# Patient Record
Sex: FEMALE | Race: WHITE | NOT HISPANIC OR LATINO | Employment: FULL TIME | URBAN - METROPOLITAN AREA
[De-identification: names, ages, dates, MRNs, and addresses within clinical notes are randomized per-mention and may not be internally consistent; named-entity substitution may affect disease eponyms.]

---

## 2017-02-28 DIAGNOSIS — I10 ESSENTIAL HYPERTENSION: Primary | ICD-10-CM

## 2017-02-28 DIAGNOSIS — E78.5 HYPERLIPIDEMIA, UNSPECIFIED HYPERLIPIDEMIA TYPE: ICD-10-CM

## 2017-03-19 LAB
ALBUMIN SERPL-MCNC: 4.8 G/DL (ref 3.5–5.5)
ALBUMIN/GLOB SERPL: 1.8 {RATIO} (ref 1.2–2.2)
ALP SERPL-CCNC: 60 IU/L (ref 39–117)
ALT SERPL-CCNC: 16 IU/L (ref 0–32)
AST SERPL-CCNC: 20 IU/L (ref 0–40)
BASOPHILS # BLD AUTO: 0.1 X10E3/UL (ref 0–0.2)
BASOPHILS NFR BLD AUTO: 2 %
BILIRUB SERPL-MCNC: 0.5 MG/DL (ref 0–1.2)
BUN SERPL-MCNC: 26 MG/DL (ref 6–24)
BUN/CREAT SERPL: 28 (ref 9–23)
CALCIUM SERPL-MCNC: 10.6 MG/DL (ref 8.7–10.2)
CHLORIDE SERPL-SCNC: 104 MMOL/L (ref 96–106)
CHOLEST SERPL-MCNC: 204 MG/DL (ref 100–199)
CO2 SERPL-SCNC: 24 MMOL/L (ref 18–29)
CREAT SERPL-MCNC: 0.93 MG/DL (ref 0.57–1)
EOSINOPHIL # BLD AUTO: 0.2 X10E3/UL (ref 0–0.4)
EOSINOPHIL NFR BLD AUTO: 5 %
ERYTHROCYTE [DISTWIDTH] IN BLOOD BY AUTOMATED COUNT: 12.9 % (ref 12.3–15.4)
GLOBULIN SER CALC-MCNC: 2.6 G/DL (ref 1.5–4.5)
GLUCOSE SERPL-MCNC: 94 MG/DL (ref 65–99)
HCT VFR BLD AUTO: 41 % (ref 34–46.6)
HDLC SERPL-MCNC: 58 MG/DL
HGB BLD-MCNC: 13.8 G/DL (ref 11.1–15.9)
IMM GRANULOCYTES # BLD: 0 X10E3/UL (ref 0–0.1)
IMM GRANULOCYTES NFR BLD: 0 %
LDLC SERPL CALC-MCNC: 105 MG/DL (ref 0–99)
LYMPHOCYTES # BLD AUTO: 1.6 X10E3/UL (ref 0.7–3.1)
LYMPHOCYTES NFR BLD AUTO: 32 %
MCH RBC QN AUTO: 32 PG (ref 26.6–33)
MCHC RBC AUTO-ENTMCNC: 33.7 G/DL (ref 31.5–35.7)
MCV RBC AUTO: 95 FL (ref 79–97)
MONOCYTES # BLD AUTO: 0.4 X10E3/UL (ref 0.1–0.9)
MONOCYTES NFR BLD AUTO: 9 %
NEUTROPHILS # BLD AUTO: 2.6 X10E3/UL (ref 1.4–7)
NEUTROPHILS NFR BLD AUTO: 52 %
PLATELET # BLD AUTO: 198 X10E3/UL (ref 150–379)
POTASSIUM SERPL-SCNC: 4.4 MMOL/L (ref 3.5–5.2)
PROT SERPL-MCNC: 7.4 G/DL (ref 6–8.5)
RBC # BLD AUTO: 4.31 X10E6/UL (ref 3.77–5.28)
SODIUM SERPL-SCNC: 143 MMOL/L (ref 134–144)
TRIGL SERPL-MCNC: 204 MG/DL (ref 0–149)
TSH SERPL DL<=0.005 MIU/L-ACNC: 1.47 UIU/ML (ref 0.45–4.5)
VLDLC SERPL CALC-MCNC: 41 MG/DL (ref 5–40)
WBC # BLD AUTO: 4.9 X10E3/UL (ref 3.4–10.8)

## 2017-03-27 ENCOUNTER — OFFICE VISIT (OUTPATIENT)
Dept: FAMILY MEDICINE CLINIC | Facility: CLINIC | Age: 62
End: 2017-03-27

## 2017-03-27 VITALS
RESPIRATION RATE: 16 BRPM | SYSTOLIC BLOOD PRESSURE: 140 MMHG | DIASTOLIC BLOOD PRESSURE: 80 MMHG | OXYGEN SATURATION: 98 % | HEART RATE: 59 BPM | TEMPERATURE: 98.4 F | HEIGHT: 64 IN | BODY MASS INDEX: 25.95 KG/M2 | WEIGHT: 152 LBS

## 2017-03-27 DIAGNOSIS — I10 ESSENTIAL HYPERTENSION: Primary | ICD-10-CM

## 2017-03-27 DIAGNOSIS — E78.2 MIXED HYPERLIPIDEMIA: ICD-10-CM

## 2017-03-27 PROCEDURE — 99214 OFFICE O/P EST MOD 30 MIN: CPT | Performed by: FAMILY MEDICINE

## 2017-03-27 RX ORDER — GRAPE SEED EXT/BIOFLAV,CITRUS 50MG-250MG
CAPSULE ORAL
COMMUNITY
End: 2019-01-17

## 2017-03-27 RX ORDER — LISINOPRIL AND HYDROCHLOROTHIAZIDE 12.5; 1 MG/1; MG/1
1 TABLET ORAL DAILY
Qty: 90 TABLET | Refills: 1 | Status: SHIPPED | OUTPATIENT
Start: 2017-03-27 | End: 2017-10-23 | Stop reason: SDUPTHER

## 2017-03-27 RX ORDER — FENOFIBRATE 160 MG/1
160 TABLET ORAL DAILY
Qty: 90 TABLET | Refills: 1 | Status: SHIPPED | OUTPATIENT
Start: 2017-03-27 | End: 2017-09-18 | Stop reason: SDUPTHER

## 2017-03-27 RX ORDER — BUDESONIDE AND FORMOTEROL FUMARATE DIHYDRATE 160; 4.5 UG/1; UG/1
AEROSOL RESPIRATORY (INHALATION)
Refills: 4 | COMMUNITY
Start: 2017-03-09 | End: 2017-07-24

## 2017-03-27 RX ORDER — AMLODIPINE BESYLATE 2.5 MG/1
2.5 TABLET ORAL DAILY
Qty: 90 TABLET | Refills: 1 | Status: SHIPPED | OUTPATIENT
Start: 2017-03-27 | End: 2017-10-23 | Stop reason: SDUPTHER

## 2017-03-27 RX ORDER — AZELASTINE 1 MG/ML
2 SPRAY, METERED NASAL 2 TIMES DAILY
COMMUNITY

## 2017-03-27 NOTE — PATIENT INSTRUCTIONS
Exercise 30 minutes most days of the week  Sleep 6-8 hours each night if possible  Low fat, low cholesterol diet   we discussed prescribed medications and how to take them   make sure you get results of any labs/studies ordered today  Low glycemic index diet  Exercise 3 times per week

## 2017-03-27 NOTE — PROGRESS NOTES
"Subjective   Sera Salas is a 61 y.o. female.     History of Present Illness   Chief Complaint:   Chief Complaint   Patient presents with   • Hypertension   • Hyperlipidemia       Sera Salas 61 y.o. female who presents today for Medical Management of the below listed issues and medication refills. I reviewed her lab results. Only took norvasc 1 month. Will add that back in medication.  she has a history of   Patient Active Problem List   Diagnosis   • Essential hypertension   • Hyperlipidemia   .  Since the last visit, she has overall felt well.  she has been compliant with   Current Outpatient Prescriptions:   •  Albuterol Sulfate (VENTOLIN HFA IN), Inhale., Disp: , Rfl:   •  amLODIPine (NORVASC) 2.5 MG tablet, TAKE 1 TABLET BY MOUTH DAILY, Disp: 90 tablet, Rfl: 1  •  aspirin 81 MG EC tablet, Take 81 mg by mouth daily., Disp: , Rfl:   •  Budesonide-Formoterol Fumarate (SYMBICORT IN), Inhale., Disp: , Rfl:   •  cetirizine (ZyrTEC) 10 MG tablet, Take 10 mg by mouth daily., Disp: , Rfl:   •  fenofibrate 160 MG tablet, Take 1 tablet by mouth daily., Disp: 90 tablet, Rfl: 1  •  fluticasone (FLONASE) 50 MCG/ACT nasal spray, 2 sprays into each nostril daily. Administer 2 sprays in each nostril for each dose., Disp: , Rfl:   •  lisinopril-hydrochlorothiazide (PRINZIDE,ZESTORETIC) 10-12.5 MG per tablet, Take 1 tablet by mouth daily., Disp: 90 tablet, Rfl: 1  •  LOPREEZA 1-0.5 MG per tablet, TK 1 T PO D, Disp: , Rfl: 3  •  montelukast (SINGULAIR) 10 MG tablet, Take 10 mg by mouth every night., Disp: , Rfl:   •  SYMBICORT 160-4.5 MCG/ACT inhaler, INHALE 2 PUFFS PO Q 12 HOURS. USE WITH SPACER AND RINSE MOUTH AFTER EACH USE, Disp: , Rfl: 4.  she denies medication side effects.    All of the chronic condition(s) listed above are stable w/o issues.    /79  Pulse 59  Temp 98.4 °F (36.9 °C) (Oral)   Resp 16  Ht 64\" (162.6 cm)  Wt 152 lb (68.9 kg)  SpO2 98%  BMI 26.09 kg/m2    Results for orders placed or " performed in visit on 02/28/17   Comprehensive metabolic panel   Result Value Ref Range    Glucose 94 65 - 99 mg/dL    BUN 26 (H) 6 - 24 mg/dL    Creatinine 0.93 0.57 - 1.00 mg/dL    eGFR Non African Am 67 >59 mL/min/1.73    eGFR African Am 78 >59 mL/min/1.73    BUN/Creatinine Ratio 28 (H) 9 - 23    Sodium 143 134 - 144 mmol/L    Potassium 4.4 3.5 - 5.2 mmol/L    Chloride 104 96 - 106 mmol/L    Total CO2 24 18 - 29 mmol/L    Calcium 10.6 (H) 8.7 - 10.2 mg/dL    Total Protein 7.4 6.0 - 8.5 g/dL    Albumin 4.8 3.5 - 5.5 g/dL    Globulin 2.6 1.5 - 4.5 g/dL    A/G Ratio 1.8 1.2 - 2.2    Total Bilirubin 0.5 0.0 - 1.2 mg/dL    Alkaline Phosphatase 60 39 - 117 IU/L    AST (SGOT) 20 0 - 40 IU/L    ALT (SGPT) 16 0 - 32 IU/L   Lipid panel   Result Value Ref Range    Total Cholesterol 204 (H) 100 - 199 mg/dL    Triglycerides 204 (H) 0 - 149 mg/dL    HDL Cholesterol 58 >39 mg/dL    VLDL Cholesterol 41 (H) 5 - 40 mg/dL    LDL Cholesterol  105 (H) 0 - 99 mg/dL   TSH   Result Value Ref Range    TSH 1.470 0.450 - 4.500 uIU/mL   CBC and Differential   Result Value Ref Range    WBC 4.9 3.4 - 10.8 x10E3/uL    RBC 4.31 3.77 - 5.28 x10E6/uL    Hemoglobin 13.8 11.1 - 15.9 g/dL    Hematocrit 41.0 34.0 - 46.6 %    MCV 95 79 - 97 fL    MCH 32.0 26.6 - 33.0 pg    MCHC 33.7 31.5 - 35.7 g/dL    RDW 12.9 12.3 - 15.4 %    Platelets 198 150 - 379 x10E3/uL    Neutrophil Rel % 52 %    Lymphocyte Rel % 32 %    Monocyte Rel % 9 %    Eosinophil Rel % 5 %    Basophil Rel % 2 %    Neutrophils Absolute 2.6 1.4 - 7.0 x10E3/uL    Lymphocytes Absolute 1.6 0.7 - 3.1 x10E3/uL    Monocytes Absolute 0.4 0.1 - 0.9 x10E3/uL    Eosinophils Absolute 0.2 0.0 - 0.4 x10E3/uL    Basophils Absolute 0.1 0.0 - 0.2 x10E3/uL    Immature Granulocyte Rel % 0 %    Immature Grans Absolute 0.0 0.0 - 0.1 x10E3/uL         The following portions of the patient's history were reviewed and updated as appropriate: allergies, current medications, past family history, past medical  history, past social history, past surgical history and problem list.    Review of Systems   Constitutional: Negative for activity change, appetite change and unexpected weight change.   Eyes: Negative for visual disturbance.   Respiratory: Negative for chest tightness and shortness of breath.    Cardiovascular: Negative for chest pain and palpitations.   Skin: Negative for color change.   Neurological: Negative for syncope and speech difficulty.   Psychiatric/Behavioral: Negative for confusion and decreased concentration.       Objective   Physical Exam   Constitutional: She is oriented to person, place, and time. She appears well-developed and well-nourished.   HENT:   Head: Normocephalic and atraumatic.   Eyes: EOM are normal. Pupils are equal, round, and reactive to light.   Neck: Normal range of motion. Neck supple. No thyromegaly present.   Cardiovascular: Normal rate and regular rhythm.    Pulmonary/Chest: Effort normal and breath sounds normal.   Abdominal: Soft. Bowel sounds are normal.   Musculoskeletal: Normal range of motion.   Neurological: She is alert and oriented to person, place, and time.   Skin: Skin is warm and dry.   Psychiatric: She has a normal mood and affect. Her behavior is normal.   Nursing note and vitals reviewed.      Assessment/Plan   Sera was seen today for hypertension and hyperlipidemia.    Diagnoses and all orders for this visit:    Essential hypertension  -     amLODIPine (NORVASC) 2.5 MG tablet; Take 1 tablet by mouth Daily.  -     lisinopril-hydrochlorothiazide (PRINZIDE,ZESTORETIC) 10-12.5 MG per tablet; Take 1 tablet by mouth Daily.  -     Comprehensive Metabolic Panel; Future  -     Lipid Panel; Future    Mixed hyperlipidemia  -     fenofibrate 160 MG tablet; Take 1 tablet by mouth Daily.  -     Comprehensive Metabolic Panel; Future  -     Lipid Panel; Future

## 2017-07-24 ENCOUNTER — OFFICE VISIT (OUTPATIENT)
Dept: FAMILY MEDICINE CLINIC | Facility: CLINIC | Age: 62
End: 2017-07-24

## 2017-07-24 VITALS
OXYGEN SATURATION: 100 % | RESPIRATION RATE: 16 BRPM | HEART RATE: 67 BPM | DIASTOLIC BLOOD PRESSURE: 65 MMHG | BODY MASS INDEX: 25.61 KG/M2 | HEIGHT: 64 IN | WEIGHT: 150 LBS | SYSTOLIC BLOOD PRESSURE: 117 MMHG | TEMPERATURE: 97.5 F

## 2017-07-24 DIAGNOSIS — N39.0 RECURRENT UTI (URINARY TRACT INFECTION): Primary | ICD-10-CM

## 2017-07-24 LAB
BILIRUB BLD-MCNC: NEGATIVE MG/DL
CLARITY, POC: CLEAR
COLOR UR: YELLOW
GLUCOSE UR STRIP-MCNC: NEGATIVE MG/DL
KETONES UR QL: NEGATIVE
LEUKOCYTE EST, POC: NEGATIVE
NITRITE UR-MCNC: NEGATIVE MG/ML
PH UR: 6 [PH] (ref 5–8)
PROT UR STRIP-MCNC: NEGATIVE MG/DL
RBC # UR STRIP: ABNORMAL /UL
SP GR UR: 1.02 (ref 1–1.03)
UROBILINOGEN UR QL: NORMAL

## 2017-07-24 PROCEDURE — 81003 URINALYSIS AUTO W/O SCOPE: CPT | Performed by: FAMILY MEDICINE

## 2017-07-24 PROCEDURE — 99213 OFFICE O/P EST LOW 20 MIN: CPT | Performed by: FAMILY MEDICINE

## 2017-07-24 RX ORDER — ALBUTEROL SULFATE 90 UG/1
2 AEROSOL, METERED RESPIRATORY (INHALATION) EVERY 4 HOURS PRN
COMMUNITY

## 2017-07-24 NOTE — PROGRESS NOTES
Subjective   Sera Salas is a 61 y.o. female.     History of Present Illness   Chief Complaint:   Chief Complaint   Patient presents with   • Urinary Tract Infection     Recurring UTI       Sera Salas 61 y.o. female who presents today for recurrent UTI. She stated that she has had 3 UTI's in the last 3 months. She has no pain currently but continues to have urinary frequency. Her UA dip in the office showed a trace of blood. I will send this out for a Micro and C&S. I am referring her to Dr. Richards at [facility] for further evaluation and treatment.  she has a history of   Patient Active Problem List   Diagnosis   • Essential hypertension   • Hyperlipidemia   .  Since the last visit, she has overall felt well.  she has been compliant with   Current Outpatient Prescriptions:   •  albuterol (PROVENTIL HFA;VENTOLIN HFA) 108 (90 BASE) MCG/ACT inhaler, Inhale 2 puffs Every 4 (Four) Hours As Needed for Wheezing., Disp: , Rfl:   •  amLODIPine (NORVASC) 2.5 MG tablet, Take 1 tablet by mouth Daily., Disp: 90 tablet, Rfl: 1  •  aspirin 81 MG EC tablet, Take 81 mg by mouth daily., Disp: , Rfl:   •  azelastine (ASTELIN) 0.1 % nasal spray, 2 sprays into each nostril 2 (Two) Times a Day. Use in each nostril as directed, Disp: , Rfl:   •  Black Cohosh 540 MG capsule, Take  by mouth., Disp: , Rfl:   •  cetirizine (ZyrTEC) 10 MG tablet, Take 10 mg by mouth daily., Disp: , Rfl:   •  Cholecalciferol (VITAMIN D3) 1000 UNITS capsule, Take  by mouth., Disp: , Rfl:   •  CRANBERRY FRUIT PO, Take  by mouth., Disp: , Rfl:   •  fenofibrate 160 MG tablet, Take 1 tablet by mouth Daily., Disp: 90 tablet, Rfl: 1  •  fluticasone (FLONASE) 50 MCG/ACT nasal spray, 2 sprays into each nostril daily. Administer 2 sprays in each nostril for each dose., Disp: , Rfl:   •  Krill Oil 300 MG capsule, Take  by mouth., Disp: , Rfl:   •  lisinopril-hydrochlorothiazide (PRINZIDE,ZESTORETIC) 10-12.5 MG per tablet, Take 1 tablet by mouth Daily., Disp: 90  "tablet, Rfl: 1  •  LOPREEZA 1-0.5 MG per tablet, TK 1 T PO D, Disp: , Rfl: 3  •  montelukast (SINGULAIR) 10 MG tablet, Take 10 mg by mouth every night., Disp: , Rfl:   •  Multiple Vitamins-Minerals (MULTI COMPLETE PO), Take  by mouth., Disp: , Rfl:   •  fluticasone-salmeterol (ADVAIR HFA) 115-21 MCG/ACT inhaler, Inhale 2 puffs 2 (Two) Times a Day., Disp: , Rfl: .  she denies medication side effects.    All of the chronic condition(s) listed above are stable w/o issues.    /65  Pulse 67  Temp 97.5 °F (36.4 °C) (Oral)   Resp 16  Ht 64\" (162.6 cm)  Wt 150 lb (68 kg)  SpO2 100%  BMI 25.75 kg/m2    The following portions of the patient's history were reviewed and updated as appropriate: allergies, current medications, past family history, past medical history, past social history, past surgical history and problem list.    Review of Systems   Constitutional: Negative for activity change, appetite change and unexpected weight change.   Eyes: Negative for visual disturbance.   Respiratory: Negative for chest tightness and shortness of breath.    Cardiovascular: Negative for chest pain and palpitations.   Genitourinary: Positive for frequency.   Skin: Negative for color change.   Neurological: Negative for syncope and speech difficulty.   Psychiatric/Behavioral: Negative for confusion and decreased concentration.       Objective   Physical Exam   Constitutional: She is oriented to person, place, and time. She appears well-developed and well-nourished.   Eyes: EOM are normal. Pupils are equal, round, and reactive to light.   Neck: Normal range of motion. Neck supple.   Cardiovascular: Normal rate and regular rhythm.    Pulmonary/Chest: Effort normal.   Abdominal: Soft.   Neurological: She is alert and oriented to person, place, and time.   Skin: Skin is warm and dry.       Assessment/Plan   Sera was seen today for urinary tract infection.    Diagnoses and all orders for this visit:    Recurrent UTI (urinary " tract infection)  -     POC Urinalysis Dipstick, Automated  -     Ambulatory Referral to Urology  -     Urine Culture  -     Urinalysis With Microscopic    Other orders  -     Cancel: Ambulatory Referral to Urology

## 2017-07-24 NOTE — PATIENT INSTRUCTIONS
Exercise 30 minutes most days of the week  Sleep 6-8 hours each night if possible  Low fat, low cholesterol diet   we discussed prescribed medications and how to take them   make sure you get results of any labs/studies ordered today  Low glycemic index diet     Refer to dr mccullough

## 2017-07-28 LAB
APPEARANCE UR: CLEAR
BACTERIA #/AREA URNS HPF: ABNORMAL /[HPF]
BACTERIA UR CULT: ABNORMAL
BACTERIA UR CULT: ABNORMAL
BILIRUB UR QL STRIP: NEGATIVE
COLOR UR: YELLOW
CRYSTALS URNS MICRO: ABNORMAL
EPI CELLS #/AREA URNS HPF: ABNORMAL /HPF
GLUCOSE UR QL: NEGATIVE
HGB UR QL STRIP: NEGATIVE
KETONES UR QL STRIP: NEGATIVE
LEUKOCYTE ESTERASE UR QL STRIP: NEGATIVE
Lab: NORMAL
MICRO URNS: (no result)
MICRO URNS: (no result)
MUCOUS THREADS URNS QL MICRO: PRESENT
NITRITE UR QL STRIP: NEGATIVE
OTHER ANTIBIOTIC SUSC ISLT: ABNORMAL
PH UR STRIP: 6.5 [PH] (ref 5–7.5)
PROT UR QL STRIP: NEGATIVE
RBC #/AREA URNS HPF: ABNORMAL /HPF
SP GR UR: 1.02 (ref 1–1.03)
UNIDENT CRYS URNS QL MICRO: PRESENT
UROBILINOGEN UR STRIP-MCNC: 0.2 MG/DL (ref 0.2–1)
WBC #/AREA URNS HPF: ABNORMAL /HPF

## 2017-08-17 ENCOUNTER — TELEPHONE (OUTPATIENT)
Dept: FAMILY MEDICINE CLINIC | Facility: CLINIC | Age: 62
End: 2017-08-17

## 2017-08-17 DIAGNOSIS — N39.0 URINARY TRACT INFECTION, SITE UNSPECIFIED: Primary | ICD-10-CM

## 2017-08-17 RX ORDER — CIPROFLOXACIN 500 MG/1
500 TABLET, FILM COATED ORAL 2 TIMES DAILY
Qty: 20 TABLET | Refills: 0 | Status: SHIPPED | OUTPATIENT
Start: 2017-08-17 | End: 2017-10-23

## 2017-08-17 NOTE — TELEPHONE ENCOUNTER
SEND CIPRO 500MG BID 10 DAYS TO [facility],    THEN SEND ORDER TO [facility]  FOR U/A WITH MICRO  AND C&S AFTER SHE FINISH CIPRO     I sent rx and lab order

## 2017-08-27 ENCOUNTER — RESULTS ENCOUNTER (OUTPATIENT)
Dept: FAMILY MEDICINE CLINIC | Facility: CLINIC | Age: 62
End: 2017-08-27

## 2017-08-27 DIAGNOSIS — E78.2 MIXED HYPERLIPIDEMIA: ICD-10-CM

## 2017-08-27 DIAGNOSIS — I10 ESSENTIAL HYPERTENSION: ICD-10-CM

## 2017-09-04 LAB
APPEARANCE UR: CLEAR
BACTERIA #/AREA URNS HPF: NORMAL /[HPF]
BACTERIA UR CULT: NORMAL
BACTERIA UR CULT: NORMAL
BILIRUB UR QL STRIP: NEGATIVE
COLOR UR: YELLOW
EPI CELLS #/AREA URNS HPF: NORMAL /HPF
GLUCOSE UR QL: NEGATIVE
HGB UR QL STRIP: NEGATIVE
KETONES UR QL STRIP: NEGATIVE
LEUKOCYTE ESTERASE UR QL STRIP: NEGATIVE
MICRO URNS: (no result)
MICRO URNS: (no result)
MUCOUS THREADS URNS QL MICRO: PRESENT
NITRITE UR QL STRIP: NEGATIVE
PH UR STRIP: 5.5 [PH] (ref 5–7.5)
PROT UR QL STRIP: NEGATIVE
RBC #/AREA URNS HPF: NORMAL /HPF
SP GR UR: 1.02 (ref 1–1.03)
UROBILINOGEN UR STRIP-MCNC: 0.2 MG/DL (ref 0.2–1)
WBC #/AREA URNS HPF: NORMAL /HPF

## 2017-09-15 DIAGNOSIS — I10 ESSENTIAL HYPERTENSION: ICD-10-CM

## 2017-09-15 RX ORDER — AMLODIPINE BESYLATE 2.5 MG/1
TABLET ORAL
Qty: 90 TABLET | Refills: 0 | OUTPATIENT
Start: 2017-09-15

## 2017-09-18 DIAGNOSIS — E78.2 MIXED HYPERLIPIDEMIA: ICD-10-CM

## 2017-09-18 RX ORDER — FENOFIBRATE 160 MG/1
TABLET ORAL
Qty: 30 TABLET | Refills: 0 | Status: SHIPPED | OUTPATIENT
Start: 2017-09-18 | End: 2017-10-23 | Stop reason: SDUPTHER

## 2017-10-08 LAB
ALBUMIN SERPL-MCNC: 4.8 G/DL (ref 3.6–4.8)
ALBUMIN/GLOB SERPL: 1.8 {RATIO} (ref 1.2–2.2)
ALP SERPL-CCNC: 72 IU/L (ref 39–117)
ALT SERPL-CCNC: 15 IU/L (ref 0–32)
AST SERPL-CCNC: 16 IU/L (ref 0–40)
BILIRUB SERPL-MCNC: 0.5 MG/DL (ref 0–1.2)
BUN SERPL-MCNC: 21 MG/DL (ref 8–27)
BUN/CREAT SERPL: 24 (ref 12–28)
CALCIUM SERPL-MCNC: 10.8 MG/DL (ref 8.7–10.3)
CHLORIDE SERPL-SCNC: 103 MMOL/L (ref 96–106)
CHOLEST SERPL-MCNC: 200 MG/DL (ref 100–199)
CO2 SERPL-SCNC: 24 MMOL/L (ref 18–29)
CREAT SERPL-MCNC: 0.89 MG/DL (ref 0.57–1)
GLOBULIN SER CALC-MCNC: 2.6 G/DL (ref 1.5–4.5)
GLUCOSE SERPL-MCNC: 89 MG/DL (ref 65–99)
HDLC SERPL-MCNC: 55 MG/DL
LDLC SERPL CALC-MCNC: 92 MG/DL (ref 0–99)
POTASSIUM SERPL-SCNC: 4.5 MMOL/L (ref 3.5–5.2)
PROT SERPL-MCNC: 7.4 G/DL (ref 6–8.5)
SODIUM SERPL-SCNC: 144 MMOL/L (ref 134–144)
TRIGL SERPL-MCNC: 266 MG/DL (ref 0–149)
VLDLC SERPL CALC-MCNC: 53 MG/DL (ref 5–40)

## 2017-10-23 ENCOUNTER — OFFICE VISIT (OUTPATIENT)
Dept: FAMILY MEDICINE CLINIC | Facility: CLINIC | Age: 62
End: 2017-10-23

## 2017-10-23 VITALS
SYSTOLIC BLOOD PRESSURE: 136 MMHG | WEIGHT: 149 LBS | HEIGHT: 64 IN | OXYGEN SATURATION: 97 % | DIASTOLIC BLOOD PRESSURE: 76 MMHG | TEMPERATURE: 98.2 F | BODY MASS INDEX: 25.44 KG/M2 | RESPIRATION RATE: 16 BRPM | HEART RATE: 59 BPM

## 2017-10-23 DIAGNOSIS — I10 ESSENTIAL HYPERTENSION: ICD-10-CM

## 2017-10-23 DIAGNOSIS — E83.52 HYPERCALCEMIA: Primary | ICD-10-CM

## 2017-10-23 DIAGNOSIS — E78.2 MIXED HYPERLIPIDEMIA: ICD-10-CM

## 2017-10-23 DIAGNOSIS — N39.0 RECURRENT UTI: ICD-10-CM

## 2017-10-23 PROCEDURE — 99214 OFFICE O/P EST MOD 30 MIN: CPT | Performed by: FAMILY MEDICINE

## 2017-10-23 RX ORDER — FENOFIBRATE 160 MG/1
160 TABLET ORAL DAILY
Qty: 90 TABLET | Refills: 1 | Status: SHIPPED | OUTPATIENT
Start: 2017-10-23 | End: 2020-10-05 | Stop reason: SDUPTHER

## 2017-10-23 RX ORDER — LISINOPRIL AND HYDROCHLOROTHIAZIDE 12.5; 1 MG/1; MG/1
1 TABLET ORAL DAILY
Qty: 90 TABLET | Refills: 1 | Status: SHIPPED | OUTPATIENT
Start: 2017-10-23 | End: 2020-10-05 | Stop reason: SDUPTHER

## 2017-10-23 RX ORDER — AMLODIPINE BESYLATE 2.5 MG/1
2.5 TABLET ORAL DAILY
Qty: 90 TABLET | Refills: 1 | Status: SHIPPED | OUTPATIENT
Start: 2017-10-23 | End: 2020-10-05 | Stop reason: SDUPTHER

## 2017-10-23 NOTE — PATIENT INSTRUCTIONS
Exercise 30 minutes most days of the week  Sleep 6-8 hours each night if possible  Low fat, low cholesterol diet   we discussed prescribed medications and how to take them   make sure you get results of any labs/studies ordered today  Low glycemic index diet     Will call labs

## 2017-10-23 NOTE — PROGRESS NOTES
Subjective   Sera Salas is a 62 y.o. female.     History of Present Illness   Chief Complaint:   Chief Complaint   Patient presents with   • Hypertension   • Hyperlipidemia       Sera Salas 62 y.o. female who presents today for Medical Management of the below listed issues and medication refills. I reviewed her lab results. U/a neg  Calcium high  Need pth. See labs  I reviewed  Urine c&s neg  No symptoms uti  she has a problem list of   Patient Active Problem List   Diagnosis   • Essential hypertension   • Hyperlipidemia   .  Since the last visit, she has overall felt well.  she has been compliant with   Current Outpatient Prescriptions:   •  albuterol (PROVENTIL HFA;VENTOLIN HFA) 108 (90 BASE) MCG/ACT inhaler, Inhale 2 puffs Every 4 (Four) Hours As Needed for Wheezing., Disp: , Rfl:   •  amLODIPine (NORVASC) 2.5 MG tablet, Take 1 tablet by mouth Daily., Disp: 90 tablet, Rfl: 1  •  aspirin 81 MG EC tablet, Take 81 mg by mouth daily., Disp: , Rfl:   •  azelastine (ASTELIN) 0.1 % nasal spray, 2 sprays into each nostril 2 (Two) Times a Day. Use in each nostril as directed, Disp: , Rfl:   •  Black Cohosh 540 MG capsule, Take  by mouth., Disp: , Rfl:   •  cetirizine (ZyrTEC) 10 MG tablet, Take 10 mg by mouth daily., Disp: , Rfl:   •  Cholecalciferol (VITAMIN D3) 1000 UNITS capsule, Take  by mouth., Disp: , Rfl:   •  CRANBERRY FRUIT PO, Take  by mouth., Disp: , Rfl:   •  fenofibrate 160 MG tablet, TAKE 1 TABLET BY MOUTH DAILY, Disp: 30 tablet, Rfl: 0  •  fluticasone (FLONASE) 50 MCG/ACT nasal spray, 2 sprays into each nostril daily. Administer 2 sprays in each nostril for each dose., Disp: , Rfl:   •  fluticasone-salmeterol (ADVAIR HFA) 115-21 MCG/ACT inhaler, Inhale 2 puffs 2 (Two) Times a Day., Disp: , Rfl:   •  Krill Oil 300 MG capsule, Take  by mouth., Disp: , Rfl:   •  lisinopril-hydrochlorothiazide (PRINZIDE,ZESTORETIC) 10-12.5 MG per tablet, Take 1 tablet by mouth Daily., Disp: 90 tablet, Rfl: 1  •   "LOPREEZA 1-0.5 MG per tablet, TK 1 T PO D, Disp: , Rfl: 3  •  montelukast (SINGULAIR) 10 MG tablet, Take 10 mg by mouth every night., Disp: , Rfl:   •  Multiple Vitamins-Minerals (MULTI COMPLETE PO), Take  by mouth., Disp: , Rfl: .  she denies medication side effects.    All of the chronic condition(s) listed above are stable w/o issues.    /76  Pulse 59  Temp 98.2 °F (36.8 °C) (Oral)   Resp 16  Ht 64\" (162.6 cm)  Wt 149 lb (67.6 kg)  SpO2 97%  BMI 25.58 kg/m2    Results for orders placed or performed in visit on 08/27/17   Comprehensive Metabolic Panel   Result Value Ref Range    Glucose 89 65 - 99 mg/dL    BUN 21 8 - 27 mg/dL    Creatinine 0.89 0.57 - 1.00 mg/dL    eGFR Non African Am 71 >59 mL/min/1.73    eGFR African Am 81 >59 mL/min/1.73    BUN/Creatinine Ratio 24 12 - 28    Sodium 144 134 - 144 mmol/L    Potassium 4.5 3.5 - 5.2 mmol/L    Chloride 103 96 - 106 mmol/L    Total CO2 24 18 - 29 mmol/L    Calcium 10.8 (H) 8.7 - 10.3 mg/dL    Total Protein 7.4 6.0 - 8.5 g/dL    Albumin 4.8 3.6 - 4.8 g/dL    Globulin 2.6 1.5 - 4.5 g/dL    A/G Ratio 1.8 1.2 - 2.2    Total Bilirubin 0.5 0.0 - 1.2 mg/dL    Alkaline Phosphatase 72 39 - 117 IU/L    AST (SGOT) 16 0 - 40 IU/L    ALT (SGPT) 15 0 - 32 IU/L   Lipid Panel   Result Value Ref Range    Total Cholesterol 200 (H) 100 - 199 mg/dL    Triglycerides 266 (H) 0 - 149 mg/dL    HDL Cholesterol 55 >39 mg/dL    VLDL Cholesterol 53 (H) 5 - 40 mg/dL    LDL Cholesterol  92 0 - 99 mg/dL         The following portions of the patient's history were reviewed and updated as appropriate: allergies, current medications, past family history, past medical history, past social history, past surgical history and problem list.    Review of Systems   Constitutional: Negative for activity change, appetite change and unexpected weight change.   Eyes: Negative for visual disturbance.   Respiratory: Negative for chest tightness and shortness of breath.    Cardiovascular: Negative for " chest pain and palpitations.   Skin: Negative for color change.   Neurological: Negative for syncope and speech difficulty.   Psychiatric/Behavioral: Negative for confusion and decreased concentration.       Objective   Physical Exam   Constitutional: She is oriented to person, place, and time. She appears well-developed and well-nourished.   HENT:   Right Ear: External ear normal.   Left Ear: External ear normal.   Mouth/Throat: Oropharynx is clear and moist.   Eyes: Pupils are equal, round, and reactive to light.   Neck: Neck supple. No thyromegaly present.   Cardiovascular: Normal rate and regular rhythm.    Pulmonary/Chest: Effort normal and breath sounds normal.   Abdominal: Soft. Bowel sounds are normal.   Lymphadenopathy:     She has no cervical adenopathy.   Neurological: She is alert and oriented to person, place, and time.   Psychiatric: She has a normal mood and affect. Her behavior is normal.   Nursing note and vitals reviewed.      Assessment/Plan   Sera was seen today for hypertension and hyperlipidemia.    Diagnoses and all orders for this visit:    Hypercalcemia  -     lisinopril-hydrochlorothiazide (PRINZIDE,ZESTORETIC) 10-12.5 MG per tablet; Take 1 tablet by mouth Daily.  -     fenofibrate 160 MG tablet; Take 1 tablet by mouth Daily.  -     amLODIPine (NORVASC) 2.5 MG tablet; Take 1 tablet by mouth Daily.  -     PTH, Intact & Calcium    Essential hypertension  -     lisinopril-hydrochlorothiazide (PRINZIDE,ZESTORETIC) 10-12.5 MG per tablet; Take 1 tablet by mouth Daily.  -     fenofibrate 160 MG tablet; Take 1 tablet by mouth Daily.  -     amLODIPine (NORVASC) 2.5 MG tablet; Take 1 tablet by mouth Daily.  -     PTH, Intact & Calcium    Mixed hyperlipidemia  -     lisinopril-hydrochlorothiazide (PRINZIDE,ZESTORETIC) 10-12.5 MG per tablet; Take 1 tablet by mouth Daily.  -     fenofibrate 160 MG tablet; Take 1 tablet by mouth Daily.  -     amLODIPine (NORVASC) 2.5 MG tablet; Take 1 tablet by mouth  Daily.  -     PTH, Intact & Calcium    Recurrent UTI  Comments:  follow up  Orders:  -     lisinopril-hydrochlorothiazide (PRINZIDE,ZESTORETIC) 10-12.5 MG per tablet; Take 1 tablet by mouth Daily.  -     fenofibrate 160 MG tablet; Take 1 tablet by mouth Daily.  -     amLODIPine (NORVASC) 2.5 MG tablet; Take 1 tablet by mouth Daily.  -     PTH, Intact & Calcium

## 2017-10-24 LAB
CALCIUM SERPL-MCNC: 11.2 MG/DL (ref 8.7–10.3)
INTACT PTH: ABNORMAL
PTH-INTACT SERPL-MCNC: 38 PG/ML (ref 15–65)

## 2018-02-12 ENCOUNTER — TELEPHONE (OUTPATIENT)
Dept: FAMILY MEDICINE CLINIC | Facility: CLINIC | Age: 63
End: 2018-02-12

## 2018-02-12 NOTE — TELEPHONE ENCOUNTER
----- Message from Olu Nuñez MD sent at 2/11/2018  1:06 PM EST -----  Regarding: uti and hypercalcemia  She needs appointment about recurrent uti and hypercalcemia

## 2018-03-26 DIAGNOSIS — I10 ESSENTIAL HYPERTENSION: ICD-10-CM

## 2018-03-26 DIAGNOSIS — E83.52 HYPERCALCEMIA: ICD-10-CM

## 2018-03-26 DIAGNOSIS — N39.0 RECURRENT UTI: ICD-10-CM

## 2018-03-26 DIAGNOSIS — E78.2 MIXED HYPERLIPIDEMIA: ICD-10-CM

## 2018-03-26 RX ORDER — AMLODIPINE BESYLATE 2.5 MG/1
TABLET ORAL
Qty: 90 TABLET | Refills: 0 | OUTPATIENT
Start: 2018-03-26

## 2018-07-07 DIAGNOSIS — I10 ESSENTIAL HYPERTENSION: ICD-10-CM

## 2018-07-07 DIAGNOSIS — E78.2 MIXED HYPERLIPIDEMIA: ICD-10-CM

## 2018-07-07 DIAGNOSIS — E83.52 HYPERCALCEMIA: ICD-10-CM

## 2018-07-07 DIAGNOSIS — N39.0 RECURRENT UTI: ICD-10-CM

## 2018-07-11 RX ORDER — LISINOPRIL AND HYDROCHLOROTHIAZIDE 12.5; 1 MG/1; MG/1
1 TABLET ORAL DAILY
Qty: 90 TABLET | Refills: 0 | OUTPATIENT
Start: 2018-07-11

## 2019-01-04 ENCOUNTER — TRANSCRIBE ORDERS (OUTPATIENT)
Dept: ADMINISTRATIVE | Facility: HOSPITAL | Age: 64
End: 2019-01-04

## 2019-01-04 DIAGNOSIS — R94.31 ABNORMAL EKG: Primary | ICD-10-CM

## 2019-01-17 ENCOUNTER — HOSPITAL ENCOUNTER (OUTPATIENT)
Dept: CARDIOLOGY | Facility: HOSPITAL | Age: 64
Discharge: HOME | End: 2019-01-17
Admitting: INTERNAL MEDICINE

## 2019-01-17 DIAGNOSIS — R94.31 ABNORMAL EKG: ICD-10-CM

## 2019-01-17 LAB
BH CV STRESS BP STAGE 1: NORMAL
BH CV STRESS BP STAGE 2: NORMAL
BH CV STRESS BP STAGE 3: NORMAL
BH CV STRESS DURATION MIN STAGE 1: 3
BH CV STRESS DURATION MIN STAGE 2: 3
BH CV STRESS DURATION MIN STAGE 3: 3
BH CV STRESS DURATION SEC STAGE 1: 0
BH CV STRESS DURATION SEC STAGE 2: 0
BH CV STRESS DURATION SEC STAGE 3: 0
BH CV STRESS GRADE STAGE 1: 10
BH CV STRESS GRADE STAGE 2: 12
BH CV STRESS GRADE STAGE 3: 14
BH CV STRESS HR STAGE 1: 102
BH CV STRESS HR STAGE 2: 120
BH CV STRESS HR STAGE 3: 144
BH CV STRESS METS STAGE 1: 5
BH CV STRESS METS STAGE 2: 7.5
BH CV STRESS METS STAGE 3: 10
BH CV STRESS PROTOCOL 1: NORMAL
BH CV STRESS RECOVERY BP: NORMAL MMHG
BH CV STRESS RECOVERY HR: 92 BPM
BH CV STRESS SPEED STAGE 1: 1.7
BH CV STRESS SPEED STAGE 2: 2.5
BH CV STRESS SPEED STAGE 3: 3.4
BH CV STRESS STAGE 1: 1
BH CV STRESS STAGE 2: 2
BH CV STRESS STAGE 3: 3
MAXIMAL PREDICTED HEART RATE: 159 BPM
PERCENT MAX PREDICTED HR: 91.19 %
STRESS BASELINE BP: NORMAL MMHG
STRESS BASELINE HR: 80 BPM
STRESS PERCENT HR: 107 %
STRESS POST ESTIMATED WORKLOAD: 10.3 METS
STRESS POST EXERCISE DUR MIN: 9 MIN
STRESS POST EXERCISE DUR SEC: 0 SEC
STRESS POST PEAK BP: NORMAL MMHG
STRESS POST PEAK HR: 145 BPM
STRESS TARGET HR: 135 BPM

## 2019-01-17 PROCEDURE — 93018 CV STRESS TEST I&R ONLY: CPT | Performed by: INTERNAL MEDICINE

## 2019-01-17 PROCEDURE — 93016 CV STRESS TEST SUPVJ ONLY: CPT | Performed by: INTERNAL MEDICINE

## 2019-01-17 PROCEDURE — 93017 CV STRESS TEST TRACING ONLY: CPT

## 2019-01-17 RX ORDER — LANOLIN ALCOHOL/MO/W.PET/CERES
1000 CREAM (GRAM) TOPICAL DAILY
COMMUNITY
End: 2021-07-22 | Stop reason: ALTCHOICE

## 2020-10-05 ENCOUNTER — OFFICE VISIT (OUTPATIENT)
Dept: FAMILY MEDICINE CLINIC | Facility: CLINIC | Age: 65
End: 2020-10-05

## 2020-10-05 VITALS
DIASTOLIC BLOOD PRESSURE: 70 MMHG | BODY MASS INDEX: 26.46 KG/M2 | WEIGHT: 155 LBS | RESPIRATION RATE: 16 BRPM | TEMPERATURE: 97.1 F | OXYGEN SATURATION: 96 % | SYSTOLIC BLOOD PRESSURE: 120 MMHG | HEIGHT: 64 IN | HEART RATE: 62 BPM

## 2020-10-05 DIAGNOSIS — J45.40 MODERATE PERSISTENT ASTHMA WITHOUT COMPLICATION: ICD-10-CM

## 2020-10-05 DIAGNOSIS — E78.2 MIXED HYPERLIPIDEMIA: ICD-10-CM

## 2020-10-05 DIAGNOSIS — I10 ESSENTIAL HYPERTENSION: Primary | ICD-10-CM

## 2020-10-05 PROCEDURE — 99203 OFFICE O/P NEW LOW 30 MIN: CPT | Performed by: NURSE PRACTITIONER

## 2020-10-05 RX ORDER — LISINOPRIL AND HYDROCHLOROTHIAZIDE 12.5; 1 MG/1; MG/1
1 TABLET ORAL DAILY
Qty: 90 TABLET | Refills: 1 | Status: SHIPPED | OUTPATIENT
Start: 2020-10-05 | End: 2021-06-07 | Stop reason: SDUPTHER

## 2020-10-05 RX ORDER — AMLODIPINE BESYLATE 2.5 MG/1
2.5 TABLET ORAL DAILY
Qty: 90 TABLET | Refills: 1 | Status: SHIPPED | OUTPATIENT
Start: 2020-10-05 | End: 2021-04-07

## 2020-10-05 RX ORDER — FENOFIBRATE 160 MG/1
160 TABLET ORAL DAILY
Qty: 90 TABLET | Refills: 1 | Status: SHIPPED | OUTPATIENT
Start: 2020-10-05 | End: 2021-04-07

## 2020-10-05 NOTE — PROGRESS NOTES
Subjective   Sera Salas is a 65 y.o. female.     History of Present Illness   Sera Salas 65 y.o. female who presents today for a new patient appointment.    she has a history of   Patient Active Problem List   Diagnosis   • Essential hypertension   • Hyperlipidemia   • Moderate persistent asthma without complication   .  she is here to establish care and is here to get their medications refilled I reviewed the PFSH recorded today by my MA/LPN staff.   she   She has been feeling well.      UTD on PAP and mammogram. Sees GYN at [facility].      She has hx of asthma that is well controlled on Daily Advair.  She rarely uses her albuterol inhaler. Sees [facility].     Had cardiac workup last year through [facility] for episode of shortness of breath.  Stress test and echo negative.   The following portions of the patient's history were reviewed and updated as appropriate: allergies, current medications, past family history, past medical history, past social history, past surgical history and problem list.    Review of Systems   Constitutional: Negative for unexpected weight change.   Respiratory: Negative for shortness of breath.    Cardiovascular: Negative for chest pain and palpitations.   Endocrine: Negative for cold intolerance, heat intolerance, polydipsia, polyphagia and polyuria.   Psychiatric/Behavioral: Negative for behavioral problems.       Objective   Physical Exam  Vitals signs and nursing note reviewed.   Constitutional:       Appearance: Normal appearance. She is well-developed.   Neck:      Vascular: No carotid bruit.   Cardiovascular:      Rate and Rhythm: Normal rate and regular rhythm.   Pulmonary:      Effort: Pulmonary effort is normal.      Breath sounds: Normal breath sounds.   Neurological:      Mental Status: She is alert and oriented to person, place, and time.   Psychiatric:         Mood and Affect: Mood normal.         Behavior: Behavior normal.         Thought Content: Thought  content normal.         Judgment: Judgment normal.         Assessment/Plan   Sera was seen today for establish care.    Diagnoses and all orders for this visit:    Essential hypertension  -     lisinopril-hydrochlorothiazide (PRINZIDE,ZESTORETIC) 10-12.5 MG per tablet; Take 1 tablet by mouth Daily.  -     fenofibrate 160 MG tablet; Take 1 tablet by mouth Daily.  -     amLODIPine (NORVASC) 2.5 MG tablet; Take 1 tablet by mouth Daily.  -     Comprehensive metabolic panel  -     Lipid panel  -     CBC and Differential  -     TSH    Mixed hyperlipidemia  -     lisinopril-hydrochlorothiazide (PRINZIDE,ZESTORETIC) 10-12.5 MG per tablet; Take 1 tablet by mouth Daily.  -     fenofibrate 160 MG tablet; Take 1 tablet by mouth Daily.  -     amLODIPine (NORVASC) 2.5 MG tablet; Take 1 tablet by mouth Daily.  -     Comprehensive metabolic panel  -     Lipid panel  -     CBC and Differential  -     TSH    Moderate persistent asthma without complication          Will get flu and pneumonia vaccine at work as she gets these for free.     Labs today as she is fasting.

## 2020-10-05 NOTE — PATIENT INSTRUCTIONS
Diastasis Recti    Diastasis recti is when the muscles of the abdomen (rectus abdominis muscles) become thin and separate. The result is a wider space between the right and left abdomen (abdominal) muscles. This wider space between the muscles may cause a bulge in the middle of your abdomen. You may notice this bulge when you are straining or when you sit up from a lying down position.  Diastasis recti can affect men and women. It is most common among pregnant women, infants, people who are obese, and people who have had abdominal surgery. Exercise or surgical treatment may help correct it.  What are the causes?  Common causes of this condition include:  · Pregnancy. The growing uterus puts pressure on the abdominal muscles, which causes the muscles to separate.  · Obesity. Excess fat puts pressure on abdominal muscles.  · Weightlifting.  · Some abdomen exercises.  · Advanced age.  · Genetics.  · Prior abdominal surgery.  What increases the risk?  This condition is more likely to develop in:  · Women.  · Newborns, especially newborns who are born early (prematurely).  What are the signs or symptoms?  Common symptoms of this condition include:  · A bulge in the middle of the abdomen. You will notice it most when you sit up or strain.  · Pain in the low back, pelvis, or hips.  · Constipation.  · Inability to control when you urinate (urinary incontinence).  · Bloating.  · Poor posture.  How is this diagnosed?  This condition is diagnosed with a physical exam. Your health care provider will ask you to lie flat on your back and do a crunch or half sit-up. If you have diastasis recti, a vertical bulge will appear between your abdominal muscles in the center of your abdomen. Your health care provider will measure the gap between your muscles with one of the following:  · A medical device used to measure the space between two objects (caliper).  · A tape measure.  · CT scan.  · Ultrasound.  · Finger spaces. Your health  care provider will measure the space using their fingers.  How is this treated?  If your muscle separation is not too large, you may not need treatment. However, if you are a woman who plans to become pregnant again, you should treat this condition before your next pregnancy. Treatment may include:  · Physical therapy to strengthen and tighten your abdominal muscles.  · Lifestyle changes such as weight loss and exercise.  · Over-the-counter pain medicines as needed.  · Surgery to correct the separation.  Follow these instructions at home:  Activity  · Return to your normal activities as told by your health care provider. Ask your health care provider what activities are safe for you.  · When lifting weights or doing exercises using your abdominal muscles or the muscles in the center of your body that give stability (core muscles), make sure you are doing your exercises and movements correctly. Proper form can help to prevent the condition from happening again.  General instructions  · If you are overweight, ask your health care provider for help with weight loss. Losing even a small amount of weight can help to improve your diastasis recti.  · Take over-the-counter or prescription medicines only as told by your health care provider.  · Do not strain. Straining can make the separation worse. Examples of straining include:  ? Pushing hard to have a bowel movement, such as due to constipation.  ? Lifting heavy objects, including children.  ? Standing up and sitting down.  · Take steps to prevent constipation:  ? Drink enough fluid to keep your urine clear or pale yellow.  ? Take over-the-counter or prescription medicines only as directed.  ? Eat foods that are high in fiber, such as fresh fruits and vegetables, whole grains, and beans.  ? Limit foods that are high in fat and processed sugars, such as fried and sweet foods.  Contact a health care provider if:  · You notice a new bulge in your abdomen.  Get help right  away if:  · You experience severe discomfort in your abdomen.  · You develop severe abdominal pain along with nausea, vomiting, or fever.  Summary  · Diastasis recti is when the abdomen (abdominal) muscles become thin and separate. Your abdomen will stick out because the space between your right and left abdomen muscles has widened.  · The most common symptom is a bulge in your abdomen. You will notice it most when you sit up or are straining.  · This condition is diagnosed during a physical exam.  · If the abdomen separation is not too big, you may choose not to have treatment. Otherwise, you may need to undergo physical therapy or surgery.  This information is not intended to replace advice given to you by your health care provider. Make sure you discuss any questions you have with your health care provider.  Document Released: 02/12/2018 Document Revised: 11/30/2018 Document Reviewed: 02/12/2018  Elsevier Patient Education © 2020 [employer]

## 2020-10-06 DIAGNOSIS — E83.52 HYPERCALCEMIA: Primary | ICD-10-CM

## 2020-10-06 LAB
ALBUMIN SERPL-MCNC: 5.3 G/DL (ref 3.5–5.2)
ALBUMIN/GLOB SERPL: 2.4 G/DL
ALP SERPL-CCNC: 100 U/L (ref 39–117)
ALT SERPL-CCNC: 37 U/L (ref 1–33)
AST SERPL-CCNC: 27 U/L (ref 1–32)
BASOPHILS # BLD AUTO: 0.07 10*3/MM3 (ref 0–0.2)
BASOPHILS NFR BLD AUTO: 1.1 % (ref 0–1.5)
BILIRUB SERPL-MCNC: 0.6 MG/DL (ref 0–1.2)
BUN SERPL-MCNC: 21 MG/DL (ref 8–23)
BUN/CREAT SERPL: 25.6 (ref 7–25)
CALCIUM SERPL-MCNC: 11.2 MG/DL (ref 8.6–10.5)
CHLORIDE SERPL-SCNC: 104 MMOL/L (ref 98–107)
CHOLEST SERPL-MCNC: 216 MG/DL (ref 0–200)
CO2 SERPL-SCNC: 30.1 MMOL/L (ref 22–29)
CREAT SERPL-MCNC: 0.82 MG/DL (ref 0.57–1)
EOSINOPHIL # BLD AUTO: 0.18 10*3/MM3 (ref 0–0.4)
EOSINOPHIL NFR BLD AUTO: 2.7 % (ref 0.3–6.2)
ERYTHROCYTE [DISTWIDTH] IN BLOOD BY AUTOMATED COUNT: 12.1 % (ref 12.3–15.4)
GLOBULIN SER CALC-MCNC: 2.2 GM/DL
GLUCOSE SERPL-MCNC: 87 MG/DL (ref 65–99)
HCT VFR BLD AUTO: 40.9 % (ref 34–46.6)
HDLC SERPL-MCNC: 58 MG/DL (ref 40–60)
HGB BLD-MCNC: 14.1 G/DL (ref 12–15.9)
IMM GRANULOCYTES # BLD AUTO: 0.01 10*3/MM3 (ref 0–0.05)
IMM GRANULOCYTES NFR BLD AUTO: 0.2 % (ref 0–0.5)
LDLC SERPL CALC-MCNC: 102 MG/DL (ref 0–100)
LYMPHOCYTES # BLD AUTO: 1.48 10*3/MM3 (ref 0.7–3.1)
LYMPHOCYTES NFR BLD AUTO: 22.3 % (ref 19.6–45.3)
MCH RBC QN AUTO: 31.8 PG (ref 26.6–33)
MCHC RBC AUTO-ENTMCNC: 34.5 G/DL (ref 31.5–35.7)
MCV RBC AUTO: 92.3 FL (ref 79–97)
MONOCYTES # BLD AUTO: 0.58 10*3/MM3 (ref 0.1–0.9)
MONOCYTES NFR BLD AUTO: 8.7 % (ref 5–12)
NEUTROPHILS # BLD AUTO: 4.31 10*3/MM3 (ref 1.7–7)
NEUTROPHILS NFR BLD AUTO: 65 % (ref 42.7–76)
NRBC BLD AUTO-RTO: 0 /100 WBC (ref 0–0.2)
PLATELET # BLD AUTO: 187 10*3/MM3 (ref 140–450)
POTASSIUM SERPL-SCNC: 4.1 MMOL/L (ref 3.5–5.2)
PROT SERPL-MCNC: 7.5 G/DL (ref 6–8.5)
RBC # BLD AUTO: 4.43 10*6/MM3 (ref 3.77–5.28)
SODIUM SERPL-SCNC: 144 MMOL/L (ref 136–145)
TRIGL SERPL-MCNC: 281 MG/DL (ref 0–150)
TSH SERPL DL<=0.005 MIU/L-ACNC: 0.92 UIU/ML (ref 0.27–4.2)
VLDLC SERPL CALC-MCNC: 56.2 MG/DL
WBC # BLD AUTO: 6.63 10*3/MM3 (ref 3.4–10.8)

## 2021-01-08 RX ORDER — HYDROCHLOROTHIAZIDE 12.5 MG/1
TABLET ORAL
Qty: 90 TABLET | OUTPATIENT
Start: 2021-01-08

## 2021-03-04 ENCOUNTER — OFFICE VISIT (OUTPATIENT)
Dept: FAMILY MEDICINE CLINIC | Facility: CLINIC | Age: 66
End: 2021-03-04

## 2021-03-04 VITALS
TEMPERATURE: 97.4 F | BODY MASS INDEX: 26.63 KG/M2 | WEIGHT: 156 LBS | DIASTOLIC BLOOD PRESSURE: 70 MMHG | OXYGEN SATURATION: 98 % | RESPIRATION RATE: 16 BRPM | HEART RATE: 77 BPM | SYSTOLIC BLOOD PRESSURE: 110 MMHG | HEIGHT: 64 IN

## 2021-03-04 DIAGNOSIS — G89.29 CHRONIC BILATERAL LOW BACK PAIN WITHOUT SCIATICA: ICD-10-CM

## 2021-03-04 DIAGNOSIS — S46.911A STRAIN OF RIGHT SHOULDER, INITIAL ENCOUNTER: Primary | ICD-10-CM

## 2021-03-04 DIAGNOSIS — M54.50 CHRONIC BILATERAL LOW BACK PAIN WITHOUT SCIATICA: ICD-10-CM

## 2021-03-04 PROCEDURE — 73030 X-RAY EXAM OF SHOULDER: CPT | Performed by: NURSE PRACTITIONER

## 2021-03-04 PROCEDURE — 99214 OFFICE O/P EST MOD 30 MIN: CPT | Performed by: NURSE PRACTITIONER

## 2021-03-04 RX ORDER — PREDNISONE 20 MG/1
20 TABLET ORAL 2 TIMES DAILY
Qty: 14 TABLET | Refills: 0 | Status: SHIPPED | OUTPATIENT
Start: 2021-03-04 | End: 2021-06-07 | Stop reason: SDUPTHER

## 2021-03-04 NOTE — PROGRESS NOTES
Subjective   Sera Salas is a 65 y.o. female.     History of Present Illness   Sera Salas 65 y.o. female presents for evaluation and treatment of  low back pain. The patient first noted symptoms 2 months ago. It was not related to recent heavy lifting.  The pain intensity is moderate , and is located lumbar. The pain is described as aching and tight (pulling) and occurs constantly. The symptoms are progressive. Symptoms are exacerbated by laying down. Factors which relieve the pain include NSAID's, heat, topical pain therapy, repositioning and keeping still. Other associated symptoms include right shoulder pain within the past couple of weeks. Reports difficulty lifting objects. Previous history of symptoms: never.         The following portions of the patient's history were reviewed and updated as appropriate: allergies, current medications, past family history, past medical history, past social history, past surgical history and problem list.    Review of Systems   Constitutional: Negative for unexpected weight change.   Respiratory: Negative for shortness of breath.    Cardiovascular: Negative for chest pain and palpitations.   Musculoskeletal: Positive for arthralgias. Negative for joint swelling.   Neurological: Positive for weakness. Negative for numbness.   Psychiatric/Behavioral: Negative for behavioral problems.       Objective   Physical Exam  Vitals signs and nursing note reviewed.   Constitutional:       Appearance: Normal appearance. She is well-developed.   Pulmonary:      Effort: Pulmonary effort is normal.   Musculoskeletal:      Right shoulder: She exhibits tenderness and pain. She exhibits normal range of motion, no bony tenderness, no swelling, no effusion, no crepitus, no deformity, no laceration, no spasm and normal pulse.      Lumbar back: She exhibits pain. She exhibits normal range of motion, no tenderness, no bony tenderness, no swelling, no edema, no deformity, no laceration, no  spasm and normal pulse.   Neurological:      Mental Status: She is alert and oriented to person, place, and time.   Psychiatric:         Mood and Affect: Mood normal.         Behavior: Behavior normal.         Thought Content: Thought content normal.         Judgment: Judgment normal.     2 view xray of right shoulder ordered and reviewed by me. No fracture, dislocation or significant joint space narrowing noted. No comparison on file.     Assessment/Plan   Diagnoses and all orders for this visit:    1. Strain of right shoulder, initial encounter (Primary)  -     Ambulatory Referral to Physical Therapy Evaluate and treat  -     predniSONE (DELTASONE) 20 MG tablet; Take 1 tablet by mouth 2 (Two) Times a Day.  Dispense: 14 tablet; Refill: 0  -     Cancel: XR Shoulder 2+ View Left (In Office)  -     XR Shoulder 2+ View Right (In Office)    2. Chronic bilateral low back pain without sciatica  -     Ambulatory Referral to Physical Therapy Evaluate and treat  -     predniSONE (DELTASONE) 20 MG tablet; Take 1 tablet by mouth 2 (Two) Times a Day.  Dispense: 14 tablet; Refill: 0  -     XR Shoulder 2+ View Right (In Office)

## 2021-03-19 ENCOUNTER — BULK ORDERING (OUTPATIENT)
Dept: CASE MANAGEMENT | Facility: OTHER | Age: 66
End: 2021-03-19

## 2021-03-19 DIAGNOSIS — Z23 IMMUNIZATION DUE: ICD-10-CM

## 2021-04-07 DIAGNOSIS — E78.2 MIXED HYPERLIPIDEMIA: ICD-10-CM

## 2021-04-07 DIAGNOSIS — I10 ESSENTIAL HYPERTENSION: ICD-10-CM

## 2021-04-09 RX ORDER — AMLODIPINE BESYLATE 2.5 MG/1
2.5 TABLET ORAL DAILY
Qty: 30 TABLET | Refills: 0 | Status: SHIPPED | OUTPATIENT
Start: 2021-04-09 | End: 2021-06-07 | Stop reason: SDUPTHER

## 2021-04-09 RX ORDER — FENOFIBRATE 160 MG/1
160 TABLET ORAL DAILY
Qty: 30 TABLET | Refills: 0 | Status: SHIPPED | OUTPATIENT
Start: 2021-04-09 | End: 2021-06-07 | Stop reason: SDUPTHER

## 2021-04-09 RX ORDER — LISINOPRIL 10 MG/1
TABLET ORAL
Qty: 90 TABLET | OUTPATIENT
Start: 2021-04-09

## 2021-04-09 RX ORDER — LISINOPRIL 10 MG/1
TABLET ORAL
Qty: 30 TABLET | Refills: 0 | Status: SHIPPED | OUTPATIENT
Start: 2021-04-09 | End: 2021-06-07

## 2021-04-20 ENCOUNTER — TELEPHONE (OUTPATIENT)
Dept: FAMILY MEDICINE CLINIC | Facility: CLINIC | Age: 66
End: 2021-04-20

## 2021-04-20 DIAGNOSIS — I10 ESSENTIAL HYPERTENSION: ICD-10-CM

## 2021-04-20 DIAGNOSIS — E78.2 MIXED HYPERLIPIDEMIA: Primary | ICD-10-CM

## 2021-04-20 NOTE — TELEPHONE ENCOUNTER
Patient is coming in on 06/07/21 for a physical with Milena Stevenson and needing lab orders put in because she will be going to [facility] to get her labs done on a Saturday before. Advised patient to have lab work done a week or two prior to the appointment so you can go over the results within that appointment.

## 2021-06-02 LAB
25(OH)D3+25(OH)D2 SERPL-MCNC: 24.9 NG/ML (ref 30–100)
ALBUMIN SERPL-MCNC: 5.2 G/DL (ref 3.5–5.2)
ALBUMIN/GLOB SERPL: 2.5 G/DL
ALP SERPL-CCNC: 103 U/L (ref 39–117)
ALT SERPL-CCNC: 27 U/L (ref 1–33)
AST SERPL-CCNC: 22 U/L (ref 1–32)
BASOPHILS # BLD AUTO: 0.07 10*3/MM3 (ref 0–0.2)
BASOPHILS NFR BLD AUTO: 1.5 % (ref 0–1.5)
BILIRUB SERPL-MCNC: 0.6 MG/DL (ref 0–1.2)
BUN SERPL-MCNC: 23 MG/DL (ref 8–23)
BUN/CREAT SERPL: 25.6 (ref 7–25)
CALCIUM SERPL-MCNC: 11.1 MG/DL (ref 8.6–10.5)
CHLORIDE SERPL-SCNC: 107 MMOL/L (ref 98–107)
CHOLEST SERPL-MCNC: 212 MG/DL (ref 0–200)
CO2 SERPL-SCNC: 27.2 MMOL/L (ref 22–29)
CREAT SERPL-MCNC: 0.9 MG/DL (ref 0.57–1)
EOSINOPHIL # BLD AUTO: 0.23 10*3/MM3 (ref 0–0.4)
EOSINOPHIL NFR BLD AUTO: 5 % (ref 0.3–6.2)
ERYTHROCYTE [DISTWIDTH] IN BLOOD BY AUTOMATED COUNT: 12.8 % (ref 12.3–15.4)
GLOBULIN SER CALC-MCNC: 2.1 GM/DL
GLUCOSE SERPL-MCNC: 107 MG/DL (ref 65–99)
HCT VFR BLD AUTO: 43.6 % (ref 34–46.6)
HDLC SERPL-MCNC: 59 MG/DL (ref 40–60)
HGB BLD-MCNC: 14.6 G/DL (ref 12–15.9)
IMM GRANULOCYTES # BLD AUTO: 0.02 10*3/MM3 (ref 0–0.05)
IMM GRANULOCYTES NFR BLD AUTO: 0.4 % (ref 0–0.5)
LDLC SERPL CALC-MCNC: 128 MG/DL (ref 0–100)
LYMPHOCYTES # BLD AUTO: 1.77 10*3/MM3 (ref 0.7–3.1)
LYMPHOCYTES NFR BLD AUTO: 38.4 % (ref 19.6–45.3)
MCH RBC QN AUTO: 32.1 PG (ref 26.6–33)
MCHC RBC AUTO-ENTMCNC: 33.5 G/DL (ref 31.5–35.7)
MCV RBC AUTO: 95.8 FL (ref 79–97)
MONOCYTES # BLD AUTO: 0.47 10*3/MM3 (ref 0.1–0.9)
MONOCYTES NFR BLD AUTO: 10.2 % (ref 5–12)
NEUTROPHILS # BLD AUTO: 2.05 10*3/MM3 (ref 1.7–7)
NEUTROPHILS NFR BLD AUTO: 44.5 % (ref 42.7–76)
NRBC BLD AUTO-RTO: 0 /100 WBC (ref 0–0.2)
PLATELET # BLD AUTO: 193 10*3/MM3 (ref 140–450)
POTASSIUM SERPL-SCNC: 4.3 MMOL/L (ref 3.5–5.2)
PROT SERPL-MCNC: 7.3 G/DL (ref 6–8.5)
RBC # BLD AUTO: 4.55 10*6/MM3 (ref 3.77–5.28)
SODIUM SERPL-SCNC: 143 MMOL/L (ref 136–145)
TRIGL SERPL-MCNC: 144 MG/DL (ref 0–150)
TSH SERPL DL<=0.005 MIU/L-ACNC: 1.21 UIU/ML (ref 0.27–4.2)
VLDLC SERPL CALC-MCNC: 25 MG/DL (ref 5–40)
WBC # BLD AUTO: 4.61 10*3/MM3 (ref 3.4–10.8)

## 2021-06-07 ENCOUNTER — OFFICE VISIT (OUTPATIENT)
Dept: FAMILY MEDICINE CLINIC | Facility: CLINIC | Age: 66
End: 2021-06-07

## 2021-06-07 VITALS
WEIGHT: 153 LBS | HEART RATE: 61 BPM | OXYGEN SATURATION: 98 % | TEMPERATURE: 97.5 F | DIASTOLIC BLOOD PRESSURE: 72 MMHG | SYSTOLIC BLOOD PRESSURE: 120 MMHG | BODY MASS INDEX: 26.12 KG/M2 | RESPIRATION RATE: 16 BRPM | HEIGHT: 64 IN

## 2021-06-07 DIAGNOSIS — I25.10 CORONARY ARTERY DISEASE INVOLVING NATIVE HEART WITHOUT ANGINA PECTORIS, UNSPECIFIED VESSEL OR LESION TYPE: ICD-10-CM

## 2021-06-07 DIAGNOSIS — E78.2 MIXED HYPERLIPIDEMIA: ICD-10-CM

## 2021-06-07 DIAGNOSIS — R73.01 ELEVATED FASTING GLUCOSE: ICD-10-CM

## 2021-06-07 DIAGNOSIS — M54.50 CHRONIC BILATERAL LOW BACK PAIN WITHOUT SCIATICA: ICD-10-CM

## 2021-06-07 DIAGNOSIS — G89.29 CHRONIC BILATERAL LOW BACK PAIN WITHOUT SCIATICA: ICD-10-CM

## 2021-06-07 DIAGNOSIS — R94.31 ABNORMAL EKG: ICD-10-CM

## 2021-06-07 DIAGNOSIS — I10 ESSENTIAL HYPERTENSION: ICD-10-CM

## 2021-06-07 DIAGNOSIS — Z00.00 ANNUAL PHYSICAL EXAM: ICD-10-CM

## 2021-06-07 DIAGNOSIS — S46.911A STRAIN OF RIGHT SHOULDER, INITIAL ENCOUNTER: ICD-10-CM

## 2021-06-07 DIAGNOSIS — E83.52 SERUM CALCIUM ELEVATED: Primary | ICD-10-CM

## 2021-06-07 PROCEDURE — 99396 PREV VISIT EST AGE 40-64: CPT | Performed by: NURSE PRACTITIONER

## 2021-06-07 PROCEDURE — 93000 ELECTROCARDIOGRAM COMPLETE: CPT | Performed by: NURSE PRACTITIONER

## 2021-06-07 PROCEDURE — 99214 OFFICE O/P EST MOD 30 MIN: CPT | Performed by: NURSE PRACTITIONER

## 2021-06-07 RX ORDER — LISINOPRIL AND HYDROCHLOROTHIAZIDE 12.5; 1 MG/1; MG/1
1 TABLET ORAL DAILY
Qty: 90 TABLET | Refills: 3 | Status: SHIPPED | OUTPATIENT
Start: 2021-06-07 | End: 2022-05-31

## 2021-06-07 RX ORDER — PREDNISONE 20 MG/1
20 TABLET ORAL 2 TIMES DAILY
Qty: 14 TABLET | Refills: 0 | Status: SHIPPED | OUTPATIENT
Start: 2021-06-07 | End: 2021-07-22 | Stop reason: ALTCHOICE

## 2021-06-07 RX ORDER — AMLODIPINE BESYLATE 2.5 MG/1
2.5 TABLET ORAL DAILY
Qty: 90 TABLET | Refills: 3 | Status: SHIPPED | OUTPATIENT
Start: 2021-06-07 | End: 2022-12-06 | Stop reason: SDUPTHER

## 2021-06-07 RX ORDER — FENOFIBRATE 160 MG/1
160 TABLET ORAL DAILY
Qty: 90 TABLET | Refills: 3 | Status: SHIPPED | OUTPATIENT
Start: 2021-06-07 | End: 2022-12-06 | Stop reason: SDUPTHER

## 2021-06-07 NOTE — PROGRESS NOTES
Procedure     ECG 12 Lead    Date/Time: 6/7/2021 10:36 AM  Performed by: Milena Stevenson APRN  Authorized by: Milena Stevenson APRN   Previous ECG: no previous ECG available  Rhythm: sinus rhythm and sinus bradycardia  Rate: bradycardic  ST Segments: ST segments normal  T inversion: I, V4, aVL, V5 and V3  QRS axis: normal  Other findings: T wave abnormality    Clinical impression: abnormal EKG  Comments: P//126 ms  QRS 96 ms  QT/QTc 414/407 ms  HR 58

## 2021-06-07 NOTE — PROGRESS NOTES
"Subjective   Sera Salas is a 65 y.o. female.     History of Present Illness    Since the last visit, she has overall felt fairly well.  She has Essential Hypertension and well controlled on current medication, Hyperlipidemia with goals met with current Rx and Asthma well controlled and not requiring rescue Albuterol > 2 times a week.  she has been compliant with current medications have reviewed them.  The patient denies medication side effects.  Will refill medications. /72   Pulse 61   Temp 97.5 °F (36.4 °C)   Resp 16   Ht 162.6 cm (64\")   Wt 69.4 kg (153 lb)   SpO2 98%   BMI 26.26 kg/m²     Results for orders placed or performed in visit on 04/20/21   Comprehensive metabolic panel    Specimen: Blood   Result Value Ref Range    Glucose 107 (H) 65 - 99 mg/dL    BUN 23 8 - 23 mg/dL    Creatinine 0.90 0.57 - 1.00 mg/dL    eGFR Non African Am 63 >60 mL/min/1.73    eGFR African Am 77 >60 mL/min/1.73    BUN/Creatinine Ratio 25.6 (H) 7.0 - 25.0    Sodium 143 136 - 145 mmol/L    Potassium 4.3 3.5 - 5.2 mmol/L    Chloride 107 98 - 107 mmol/L    Total CO2 27.2 22.0 - 29.0 mmol/L    Calcium 11.1 (H) 8.6 - 10.5 mg/dL    Total Protein 7.3 6.0 - 8.5 g/dL    Albumin 5.20 3.50 - 5.20 g/dL    Globulin 2.1 gm/dL    A/G Ratio 2.5 g/dL    Total Bilirubin 0.6 0.0 - 1.2 mg/dL    Alkaline Phosphatase 103 39 - 117 U/L    AST (SGOT) 22 1 - 32 U/L    ALT (SGPT) 27 1 - 33 U/L   Lipid panel    Specimen: Blood   Result Value Ref Range    Total Cholesterol 212 (H) 0 - 200 mg/dL    Triglycerides 144 0 - 150 mg/dL    HDL Cholesterol 59 40 - 60 mg/dL    VLDL Cholesterol Shree 25 5 - 40 mg/dL    LDL Chol Calc (NIH) 128 (H) 0 - 100 mg/dL   TSH    Specimen: Blood   Result Value Ref Range    TSH 1.210 0.270 - 4.200 uIU/mL   Vitamin D 25 Hydroxy    Specimen: Blood   Result Value Ref Range    25 Hydroxy, Vitamin D 24.9 (L) 30.0 - 100.0 ng/ml   CBC and Differential    Specimen: Blood   Result Value Ref Range    WBC 4.61 3.40 - 10.80 " 10*3/mm3    RBC 4.55 3.77 - 5.28 10*6/mm3    Hemoglobin 14.6 12.0 - 15.9 g/dL    Hematocrit 43.6 34.0 - 46.6 %    MCV 95.8 79.0 - 97.0 fL    MCH 32.1 26.6 - 33.0 pg    MCHC 33.5 31.5 - 35.7 g/dL    RDW 12.8 12.3 - 15.4 %    Platelets 193 140 - 450 10*3/mm3    Neutrophil Rel % 44.5 42.7 - 76.0 %    Lymphocyte Rel % 38.4 19.6 - 45.3 %    Monocyte Rel % 10.2 5.0 - 12.0 %    Eosinophil Rel % 5.0 0.3 - 6.2 %    Basophil Rel % 1.5 0.0 - 1.5 %    Neutrophils Absolute 2.05 1.70 - 7.00 10*3/mm3    Lymphocytes Absolute 1.77 0.70 - 3.10 10*3/mm3    Monocytes Absolute 0.47 0.10 - 0.90 10*3/mm3    Eosinophils Absolute 0.23 0.00 - 0.40 10*3/mm3    Basophils Absolute 0.07 0.00 - 0.20 10*3/mm3    Immature Granulocyte Rel % 0.4 0.0 - 0.5 %    Immature Grans Absolute 0.02 0.00 - 0.05 10*3/mm3    nRBC 0.0 0.0 - 0.2 /100 WBC     She reports recurrence of right shoulder pain since her last visit in March. States prednisone helped and she would like to repeat treatment.     The following portions of the patient's history were reviewed and updated as appropriate: allergies, current medications, past family history, past medical history, past social history, past surgical history and problem list.    Review of Systems   Constitutional: Negative for unexpected weight change.   Respiratory: Negative for shortness of breath.    Cardiovascular: Negative for chest pain and palpitations.   Endocrine: Negative for cold intolerance, heat intolerance, polydipsia, polyphagia and polyuria.   Musculoskeletal: Positive for arthralgias.   Psychiatric/Behavioral: Negative for behavioral problems.       Objective   Physical Exam  Vitals and nursing note reviewed.   Constitutional:       Appearance: Normal appearance. She is well-developed.   Cardiovascular:      Rate and Rhythm: Normal rate and regular rhythm.   Pulmonary:      Effort: Pulmonary effort is normal.      Breath sounds: Normal breath sounds.   Neurological:      Mental Status: She is alert  and oriented to person, place, and time.   Psychiatric:         Mood and Affect: Mood normal.         Behavior: Behavior normal.         Thought Content: Thought content normal.         Judgment: Judgment normal.         Assessment/Plan   Diagnoses and all orders for this visit:    1. Serum calcium elevated (Primary)  -     PTH, Intact  -     Calcium, Ionized  -     Ambulatory Referral to ENT (Otolaryngology)    2. Elevated fasting glucose  -     Hemoglobin A1c    3. Mixed hyperlipidemia  -     fenofibrate 160 MG tablet; Take 1 tablet by mouth Daily.  Dispense: 90 tablet; Refill: 3  -     amLODIPine (NORVASC) 2.5 MG tablet; Take 1 tablet by mouth Daily.  Dispense: 90 tablet; Refill: 3  -     lisinopril-hydrochlorothiazide (PRINZIDE,ZESTORETIC) 10-12.5 MG per tablet; Take 1 tablet by mouth Daily.  Dispense: 90 tablet; Refill: 3    4. Essential hypertension  -     fenofibrate 160 MG tablet; Take 1 tablet by mouth Daily.  Dispense: 90 tablet; Refill: 3  -     amLODIPine (NORVASC) 2.5 MG tablet; Take 1 tablet by mouth Daily.  Dispense: 90 tablet; Refill: 3  -     lisinopril-hydrochlorothiazide (PRINZIDE,ZESTORETIC) 10-12.5 MG per tablet; Take 1 tablet by mouth Daily.  Dispense: 90 tablet; Refill: 3    5. Abnormal EKG  -     Ambulatory Referral to Cardiology    6. Coronary artery disease involving native heart without angina pectoris, unspecified vessel or lesion type  -     Ambulatory Referral to Cardiology    7. Strain of right shoulder, initial encounter  -     predniSONE (DELTASONE) 20 MG tablet; Take 1 tablet by mouth 2 (Two) Times a Day.  Dispense: 14 tablet; Refill: 0    8. Chronic bilateral low back pain without sciatica  -     predniSONE (DELTASONE) 20 MG tablet; Take 1 tablet by mouth 2 (Two) Times a Day.  Dispense: 14 tablet; Refill: 0    9. Annual physical exam  -     ECG 12 Lead

## 2021-06-07 NOTE — PROGRESS NOTES
Subjective   Sera Salas is a 65 y.o. female.     History of Present Illness   Sera Salas 65 y.o. female who presents for an Annual Wellness Visit.  she has a history of   Patient Active Problem List   Diagnosis   • Essential hypertension   • Hyperlipidemia   • Moderate persistent asthma without complication   .  she has been feeling well.  Labs results discussed in detail with the patient.  Plan to update vaccines if needed today.  I  reviewed health maintenance with her as part of my preventative care plan.    Health Habits:  Dental Exam. up to date  Eye Exam. up to date  Exercise: 2 times/week.  Current exercise activities include: walking  Preventative counseling provided and diet/exercise and vaccinations.       The following portions of the patient's history were reviewed and updated as appropriate: allergies, current medications, past family history, past medical history, past social history, past surgical history and problem list.    Review of Systems   Constitutional: Negative for activity change, appetite change, fatigue and unexpected weight change.   HENT: Negative for congestion.    Eyes: Negative for visual disturbance.   Respiratory: Negative for shortness of breath.    Cardiovascular: Negative for chest pain and palpitations.   Gastrointestinal: Negative for abdominal pain and constipation.   Endocrine: Negative for cold intolerance, heat intolerance, polydipsia, polyphagia and polyuria.   Genitourinary: Negative for difficulty urinating, dysuria and menstrual problem.   Musculoskeletal: Negative for arthralgias.   Skin: Negative for rash.   Allergic/Immunologic: Negative for immunocompromised state.   Neurological: Negative for headaches.   Hematological: Negative for adenopathy. Does not bruise/bleed easily.   Psychiatric/Behavioral: Negative for behavioral problems, dysphoric mood and sleep disturbance. The patient is not nervous/anxious.        Objective   Physical Exam  Vitals and nursing  note reviewed.   Constitutional:       General: She is not in acute distress.     Appearance: She is well-developed.   HENT:      Head: Normocephalic and atraumatic. Hair is normal.      Right Ear: Hearing, tympanic membrane, ear canal and external ear normal. No decreased hearing noted. No drainage.      Left Ear: Hearing, tympanic membrane, ear canal and external ear normal. No decreased hearing noted.      Nose: No nasal deformity.   Eyes:      General: Lids are normal.         Right eye: No discharge.         Left eye: No discharge.      Conjunctiva/sclera: Conjunctivae normal.      Pupils: Pupils are equal, round, and reactive to light.   Neck:      Thyroid: No thyromegaly.      Vascular: No JVD.      Trachea: No tracheal deviation.   Cardiovascular:      Rate and Rhythm: Normal rate and regular rhythm.      Pulses: Normal pulses.      Heart sounds: Normal heart sounds.   Pulmonary:      Effort: Pulmonary effort is normal. No respiratory distress.      Breath sounds: Normal breath sounds. No wheezing or rales.   Abdominal:      General: Bowel sounds are normal. There is no distension.      Palpations: Abdomen is soft. There is no mass.      Tenderness: There is no abdominal tenderness. There is no guarding or rebound.      Hernia: No hernia is present.   Musculoskeletal:         General: No tenderness or deformity. Normal range of motion.      Cervical back: Normal range of motion and neck supple.   Lymphadenopathy:      Cervical: No cervical adenopathy.   Skin:     General: Skin is warm and dry.      Findings: No erythema or rash.   Neurological:      Mental Status: She is alert and oriented to person, place, and time.      Cranial Nerves: No cranial nerve deficit.      Motor: No abnormal muscle tone.      Coordination: Coordination normal.      Deep Tendon Reflexes: Reflexes are normal and symmetric. Reflexes normal.   Psychiatric:         Speech: Speech normal.         Behavior: Behavior normal.          Thought Content: Thought content normal.         Judgment: Judgment normal.         Assessment/Plan   Diagnoses and all orders for this visit:    1. Serum calcium elevated (Primary)  -     PTH, Intact  -     Calcium, Ionized    2. Elevated fasting glucose  -     Hemoglobin A1c    3. Mixed hyperlipidemia  -     fenofibrate 160 MG tablet; Take 1 tablet by mouth Daily.  Dispense: 90 tablet; Refill: 3  -     amLODIPine (NORVASC) 2.5 MG tablet; Take 1 tablet by mouth Daily.  Dispense: 90 tablet; Refill: 3  -     lisinopril-hydrochlorothiazide (PRINZIDE,ZESTORETIC) 10-12.5 MG per tablet; Take 1 tablet by mouth Daily.  Dispense: 90 tablet; Refill: 3    4. Essential hypertension  -     fenofibrate 160 MG tablet; Take 1 tablet by mouth Daily.  Dispense: 90 tablet; Refill: 3  -     amLODIPine (NORVASC) 2.5 MG tablet; Take 1 tablet by mouth Daily.  Dispense: 90 tablet; Refill: 3  -     lisinopril-hydrochlorothiazide (PRINZIDE,ZESTORETIC) 10-12.5 MG per tablet; Take 1 tablet by mouth Daily.  Dispense: 90 tablet; Refill: 3    5. Abnormal EKG  -     Ambulatory Referral to Cardiology    6. Coronary artery disease involving native heart without angina pectoris, unspecified vessel or lesion type  -     Ambulatory Referral to Cardiology          Has seen cardiologist in [place] in the past but he moved to different location.  She was told she had CAD and had abnormal stress test and EKG.  She has not followed up since.  She denies chest pain or shortness of breath.  EKG today abnormal with inverted T waves.  Referred to Dr. Montelongo.     Also calcium has been elevated past couple of lab sets.  PTH and ionized calcium today.  Referral placed to Dr. Yan.

## 2021-06-08 LAB
CA-I SERPL ISE-MCNC: 6.1 MG/DL (ref 4.5–5.6)
HBA1C MFR BLD: 5.5 % (ref 4.8–5.6)
PTH-INTACT SERPL-MCNC: 44 PG/ML (ref 15–65)

## 2021-07-22 ENCOUNTER — OFFICE VISIT (OUTPATIENT)
Dept: CARDIOLOGY | Facility: CLINIC | Age: 66
End: 2021-07-22

## 2021-07-22 VITALS
DIASTOLIC BLOOD PRESSURE: 72 MMHG | HEART RATE: 78 BPM | HEIGHT: 64 IN | SYSTOLIC BLOOD PRESSURE: 120 MMHG | BODY MASS INDEX: 26.26 KG/M2 | OXYGEN SATURATION: 99 %

## 2021-07-22 DIAGNOSIS — I10 ESSENTIAL HYPERTENSION: Primary | ICD-10-CM

## 2021-07-22 PROCEDURE — 99204 OFFICE O/P NEW MOD 45 MIN: CPT | Performed by: INTERNAL MEDICINE

## 2021-07-22 NOTE — PROGRESS NOTES
"      CARDIOLOGY    Matty Montelongo MD    ENCOUNTER DATE:  07/22/2021    Sera Salas / 65 y.o. / female        CHIEF COMPLAINT / REASON FOR OFFICE VISIT     Abnormal ECG and Coronary Artery Disease      HISTORY OF PRESENT ILLNESS       HPI  Sera Salas is a 65 y.o. female who presents today for evaluation.  Patient was found to have an abnormal ECG in the past.  Patient underwent a stress treadmill and her EKG did not change.  She subsequently underwent a concurrent perfusion study which was normal.  Patient continues to do relatively well.  She denies chest pain shortness of breath lightheadedness.  She does say she rarely has discomfort in her chest she says it is a sharp shooting pain that lasts less than a second occurs very randomly.  She said she got a treadmill about 2 days ago walked about 25 minutes at a pace of about 2.4 mph.  She said she did not get chest discomfort she did get short of breath noted she work-up a sweat.  She had repeat ECG done that showed her T wave inversion in her lateral leads had changed and she was referred for further evaluation.      The following portions of the patient's history were reviewed and updated as appropriate: allergies, current medications, past family history, past medical history, past social history, past surgical history and problem list.      VITAL SIGNS     Visit Vitals  /72 (BP Location: Left arm)   Pulse 78   Ht 162.6 cm (64\")   SpO2 99%   BMI 26.26 kg/m²         Wt Readings from Last 3 Encounters:   06/07/21 69.4 kg (153 lb)   03/04/21 70.8 kg (156 lb)   10/05/20 70.3 kg (155 lb)     Body mass index is 26.26 kg/m².      REVIEW OF SYSTEMS   Review of Systems   All other systems reviewed and are negative.          PHYSICAL EXAMINATION     Vitals reviewed.   Constitutional:       Appearance: Healthy appearance.   Pulmonary:      Effort: Pulmonary effort is normal.   Cardiovascular:      Normal rate. Regular rhythm. Normal S1. Normal S2.      " Murmurs: There is no murmur.      No gallop. No click. No rub.   Pulses:     Intact distal pulses.   Edema:     Peripheral edema absent.   Neurological:      Mental Status: Alert and oriented to person, place and time.           REVIEWED DATA     Procedures    Cardiac Procedures:  1.     Lipid Panel    Lipid Panel 10/5/20 6/2/21   Total Cholesterol 216 (A) 212 (A)   Triglycerides 281 (A) 144   HDL Cholesterol 58 59   VLDL Cholesterol 56.2 25   LDL Cholesterol  102 (A) 128 (A)   (A) Abnormal value       Comments are available for some flowsheets but are not being displayed.               ASSESSMENT & PLAN      Diagnosis Plan   1. Essential hypertension           SUMMARY/DISCUSSION  1. Hypertension.  Blood pressures great  2. Abnormal ECG.  Patient had undergone a perfusion study at [facility] on 4/1/2019.  At that time it was reportedly to be normal.  She now presents after EKG change.  I was able to pull up all her ECG strips from the treadmill that was done here through [facility].  At 4 minutes into recovery on her stress test her ECG looks very similar as it did when her primary care provider repeated it.  Since she remains relatively asymptomatic I do not think further work-up is necessary at this time.  I do think it is important that she does exercise on a consistent basis however she has to do more quality exercise and she is currently doing.  I spent a long time explaining what that meant encourage her to do that.  Follow-up with her in a year sooner course if she develops symptoms.        MEDICATIONS         Discharge Medications          Accurate as of July 22, 2021 12:31 PM. If you have any questions, ask your nurse or doctor.            Continue These Medications      Instructions Start Date   albuterol sulfate  (90 Base) MCG/ACT inhaler  Commonly known as: PROVENTIL HFA;VENTOLIN HFA;PROAIR HFA   2 puffs, Inhalation, Every 4 Hours PRN      amLODIPine 2.5 MG tablet  Commonly known as:  NORVASC   2.5 mg, Oral, Daily      azelastine 0.1 % nasal spray  Commonly known as: ASTELIN   2 sprays, Nasal, 2 Times Daily, Use in each nostril as directed       cetirizine 10 MG tablet  Commonly known as: zyrTEC   10 mg, Oral, Daily      fenofibrate 160 MG tablet   160 mg, Oral, Daily      fluticasone-salmeterol 115-21 MCG/ACT inhaler  Commonly known as: ADVAIR HFA   2 puffs, Inhalation, 2 Times Daily - RT      lisinopril-hydrochlorothiazide 10-12.5 MG per tablet  Commonly known as: PRINZIDE,ZESTORETIC   1 tablet, Oral, Daily      montelukast 10 MG tablet  Commonly known as: SINGULAIR   10 mg, Oral, Nightly                 **Dragon Disclaimer:   Much of this encounter note is an electronic transcription/translation of spoken language to printed text. The electronic translation of spoken language may permit erroneous, or at times, nonsensical words or phrases to be inadvertently transcribed. Although I have reviewed the note for such errors, some may still exist.

## 2021-09-30 LAB
25(OH)D3+25(OH)D2 SERPL-MCNC: 19.3 NG/ML (ref 30–100)
CA-I SERPL ISE-MCNC: 6.4 MG/DL (ref 4.5–5.6)
PTH-INTACT SERPL-MCNC: 32 PG/ML (ref 15–65)

## 2021-10-08 ENCOUNTER — OFFICE VISIT (OUTPATIENT)
Dept: FAMILY MEDICINE CLINIC | Facility: CLINIC | Age: 66
End: 2021-10-08

## 2021-10-08 VITALS
SYSTOLIC BLOOD PRESSURE: 118 MMHG | RESPIRATION RATE: 16 BRPM | DIASTOLIC BLOOD PRESSURE: 78 MMHG | WEIGHT: 152.2 LBS | HEIGHT: 64 IN | TEMPERATURE: 97.7 F | HEART RATE: 68 BPM | BODY MASS INDEX: 25.99 KG/M2 | OXYGEN SATURATION: 100 %

## 2021-10-08 DIAGNOSIS — I82.492 ACUTE DEEP VEIN THROMBOSIS (DVT) OF OTHER SPECIFIED VEIN OF LEFT LOWER EXTREMITY (HCC): Primary | ICD-10-CM

## 2021-10-08 DIAGNOSIS — Z09 HOSPITAL DISCHARGE FOLLOW-UP: ICD-10-CM

## 2021-10-08 DIAGNOSIS — E89.2 S/P PARATHYROIDECTOMY (HCC): ICD-10-CM

## 2021-10-08 PROCEDURE — 99214 OFFICE O/P EST MOD 30 MIN: CPT | Performed by: NURSE PRACTITIONER

## 2021-10-08 RX ORDER — RIVAROXABAN 15 MG-20MG
KIT ORAL
COMMUNITY
Start: 2021-10-02 | End: 2022-03-04

## 2021-10-08 RX ORDER — ONDANSETRON 8 MG/1
TABLET, ORALLY DISINTEGRATING ORAL
COMMUNITY
Start: 2021-09-24 | End: 2022-05-31

## 2021-10-08 RX ORDER — CALCIUM CITRATE/VITAMIN D3 200MG-6.25
TABLET ORAL
COMMUNITY
Start: 2021-09-24 | End: 2023-02-07

## 2021-10-08 RX ORDER — HYDROCODONE BITARTRATE AND ACETAMINOPHEN 7.5; 325 MG/1; MG/1
TABLET ORAL
COMMUNITY
Start: 2021-09-24 | End: 2022-05-31

## 2021-10-08 NOTE — PROGRESS NOTES
Subjective   Sera Salas is a 66 y.o. female.     History of Present Illness   Sera Salas 66 y.o. female presents today for hosptial follow up.  she was treated [facility] for LLE DVT.  I reviewed all of the labs and diagnostic testing.  The patient's medications were changed:  Current outpatient and discharge medications have been reconciled for the patient.  Reviewed by: JOSÉ Diop    she does not have a follow up appointment with a specialist:       Hospital note as follows:    PCP is Milena Stevenson APRN    HPI:  Patient is a 66 yr/o female with a history of asthma and htn, who presents to the emergency department for evaluation of left lower extremity pain and swelling for one week. The patient is status post thyroid surgery x one week, performed by Dr. Yan. She had an outpatient venous Doppler prior to arrival that was positive for DVT. She denies chest pain or shortness of breath. Denies injury, fever, chills, nausea, vomiting, syncope, or other complaints at this time.  Progress Notes:    >> Discussed patient with Dr. Ybarra. He agrees with plan, and will evaluate pt at bedside.    >> Discussed with Dr. Ybarra. Patient had labs in June with normal creatinine and kidney function. He did not recommend labs today, as pt does not have hx of ckd. She does not have any contraindications for anticoagulation. Appears well. Will prescribe dose of xarleto while in the ED and have her to take as directed upon discharge. Gave anticoagulant precautions.    >> Pt recheck: Discussed ED findings and need for follow up. All discharge instructions discussed. In the discharge instructions the patient was instructed to follow up with PCP to have blood pressure rechecked. t improved and ready for discharge.     [employer] Payment System  Data Reporting Section    The following information is for internal use only for reporting specific care related data as required by The  Medicare Access & Chip Reauthorization Act of 2015. This data will be made available in aggregate to respective government and regulatory agencies.    CMS #317) Preventative Care and Screening: Screening for High Blood Pressure and Follow-Up Documented  * All patient's aged 18 and older who have been screened for high blood pressure and recommended follow-up.    Did the patient have SBP > 120 or DBP > 80 while in the ED? Yes    For those with elevated blood pressure, did you recommend follow-up for recheck? Yes        She has been taking Xarelto as instructed.  She reports improvement in pain and swelling to left calf. She denies shortness of breath or chest pain.  She is following up with Dr. Yan's office after her parathyroidectomy for repeat labs. She denies any history of blood clots or clotting disorder for her or family.  She has never smoked.  She was ambulatory immediately following her surgery.    The following portions of the patient's history were reviewed and updated as appropriate: allergies, current medications, past family history, past medical history, past social history, past surgical history and problem list.    Review of Systems   Constitutional: Negative for unexpected weight change.   Eyes: Negative for visual disturbance.   Respiratory: Negative for chest tightness and shortness of breath.    Cardiovascular: Negative for chest pain, palpitations and leg swelling.   Endocrine: Negative for cold intolerance, heat intolerance, polydipsia, polyphagia and polyuria.   Psychiatric/Behavioral: Negative for behavioral problems.       Objective   Physical Exam  Vitals and nursing note reviewed.   Constitutional:       Appearance: Normal appearance. She is well-developed.   Cardiovascular:      Rate and Rhythm: Normal rate and regular rhythm.   Pulmonary:      Effort: Pulmonary effort is normal.      Breath sounds: Normal breath sounds.   Musculoskeletal:      Right lower leg: No swelling. No edema.       Left lower leg: Tenderness present. No swelling, deformity, lacerations or bony tenderness. No edema.        Legs:    Neurological:      Mental Status: She is alert and oriented to person, place, and time.   Psychiatric:         Mood and Affect: Mood normal.         Behavior: Behavior normal.         Thought Content: Thought content normal.         Judgment: Judgment normal.         Assessment/Plan   Diagnoses and all orders for this visit:    1. Acute deep vein thrombosis (DVT) of other specified vein of left lower extremity (HCC) (Primary)  -     Ambulatory Referral to Hematology  -     rivaroxaban (XARELTO) 20 MG tablet; Take 1 tablet by mouth Daily.  Dispense: 30 tablet; Refill: 5  -     Duplex Venous Lower Extremity - Left CAR; Future    2. Hospital discharge follow-up    3. S/P parathyroidectomy ([facility])    Hospital records reviewed with pt confirming HPI.      Will refer to hematology for workup of clotting disorder.  Continue Xarelto and repeat US lower extremity in 6 months.

## 2021-10-29 ENCOUNTER — HOSPITAL ENCOUNTER (OUTPATIENT)
Dept: GENERAL RADIOLOGY | Facility: HOSPITAL | Age: 66
Discharge: HOME | End: 2021-10-29
Admitting: INTERNAL MEDICINE

## 2021-10-29 ENCOUNTER — LAB (OUTPATIENT)
Dept: LAB | Facility: HOSPITAL | Age: 66
End: 2021-10-29

## 2021-10-29 ENCOUNTER — APPOINTMENT (OUTPATIENT)
Dept: LAB | Facility: HOSPITAL | Age: 66
End: 2021-10-29

## 2021-10-29 ENCOUNTER — CONSULT (OUTPATIENT)
Dept: ONCOLOGY | Facility: CLINIC | Age: 66
End: 2021-10-29

## 2021-10-29 VITALS
RESPIRATION RATE: 18 BRPM | HEART RATE: 62 BPM | TEMPERATURE: 97.8 F | SYSTOLIC BLOOD PRESSURE: 142 MMHG | HEIGHT: 64 IN | BODY MASS INDEX: 25.9 KG/M2 | WEIGHT: 151.7 LBS | OXYGEN SATURATION: 98 % | DIASTOLIC BLOOD PRESSURE: 81 MMHG

## 2021-10-29 DIAGNOSIS — J45.40 MODERATE PERSISTENT ASTHMA WITHOUT COMPLICATION: Primary | ICD-10-CM

## 2021-10-29 DIAGNOSIS — I82.4Z9 DVT, LOWER EXTREMITY, DISTAL, ACUTE, UNSPECIFIED LATERALITY (HCC): ICD-10-CM

## 2021-10-29 LAB
BASOPHILS # BLD AUTO: 0.09 10*3/MM3 (ref 0–0.2)
BASOPHILS NFR BLD AUTO: 1.8 % (ref 0–1.5)
DEPRECATED RDW RBC AUTO: 44.2 FL (ref 37–54)
EOSINOPHIL # BLD AUTO: 0.16 10*3/MM3 (ref 0–0.4)
EOSINOPHIL NFR BLD AUTO: 3.2 % (ref 0.3–6.2)
ERYTHROCYTE [DISTWIDTH] IN BLOOD BY AUTOMATED COUNT: 12.9 % (ref 12.3–15.4)
HCT VFR BLD AUTO: 41.8 % (ref 34–46.6)
HGB BLD-MCNC: 13.8 G/DL (ref 12–15.9)
IMM GRANULOCYTES # BLD AUTO: 0.03 10*3/MM3 (ref 0–0.05)
IMM GRANULOCYTES NFR BLD AUTO: 0.6 % (ref 0–0.5)
LYMPHOCYTES # BLD AUTO: 1.74 10*3/MM3 (ref 0.7–3.1)
LYMPHOCYTES NFR BLD AUTO: 35.2 % (ref 19.6–45.3)
MCH RBC QN AUTO: 31.1 PG (ref 26.6–33)
MCHC RBC AUTO-ENTMCNC: 33 G/DL (ref 31.5–35.7)
MCV RBC AUTO: 94.1 FL (ref 79–97)
MONOCYTES # BLD AUTO: 0.46 10*3/MM3 (ref 0.1–0.9)
MONOCYTES NFR BLD AUTO: 9.3 % (ref 5–12)
NEUTROPHILS NFR BLD AUTO: 2.47 10*3/MM3 (ref 1.7–7)
NEUTROPHILS NFR BLD AUTO: 49.9 % (ref 42.7–76)
NRBC BLD AUTO-RTO: 0 /100 WBC (ref 0–0.2)
PLATELET # BLD AUTO: 151 10*3/MM3 (ref 140–450)
PMV BLD AUTO: 10.8 FL (ref 6–12)
RBC # BLD AUTO: 4.44 10*6/MM3 (ref 3.77–5.28)
WBC # BLD AUTO: 4.95 10*3/MM3 (ref 3.4–10.8)

## 2021-10-29 PROCEDURE — 71046 X-RAY EXAM CHEST 2 VIEWS: CPT

## 2021-10-29 PROCEDURE — 99205 OFFICE O/P NEW HI 60 MIN: CPT | Performed by: INTERNAL MEDICINE

## 2021-10-29 PROCEDURE — 36415 COLL VENOUS BLD VENIPUNCTURE: CPT

## 2021-10-29 PROCEDURE — 85025 COMPLETE CBC W/AUTO DIFF WBC: CPT

## 2021-10-29 NOTE — PROGRESS NOTES
Subjective Patient concerned about multiple issues    REASON FOR CONSULTATION: Acute left lower extremity DVT  Provide an opinion on any further workup or treatment                             REQUESTING PHYSICIAN: JOSÉ Valera    RECORDS OBTAINED:  Records of the patients history including those obtained from the referring provider were reviewed and summarized in detail.    HISTORY OF PRESENT ILLNESS:  The patient is a 66 y.o. year old female who is here for an opinion about the above issue.    History of Present Illness   The patient is a 66-year-old female followed by primary care with hypercalcemia (hyperparathyroidism), glucose intolerance, asthma, hyperlipidemia, hypertension, and coronary artery disease as well as chronic back pain as well as coronary artery disease.  She was seen by cardiology 7/22/2021 having undergone stress testing and was felt to be stable.    She had follow-up with ENT 9/24 having undergone a hemithyroidectomy with removal of this  portion of the left thyroid lobe, parathyroidectomy for parathyroid adenoma.  The pathology is not immediately available for review.  The patient states she is also undergoing assessment for hearing loss by Dr. Jeevan Sharma with an MRI plan to rule out acoustic neuroma.  Additionally she has had previous colonoscopy in the last several years without abnormalities noted.         Following her surgery she had left calf pain for approximately week and was seen at [facility] facility 10/1/2021 with evidence of acute, occlusive DVT in the popliteal, posterior tibial and peroneal veins.  The patient states that this DVT development began 1 day post surgery.  She had been without any discomfort or swelling in the extremity prior to the procedure and compression devices were used perioperatively.  She was placed on Xarelto starter pack and has since transitioned to 20 mg daily.  She states, at the time of this consultation, that she no longer is swelling in the  extremity and feels well.  There is no family history that she is aware of any thrombotic tendencies.  Past Medical History:   Diagnosis Date   • Anxiety    • Asthma    • CAD (coronary artery disease)    • DVT, lower extremity, distal, acute, unspecified laterality (HCC)    • H/O bone density study DUE   • H/O complete eye exam DUE   • Hyperlipidemia    • Hypertension    • Seasonal allergies         Past Surgical History:   Procedure Laterality Date   •  SECTION     • COLONOSCOPY     • MAMMO BILATERAL  DUE   • PAP SMEAR  DUE   • THYROID SURGERY     • TUBAL ABDOMINAL LIGATION          Current Outpatient Medications on File Prior to Visit   Medication Sig Dispense Refill   • albuterol (PROVENTIL HFA;VENTOLIN HFA) 108 (90 BASE) MCG/ACT inhaler Inhale 2 puffs Every 4 (Four) Hours As Needed for Wheezing.     • amLODIPine (NORVASC) 2.5 MG tablet Take 1 tablet by mouth Daily. 90 tablet 3   • azelastine (ASTELIN) 0.1 % nasal spray 2 sprays into each nostril 2 (Two) Times a Day. Use in each nostril as directed     • cetirizine (ZyrTEC) 10 MG tablet Take 10 mg by mouth daily.     • fenofibrate 160 MG tablet Take 1 tablet by mouth Daily. 90 tablet 3   • fluticasone-salmeterol (ADVAIR HFA) 115-21 MCG/ACT inhaler Inhale 2 puffs 2 (Two) Times a Day.     • HYDROcodone-acetaminophen (NORCO) 7.5-325 MG per tablet TAKE 1 TO 2 TABLETS BY MOUTH EVERY 4 TO 6 HOURS     • lisinopril-hydrochlorothiazide (PRINZIDE,ZESTORETIC) 10-12.5 MG per tablet Take 1 tablet by mouth Daily. 90 tablet 3   • montelukast (SINGULAIR) 10 MG tablet Take 10 mg by mouth every night.     • Multiple Minerals-Vitamins (Citracal Maximum Plus) tablet TAKE 1 TABLET BY MOUTH FIVE TIMES DAILY FOR 7 DAYS THEN DECREASE TO 1 TABLET THREE TIMES DAILY FOR 7 DAYS     • ondansetron ODT (ZOFRAN-ODT) 8 MG disintegrating tablet DISSOLVE 1 TABLET ON THE TONGUE EVERY 6 HOURS FOR 7 DAYS     • rivaroxaban (XARELTO) 20 MG tablet Take 1 tablet by mouth Daily. 30 tablet 5  "  • Xarelto Starter Pack tablet therapy pack starter pack Take one 15 mg tablet twice daily with food for 21 days.  Followed by one 20 mg tablet by mouth once daily with food.       No current facility-administered medications on file prior to visit.        ALLERGIES:  No Known Allergies     Social History     Socioeconomic History   • Marital status:      Spouse name: Marlon   • Number of children: 2   Tobacco Use   • Smoking status: Never Smoker   • Smokeless tobacco: Never Used   • Tobacco comment: caffeine use 1-2 sodas daily   Substance and Sexual Activity   • Alcohol use: No   • Drug use: No   • Sexual activity: Defer        Family History   Problem Relation Age of Onset   • Heart disease Other    • Hypertension Other    • Stroke Other    • Stroke Mother    • Heart disease Brother    • Hypertension Brother    • Diabetes Brother    • Heart disease Brother         Review of Systems   Constitutional: Negative for activity change, fatigue, fever and unexpected weight change.   HENT: Positive for tinnitus.         See history of present illness   Eyes: Negative.    Respiratory: Positive for cough (Increased general secretions including mucus).    Cardiovascular: Negative.    Gastrointestinal: Negative.    Genitourinary: Negative.    Musculoskeletal: Negative.    Skin: Negative for color change.   Allergic/Immunologic: Negative.    Neurological: Negative.    Psychiatric/Behavioral: Negative.         Objective     Vitals:    10/29/21 1031   BP: 142/81   Pulse: 62   Resp: 18   Temp: 97.8 °F (36.6 °C)   TempSrc: Temporal   SpO2: 98%   Weight: 68.8 kg (151 lb 11.2 oz)   Height: 162.6 cm (64.02\")   PainSc: 0-No pain     Current Status 10/29/2021   ECOG score 0       Physical Exam  Constitutional:       Appearance: Normal appearance.   HENT:      Nose: Nose normal.      Mouth/Throat:      Mouth: Mucous membranes are moist.      Pharynx: Oropharynx is clear.   Eyes:      Extraocular Movements: Extraocular movements " intact.      Conjunctiva/sclera: Conjunctivae normal.      Pupils: Pupils are equal, round, and reactive to light.   Cardiovascular:      Rate and Rhythm: Normal rate and regular rhythm.      Pulses: Normal pulses.      Heart sounds: Normal heart sounds.   Pulmonary:      Effort: Pulmonary effort is normal.      Breath sounds: Normal breath sounds.   Abdominal:      General: Bowel sounds are normal.      Palpations: Abdomen is soft.   Musculoskeletal:         General: No swelling or tenderness. Normal range of motion.      Cervical back: Normal range of motion and neck supple.   Skin:     General: Skin is warm.   Neurological:      General: No focal deficit present.      Mental Status: She is alert and oriented to person, place, and time. Mental status is at baseline.           RECENT LABS:  Hematology WBC   Date Value Ref Range Status   10/29/2021 4.95 3.40 - 10.80 10*3/mm3 Final   06/02/2021 4.61 3.40 - 10.80 10*3/mm3 Final     RBC   Date Value Ref Range Status   10/29/2021 4.44 3.77 - 5.28 10*6/mm3 Final   06/02/2021 4.55 3.77 - 5.28 10*6/mm3 Final     Hemoglobin   Date Value Ref Range Status   10/29/2021 13.8 12.0 - 15.9 g/dL Final     Hematocrit   Date Value Ref Range Status   10/29/2021 41.8 34.0 - 46.6 % Final     Platelets   Date Value Ref Range Status   10/29/2021 151 140 - 450 10*3/mm3 Final          Assessment/Plan      66-year-old female followed with a medical history of asthma, hyperlipidemia, hypertension and potentially coronary artery disease with recent development of hypercalcemia secondary to hyperparathyroidism.  She underwent a hemithyroidectomy with removal of this portion of the left thyroid gland, parathyroidectomy for parathyroid adenoma.  As we review the pathology results she, additionally, had noted hearing loss and is to have an MRI to rule out acoustic neuroma.  Her other screening has been negative.  Following her surgery unexpectedly within 24 hours she developed a swollen left lower  extremity and was found to have evidence of acute, occlusive DVT in the popliteal, posterior tibial and peroneal veins.  She was placed on and is responding to Xarelto anticoagulation.     We have discussed that there was some provocation for this, albeit very modest, and that 1 would want to assess her hypercoagulable status though do this at a point when she is off Xarelto since this would confuse some of the results.  After additional discussion we have planned:    *Undergo chest x-ray today    *Proceed with MRI as planned through ENT for acoustic neuroma    *Return 11/9/2021 for hypercoagulable laboratories after she been off of Xarelto 24 hours which she will plan to hold 11 8/21.    *2 weeks after 11/9/2021 studies patient will be seen back by the physician to review her status and determine length of time on anticoagulation  with repeat lower extremity Doppler.    *Patient agreeable with this plan and follow-up      I spent 60 minutes caring for Sera on this date of service. This time includes time spent by me in the following activities: reviewing tests, obtaining and/or reviewing a separately obtained history, performing a medically appropriate examination and/or evaluation, counseling and educating the patient/family/caregiver, ordering medications, tests, or procedures, referring and communicating with other health care professionals, documenting information in the medical record, independently interpreting results and communicating that information with the patient/family/caregiver and care coordination.

## 2021-11-09 ENCOUNTER — APPOINTMENT (OUTPATIENT)
Dept: OTHER | Facility: HOSPITAL | Age: 66
End: 2021-11-09

## 2021-11-16 ENCOUNTER — LAB (OUTPATIENT)
Dept: OTHER | Facility: HOSPITAL | Age: 66
End: 2021-11-16

## 2021-11-16 DIAGNOSIS — I82.4Z9 DVT, LOWER EXTREMITY, DISTAL, ACUTE, UNSPECIFIED LATERALITY (HCC): ICD-10-CM

## 2021-11-16 LAB
ALBUMIN SERPL-MCNC: 5 G/DL (ref 3.5–5.2)
ALBUMIN/GLOB SERPL: 1.7 G/DL
ALP SERPL-CCNC: 103 U/L (ref 39–117)
ALT SERPL W P-5'-P-CCNC: 21 U/L (ref 1–33)
ANION GAP SERPL CALCULATED.3IONS-SCNC: 10 MMOL/L (ref 5–15)
AST SERPL-CCNC: 19 U/L (ref 1–32)
BILIRUB SERPL-MCNC: 0.5 MG/DL (ref 0–1.2)
BUN SERPL-MCNC: 25 MG/DL (ref 8–23)
BUN/CREAT SERPL: 31.6 (ref 7–25)
CALCIUM SPEC-SCNC: 11 MG/DL (ref 8.6–10.5)
CHLORIDE SERPL-SCNC: 103 MMOL/L (ref 98–107)
CO2 SERPL-SCNC: 25 MMOL/L (ref 22–29)
CREAT SERPL-MCNC: 0.79 MG/DL (ref 0.57–1)
GFR SERPL CREATININE-BSD FRML MDRD: 73 ML/MIN/1.73
GLOBULIN UR ELPH-MCNC: 3 GM/DL
GLUCOSE SERPL-MCNC: 89 MG/DL (ref 65–99)
HCYS SERPL-MCNC: 14 UMOL/L (ref 0–15)
POTASSIUM SERPL-SCNC: 3.5 MMOL/L (ref 3.5–5.2)
PROT SERPL-MCNC: 8 G/DL (ref 6–8.5)
SODIUM SERPL-SCNC: 138 MMOL/L (ref 136–145)

## 2021-11-16 PROCEDURE — 36415 COLL VENOUS BLD VENIPUNCTURE: CPT

## 2021-11-16 PROCEDURE — 80053 COMPREHEN METABOLIC PANEL: CPT | Performed by: INTERNAL MEDICINE

## 2021-11-16 PROCEDURE — 83090 ASSAY OF HOMOCYSTEINE: CPT | Performed by: INTERNAL MEDICINE

## 2021-11-16 PROCEDURE — 81241 F5 GENE: CPT | Performed by: INTERNAL MEDICINE

## 2021-11-16 PROCEDURE — 85613 RUSSELL VIPER VENOM DILUTED: CPT | Performed by: INTERNAL MEDICINE

## 2021-11-16 PROCEDURE — 86146 BETA-2 GLYCOPROTEIN ANTIBODY: CPT | Performed by: INTERNAL MEDICINE

## 2021-11-16 PROCEDURE — 85732 THROMBOPLASTIN TIME PARTIAL: CPT | Performed by: INTERNAL MEDICINE

## 2021-11-16 PROCEDURE — 86147 CARDIOLIPIN ANTIBODY EA IG: CPT | Performed by: INTERNAL MEDICINE

## 2021-11-16 PROCEDURE — 85240 CLOT FACTOR VIII AHG 1 STAGE: CPT | Performed by: INTERNAL MEDICINE

## 2021-11-16 PROCEDURE — 81240 F2 GENE: CPT | Performed by: INTERNAL MEDICINE

## 2021-11-17 LAB
CARDIOLIPIN IGG SER IA-ACNC: <9 GPL U/ML (ref 0–14)
CARDIOLIPIN IGM SER IA-ACNC: 9 MPL U/ML (ref 0–12)
F5 GENE MUT ANL BLD/T: ABNORMAL
FACTOR II, DNA ANALYSIS: NORMAL

## 2021-11-18 LAB
DRVVT SCREEN TO CONFIRM RATIO: 1.3 RATIO (ref 0.8–1.2)
LA 2 SCREEN W REFLEX-IMP: ABNORMAL
MIXING DRVVT: 45.2 SEC (ref 0–40.4)
SCREEN APTT: 37.4 SEC (ref 0–51.9)
SCREEN DRVVT: 50.5 SEC (ref 0–47)

## 2021-11-19 LAB
B2 GLYCOPROT1 IGA SER-ACNC: <9 GPI IGA UNITS (ref 0–25)
B2 GLYCOPROT1 IGG SER-ACNC: <9 GPI IGG UNITS (ref 0–20)
B2 GLYCOPROT1 IGM SER-ACNC: <9 GPI IGM UNITS (ref 0–32)
FACT VIII ACT/NOR PPP: 116 % ACTIVITY (ref 60–150)
Lab: NORMAL

## 2021-11-23 ENCOUNTER — APPOINTMENT (OUTPATIENT)
Dept: OTHER | Facility: HOSPITAL | Age: 66
End: 2021-11-23

## 2021-11-23 ENCOUNTER — OFFICE VISIT (OUTPATIENT)
Dept: ONCOLOGY | Facility: CLINIC | Age: 66
End: 2021-11-23

## 2021-11-23 VITALS
HEIGHT: 64 IN | WEIGHT: 152 LBS | DIASTOLIC BLOOD PRESSURE: 73 MMHG | RESPIRATION RATE: 18 BRPM | OXYGEN SATURATION: 96 % | BODY MASS INDEX: 25.95 KG/M2 | TEMPERATURE: 97 F | SYSTOLIC BLOOD PRESSURE: 112 MMHG | HEART RATE: 60 BPM

## 2021-11-23 DIAGNOSIS — I82.4Z9 DVT, LOWER EXTREMITY, DISTAL, ACUTE, UNSPECIFIED LATERALITY (HCC): Primary | ICD-10-CM

## 2021-11-23 PROCEDURE — 99214 OFFICE O/P EST MOD 30 MIN: CPT | Performed by: INTERNAL MEDICINE

## 2021-11-23 NOTE — PROGRESS NOTES
Subjective Discussed findings-positive lupus anticoagulant, heterozygosity for factor V Leiden,    REASON FOR FOLLOW-UP: Acute left lower extremity DVT    History of Present Illness   The patient is a 66-year-old female followed by primary care with hypercalcemia (hyperparathyroidism), glucose intolerance, asthma, hyperlipidemia, hypertension, and coronary artery disease as well as chronic back pain as well as coronary artery disease.  She was seen by cardiology 7/22/2021 having undergone stress testing and was felt to be stable.    She had follow-up with ENT 9/24 having undergone a hemithyroidectomy with removal of this  portion of the left thyroid lobe, parathyroidectomy for parathyroid adenoma.  The pathology is not immediately available for review.  The patient states she is also undergoing assessment for hearing loss by Dr. Jeevan Sharma with an MRI plan to rule out acoustic neuroma.  Additionally she has had previous colonoscopy in the last several years without abnormalities noted.         Following her surgery she had left calf pain for approximately week and was seen at [facility] facility 10/1/2021 with evidence of acute, occlusive DVT in the popliteal, posterior tibial and peroneal veins.  The patient states that this DVT development began 1 day post surgery.  She had been without any discomfort or swelling in the extremity prior to the procedure and compression devices were used perioperatively.  She was placed on Xarelto starter pack and has since transitioned to 20 mg daily.  She states, at the time of this consultation, that she no longer is swelling in the extremity and feels well.  There is no family history that she is aware of any thrombotic tendencies.    The patient returns for testing obtained 11/16/2021 with heterozygous findings for factor V Leiden, no prothrombin gene mutation, negative lupus anticoagulant testing, lupus anticoagulant found to be positive, beta-2 glycoprotein studies negative,  Factor VIII of 116, normal homocysteine.  The patient indicates that she has been advised by her ENT physician that she remains hypercalcemic, unexpectedly so, may require more surgery,      Past Medical History:   Diagnosis Date   • Anxiety    • Arthritis    • Asthma    • CAD (coronary artery disease)    • Disease of thyroid gland    • DVT, lower extremity, distal, acute, unspecified laterality (HCC)    • H/O bone density study DUE   • H/O complete eye exam DUE   • Hyperlipidemia    • Hypertension    • Seasonal allergies         Past Surgical History:   Procedure Laterality Date   •  SECTION  ,    • COLONOSCOPY     • MAMMO BILATERAL  DUE   • PAP SMEAR  DUE   • THYROID SURGERY     • TUBAL ABDOMINAL LIGATION          Current Outpatient Medications on File Prior to Visit   Medication Sig Dispense Refill   • albuterol (PROVENTIL HFA;VENTOLIN HFA) 108 (90 BASE) MCG/ACT inhaler Inhale 2 puffs Every 4 (Four) Hours As Needed for Wheezing.     • amLODIPine (NORVASC) 2.5 MG tablet Take 1 tablet by mouth Daily. 90 tablet 3   • azelastine (ASTELIN) 0.1 % nasal spray 2 sprays into each nostril 2 (Two) Times a Day. Use in each nostril as directed     • cetirizine (ZyrTEC) 10 MG tablet Take 10 mg by mouth daily.     • fenofibrate 160 MG tablet Take 1 tablet by mouth Daily. 90 tablet 3   • fluticasone-salmeterol (ADVAIR HFA) 115-21 MCG/ACT inhaler Inhale 2 puffs 2 (Two) Times a Day.     • lisinopril-hydrochlorothiazide (PRINZIDE,ZESTORETIC) 10-12.5 MG per tablet Take 1 tablet by mouth Daily. 90 tablet 3   • montelukast (SINGULAIR) 10 MG tablet Take 10 mg by mouth every night.     • Multiple Minerals-Vitamins (Citracal Maximum Plus) tablet TAKE 1 TABLET BY MOUTH FIVE TIMES DAILY FOR 7 DAYS THEN DECREASE TO 1 TABLET THREE TIMES DAILY FOR 7 DAYS     • ondansetron ODT (ZOFRAN-ODT) 8 MG disintegrating tablet DISSOLVE 1 TABLET ON THE TONGUE EVERY 6 HOURS FOR 7 DAYS     • rivaroxaban (XARELTO) 20 MG tablet Take 1  "tablet by mouth Daily. 30 tablet 5   • Rivaroxaban (XARELTO) tablet therapy pack starter pack Take as directed. Take 15mg twice daily for 2 days, followed by 20mg once daily with food..     • Xarelto Starter Pack tablet therapy pack starter pack Take one 15 mg tablet twice daily with food for 21 days.  Followed by one 20 mg tablet by mouth once daily with food.     • HYDROcodone-acetaminophen (NORCO) 7.5-325 MG per tablet TAKE 1 TO 2 TABLETS BY MOUTH EVERY 4 TO 6 HOURS       No current facility-administered medications on file prior to visit.        ALLERGIES:  No Known Allergies     Social History     Socioeconomic History   • Marital status:      Spouse name: Marlon   • Number of children: 2   • Years of education: High school   Tobacco Use   • Smoking status: Never Smoker   • Smokeless tobacco: Never Used   • Tobacco comment: caffeine use 1-2 sodas daily   Substance and Sexual Activity   • Alcohol use: No   • Drug use: No   • Sexual activity: Defer        Family History   Problem Relation Age of Onset   • Heart disease Other    • Hypertension Other    • Stroke Other    • Stroke Mother    • Heart disease Brother    • Hypertension Brother    • Diabetes Brother    • Heart disease Brother         Review of Systems   Constitutional: Negative for activity change, fatigue, fever and unexpected weight change.   HENT: Positive for tinnitus.         See history of present illness   Eyes: Negative.    Respiratory: Positive for cough (Increased general secretions including mucus).    Cardiovascular: Negative.    Gastrointestinal: Negative.    Genitourinary: Negative.    Musculoskeletal: Negative.    Skin: Negative for color change.   Allergic/Immunologic: Negative.    Neurological: Negative.    Psychiatric/Behavioral: Negative.         Objective     Vitals:    11/23/21 1436   BP: 112/73   Pulse: 60   Resp: 18   Temp: 97 °F (36.1 °C)   TempSrc: Temporal   SpO2: 96%   Weight: 68.9 kg (152 lb)   Height: 162.6 cm (64.02\") "   PainSc: 0-No pain     Current Status 11/23/2021   ECOG score 0       Physical Exam  Constitutional:       Appearance: Normal appearance.   HENT:      Nose: Nose normal.      Mouth/Throat:      Mouth: Mucous membranes are moist.      Pharynx: Oropharynx is clear.   Eyes:      Extraocular Movements: Extraocular movements intact.      Conjunctiva/sclera: Conjunctivae normal.      Pupils: Pupils are equal, round, and reactive to light.   Cardiovascular:      Rate and Rhythm: Normal rate and regular rhythm.      Pulses: Normal pulses.      Heart sounds: Normal heart sounds.   Pulmonary:      Effort: Pulmonary effort is normal.      Breath sounds: Normal breath sounds.   Abdominal:      General: Bowel sounds are normal.      Palpations: Abdomen is soft.   Musculoskeletal:         General: No swelling or tenderness. Normal range of motion.      Cervical back: Normal range of motion and neck supple.   Skin:     General: Skin is warm.   Neurological:      General: No focal deficit present.      Mental Status: She is alert and oriented to person, place, and time. Mental status is at baseline.           RECENT LABS:  Hematology WBC   Date Value Ref Range Status   10/29/2021 4.95 3.40 - 10.80 10*3/mm3 Final   06/02/2021 4.61 3.40 - 10.80 10*3/mm3 Final     RBC   Date Value Ref Range Status   10/29/2021 4.44 3.77 - 5.28 10*6/mm3 Final   06/02/2021 4.55 3.77 - 5.28 10*6/mm3 Final     Hemoglobin   Date Value Ref Range Status   10/29/2021 13.8 12.0 - 15.9 g/dL Final     Hematocrit   Date Value Ref Range Status   10/29/2021 41.8 34.0 - 46.6 % Final     Platelets   Date Value Ref Range Status   10/29/2021 151 140 - 450 10*3/mm3 Final          Assessment/Plan      66-year-old female followed with a medical history of asthma, hyperlipidemia, hypertension and potentially coronary artery disease with recent development of hypercalcemia secondary to hyperparathyroidism.  She underwent a hemithyroidectomy with removal of this portion of  the left thyroid gland, parathyroidectomy for parathyroid adenoma.  As we review the pathology results she, additionally, had noted hearing loss and is to have an MRI to rule out acoustic neuroma.  Her other screening has been negative.  Following her surgery unexpectedly within 24 hours she developed a swollen left lower extremity and was found to have evidence of acute, occlusive DVT in the popliteal, posterior tibial and peroneal veins.  She was placed on and is responding to Xarelto anticoagulation.     We have discussed that there was some provocation for this, albeit very modest, and that 1 would want to assess her hypercoagulable status though do this at a point when she is off Xarelto since this would confuse some of the results.  After additional discussion we proceeded with a chest x-ray that was normal and laboratory testing with heterozygous findings for factor V Leiden, no prothrombin gene mutation, negative lupus anticoagulant testing, lupus anticoagulant found to be positive, beta-2 glycoprotein studies negative, Factor VIII of 116, normal homocysteine.  The patient indicates that she has been advised by her ENT physician that she remains hypercalcemic, unexpectedly so, may require more surgery.      She did, incidentally, hold her Xarelto prior to retesting for lupus anticoagulant.  The patient is reassessed 11/23/2021 we have discussed rechecking her lupus anticoagulant 12 weeks to be certain of whether this continues to be elevated and potentially satisfies criteria for antiphospholipid antibody syndrome.  The patient currently continues to respond to Xarelto.      Plan:  *Continue Xarelto at current dosing    *Patient educated per laboratory findings thus far so that she can also review this with her family.    *Return in 10 weeks for repeat lupus anticoagulant testing and left lower extremity Doppler testing    *12 weeks return MD assessment    *ENT follow-up as scheduled in the  interval

## 2022-01-17 ENCOUNTER — TELEPHONE (OUTPATIENT)
Dept: ONCOLOGY | Facility: CLINIC | Age: 67
End: 2022-01-17

## 2022-01-17 NOTE — TELEPHONE ENCOUNTER
Provider: DR BHAT  Caller: NGUYỄN CABELLO   Relationship to Patient: SELF    Phone Number: [phone]  Reason for Call: PT HAS A LETTER FROM HER INSURANCE COMPANY, STATES THAT HER LAST LAB TEST SHE HAD DONE WILL NOT BE COVERED BY INSURANCE AS THE TEST WAS NOT NECESSARY, PT IS WANTING OFFICE TO LET INSURANCE KNOW THE LAB TEST WAS NEEDED SO  THEY WILL PAY FOR HER LABS,    TEST WAS USED TO CHECK FOR GENE CHANGES, PT WOULD LIKE SOMEONE TO CALL HER BACK ABOUT THIS

## 2022-02-15 ENCOUNTER — TELEPHONE (OUTPATIENT)
Dept: ONCOLOGY | Facility: CLINIC | Age: 67
End: 2022-02-15

## 2022-02-15 NOTE — TELEPHONE ENCOUNTER
Returned call to patient who is wanting to cancel her lab appointment, I left message for her to return my call.

## 2022-02-15 NOTE — TELEPHONE ENCOUNTER
Patient returned call and she wants to cancel the lab appointment with Dr. Jones.  Will cancel.  She will see Dr. Jones and will discuss labs with him at that time.

## 2022-02-15 NOTE — TELEPHONE ENCOUNTER
Provider: DR BHAT  Caller: CESAR  Relationship to Patient: SELF        Reason for Call: CESAR HAS AN APPT FOR 3-4 FOR LABS AND FOLLOW UP, SHE WANTED TO CANCEL HER LABS BECAUSE SHE HAD SOME LAB WORK SIMILAR TO WHAT DR BHAT DRAWS AND SHE IS AFRAID THAT HER INS WILL NOT PAY FOR IT.    ALSO SHE IS WANTING TO SPEAK TO SOMEONE REGARDED THE LUPUS TEST THAT HER INS DID NOT PAY FOR PREVIOUSLY.  SHE ONLY HAS 90 DAYS TO FILE AN APPEAL WITH INSURANCE    PLEASE ADVISE

## 2022-02-18 ENCOUNTER — HOSPITAL ENCOUNTER (OUTPATIENT)
Dept: CARDIOLOGY | Facility: HOSPITAL | Age: 67
Discharge: HOME | End: 2022-02-18
Admitting: INTERNAL MEDICINE

## 2022-02-18 DIAGNOSIS — I82.4Z9 DVT, LOWER EXTREMITY, DISTAL, ACUTE, UNSPECIFIED LATERALITY: ICD-10-CM

## 2022-02-18 LAB
BH CV LOW VAS LEFT COMMON FEMORAL SPONT: 1
BH CV LOW VAS LEFT POPLITEAL SPONT: 1
BH CV LOWER VASCULAR LEFT COMMON FEMORAL AUGMENT: NORMAL
BH CV LOWER VASCULAR LEFT COMMON FEMORAL COMPETENT: NORMAL
BH CV LOWER VASCULAR LEFT COMMON FEMORAL COMPRESS: NORMAL
BH CV LOWER VASCULAR LEFT COMMON FEMORAL PHASIC: NORMAL
BH CV LOWER VASCULAR LEFT COMMON FEMORAL SPONT: NORMAL
BH CV LOWER VASCULAR LEFT DISTAL FEMORAL COMPRESS: NORMAL
BH CV LOWER VASCULAR LEFT GASTRONEMIUS COMPRESS: NORMAL
BH CV LOWER VASCULAR LEFT GREATER SAPH AK COMPRESS: NORMAL
BH CV LOWER VASCULAR LEFT GREATER SAPH BK COMPRESS: NORMAL
BH CV LOWER VASCULAR LEFT LESSER SAPH COMPRESS: NORMAL
BH CV LOWER VASCULAR LEFT MID FEMORAL AUGMENT: NORMAL
BH CV LOWER VASCULAR LEFT MID FEMORAL COMPETENT: NORMAL
BH CV LOWER VASCULAR LEFT MID FEMORAL COMPRESS: NORMAL
BH CV LOWER VASCULAR LEFT MID FEMORAL PHASIC: NORMAL
BH CV LOWER VASCULAR LEFT MID FEMORAL SPONT: NORMAL
BH CV LOWER VASCULAR LEFT PERONEAL COMPRESS: NORMAL
BH CV LOWER VASCULAR LEFT POPLITEAL AUGMENT: NORMAL
BH CV LOWER VASCULAR LEFT POPLITEAL COMPETENT: NORMAL
BH CV LOWER VASCULAR LEFT POPLITEAL COMPRESS: NORMAL
BH CV LOWER VASCULAR LEFT POPLITEAL PHASIC: NORMAL
BH CV LOWER VASCULAR LEFT POPLITEAL SPONT: NORMAL
BH CV LOWER VASCULAR LEFT POPLITEAL THROMBUS: NORMAL
BH CV LOWER VASCULAR LEFT POSTERIOR TIBIAL COMPRESS: NORMAL
BH CV LOWER VASCULAR LEFT PROFUNDA FEMORAL COMPRESS: NORMAL
BH CV LOWER VASCULAR LEFT PROXIMAL FEMORAL COMPRESS: NORMAL
BH CV LOWER VASCULAR LEFT SAPHENOFEMORAL JUNCTION COMPRESS: NORMAL
BH CV LOWER VASCULAR RIGHT COMMON FEMORAL AUGMENT: NORMAL
BH CV LOWER VASCULAR RIGHT COMMON FEMORAL COMPETENT: NORMAL
BH CV LOWER VASCULAR RIGHT COMMON FEMORAL COMPRESS: NORMAL
BH CV LOWER VASCULAR RIGHT COMMON FEMORAL PHASIC: NORMAL
BH CV LOWER VASCULAR RIGHT COMMON FEMORAL SPONT: NORMAL
MAXIMAL PREDICTED HEART RATE: 156 BPM
STRESS TARGET HR: 133 BPM

## 2022-02-18 PROCEDURE — 93971 EXTREMITY STUDY: CPT

## 2022-03-04 ENCOUNTER — APPOINTMENT (OUTPATIENT)
Dept: LAB | Facility: HOSPITAL | Age: 67
End: 2022-03-04

## 2022-03-04 ENCOUNTER — OFFICE VISIT (OUTPATIENT)
Dept: ONCOLOGY | Facility: CLINIC | Age: 67
End: 2022-03-04

## 2022-03-04 VITALS
DIASTOLIC BLOOD PRESSURE: 77 MMHG | HEART RATE: 72 BPM | BODY MASS INDEX: 26.15 KG/M2 | WEIGHT: 153.2 LBS | OXYGEN SATURATION: 98 % | HEIGHT: 64 IN | RESPIRATION RATE: 18 BRPM | SYSTOLIC BLOOD PRESSURE: 156 MMHG | TEMPERATURE: 97.3 F

## 2022-03-04 DIAGNOSIS — I82.492 ACUTE DEEP VEIN THROMBOSIS (DVT) OF OTHER SPECIFIED VEIN OF LEFT LOWER EXTREMITY: ICD-10-CM

## 2022-03-04 PROCEDURE — 99214 OFFICE O/P EST MOD 30 MIN: CPT | Performed by: INTERNAL MEDICINE

## 2022-03-04 NOTE — PROGRESS NOTES
Subjective Discussed findings-questionably positive lupus anticoagulant, heterozygosity for factor V Leiden    REASON FOR FOLLOW-UP: Acute left lower extremity DVT    History of Present Illness   The patient is a 66-year-old female followed by primary care with hypercalcemia (hyperparathyroidism), glucose intolerance, asthma, hyperlipidemia, hypertension, and coronary artery disease as well as chronic back pain as well as coronary artery disease.  She was seen by cardiology 7/22/2021 having undergone stress testing and was felt to be stable.    She had follow-up with ENT 9/24 having undergone a hemithyroidectomy with removal of this  portion of the left thyroid lobe, parathyroidectomy for parathyroid adenoma.  The pathology is not immediately available for review.  The patient states she is also undergoing assessment for hearing loss by Dr. Jeevan Sharma with an MRI plan to rule out acoustic neuroma.  Additionally she has had previous colonoscopy in the last several years without abnormalities noted.         Following her surgery she had left calf pain for approximately week and was seen at [facility] 10/1/2021 with evidence of acute, occlusive DVT in the popliteal, posterior tibial and peroneal veins.  The patient states that this DVT development began 1 day post surgery.  She had been without any discomfort or swelling in the extremity prior to the procedure and compression devices were used perioperatively.  She was placed on Xarelto starter pack and has since transitioned to 20 mg daily.  She states, at the time of this consultation, that she no longer is swelling in the extremity and feels well.  There is no family history that she is aware of any thrombotic tendencies.    Please note that subsequent history taken from the patient indicate that her brother had a similar issue per thrombosis bringing up the clear possibility of an inherited tendency for thrombosis    The patient returns for testing  obtained 2021 with heterozygous findings for factor V Leiden, no prothrombin gene mutation, negative lupus anticoagulant testing, lupus anticoagulant found to be positive, beta-2 glycoprotein studies negative, Factor VIII of 116, normal homocysteine.  The patient indicates that she has been advised by her ENT physician that she remains hypercalcemic, unexpectedly so, may require more surgery,    The patient is next seen 3/4/2022 indicating that insurance company would not cover her factor V Leiden prothrombin gene mutation which by history were of concern (factor V Leiden found to be heterozygous positive).  Of greater concern would be a consistently positive lupus anticoagulant though the patient has clearly responded to her Xarelto therapy with subsequent Doppler examination 2021 demonstrating improvement in only chronic left lower thrombosis in the popliteal noted.  Again this is evidence the patient is being properly treated for her factor V Leiden mutation and subsequent thrombosis.      Past Medical History:   Diagnosis Date   • Anxiety    • Arthritis    • Asthma    • CAD (coronary artery disease)    • Disease of thyroid gland    • DVT, lower extremity, distal, acute, unspecified laterality (HCC)    • H/O bone density study DUE   • H/O complete eye exam DUE   • Hyperlipidemia    • Hypertension    • Seasonal allergies         Past Surgical History:   Procedure Laterality Date   •  SECTION  ,    • COLONOSCOPY     • MAMMO BILATERAL  DUE   • PAP SMEAR  DUE   • THYROID SURGERY     • TUBAL ABDOMINAL LIGATION          Current Outpatient Medications on File Prior to Visit   Medication Sig Dispense Refill   • albuterol (PROVENTIL HFA;VENTOLIN HFA) 108 (90 BASE) MCG/ACT inhaler Inhale 2 puffs Every 4 (Four) Hours As Needed for Wheezing.     • amLODIPine (NORVASC) 2.5 MG tablet Take 1 tablet by mouth Daily. 90 tablet 3   • azelastine (ASTELIN) 0.1 % nasal spray 2 sprays into each nostril 2  (Two) Times a Day. Use in each nostril as directed     • cetirizine (ZyrTEC) 10 MG tablet Take 10 mg by mouth daily.     • fenofibrate 160 MG tablet Take 1 tablet by mouth Daily. 90 tablet 3   • fluticasone-salmeterol (ADVAIR HFA) 115-21 MCG/ACT inhaler Inhale 2 puffs 2 (Two) Times a Day.     • HYDROcodone-acetaminophen (NORCO) 7.5-325 MG per tablet TAKE 1 TO 2 TABLETS BY MOUTH EVERY 4 TO 6 HOURS     • lisinopril-hydrochlorothiazide (PRINZIDE,ZESTORETIC) 10-12.5 MG per tablet Take 1 tablet by mouth Daily. 90 tablet 3   • montelukast (SINGULAIR) 10 MG tablet Take 10 mg by mouth every night.     • Multiple Minerals-Vitamins (Citracal Maximum Plus) tablet TAKE 1 TABLET BY MOUTH FIVE TIMES DAILY FOR 7 DAYS THEN DECREASE TO 1 TABLET THREE TIMES DAILY FOR 7 DAYS     • ondansetron ODT (ZOFRAN-ODT) 8 MG disintegrating tablet DISSOLVE 1 TABLET ON THE TONGUE EVERY 6 HOURS FOR 7 DAYS     • [DISCONTINUED] rivaroxaban (XARELTO) 20 MG tablet Take 1 tablet by mouth Daily. 30 tablet 5   • [DISCONTINUED] Rivaroxaban (XARELTO) tablet therapy pack starter pack Take as directed. Take 15mg twice daily for 2 days, followed by 20mg once daily with food..     • [DISCONTINUED] Xarelto Starter Pack tablet therapy pack starter pack Take one 15 mg tablet twice daily with food for 21 days.  Followed by one 20 mg tablet by mouth once daily with food.       No current facility-administered medications on file prior to visit.        ALLERGIES:  No Known Allergies     Social History     Socioeconomic History   • Marital status:      Spouse name: Marlon   • Number of children: 2   • Years of education: High school   Tobacco Use   • Smoking status: Never Smoker   • Smokeless tobacco: Never Used   • Tobacco comment: caffeine use 1-2 sodas daily   Substance and Sexual Activity   • Alcohol use: No   • Drug use: No   • Sexual activity: Defer        Family History   Problem Relation Age of Onset   • Heart disease Other    • Hypertension Other    •  "Stroke Other    • Stroke Mother    • Heart disease Brother    • Hypertension Brother    • Diabetes Brother    • Heart disease Brother         Review of Systems   Constitutional: Negative for activity change, fatigue, fever and unexpected weight change.   HENT: Positive for tinnitus.         See history of present illness   Eyes: Negative.    Respiratory: Positive for cough (Increased general secretions including mucus).    Cardiovascular: Negative.    Gastrointestinal: Negative.    Genitourinary: Negative.    Musculoskeletal: Negative.    Skin: Negative for color change.   Allergic/Immunologic: Negative.    Neurological: Negative.    Psychiatric/Behavioral: Negative.         Objective     Vitals:    03/04/22 1543   BP: 156/77   Pulse: 72   Resp: 18   Temp: 97.3 °F (36.3 °C)   TempSrc: Temporal   SpO2: 98%   Weight: 69.5 kg (153 lb 3.2 oz)   Height: 162.6 cm (64.02\")   PainSc:   6   PainLoc: Foot  Comment: left leg alot of cramping     Current Status 3/4/2022   ECOG score 0       Physical Exam  Constitutional:       Appearance: Normal appearance.   HENT:      Nose: Nose normal.      Mouth/Throat:      Mouth: Mucous membranes are moist.      Pharynx: Oropharynx is clear.   Eyes:      Extraocular Movements: Extraocular movements intact.      Conjunctiva/sclera: Conjunctivae normal.      Pupils: Pupils are equal, round, and reactive to light.   Cardiovascular:      Rate and Rhythm: Normal rate and regular rhythm.      Pulses: Normal pulses.      Heart sounds: Normal heart sounds.   Pulmonary:      Effort: Pulmonary effort is normal.      Breath sounds: Normal breath sounds.   Abdominal:      General: Bowel sounds are normal.      Palpations: Abdomen is soft.   Musculoskeletal:         General: No swelling or tenderness. Normal range of motion.      Cervical back: Normal range of motion and neck supple.   Skin:     General: Skin is warm.   Neurological:      General: No focal deficit present.      Mental Status: She is " alert and oriented to person, place, and time. Mental status is at baseline.           RECENT LABS:  Hematology WBC   Date Value Ref Range Status   10/29/2021 4.95 3.40 - 10.80 10*3/mm3 Final   06/02/2021 4.61 3.40 - 10.80 10*3/mm3 Final     RBC   Date Value Ref Range Status   10/29/2021 4.44 3.77 - 5.28 10*6/mm3 Final   06/02/2021 4.55 3.77 - 5.28 10*6/mm3 Final     Hemoglobin   Date Value Ref Range Status   10/29/2021 13.8 12.0 - 15.9 g/dL Final     Hematocrit   Date Value Ref Range Status   10/29/2021 41.8 34.0 - 46.6 % Final     Platelets   Date Value Ref Range Status   10/29/2021 151 140 - 450 10*3/mm3 Final          Assessment/Plan      66-year-old female followed with a medical history of asthma, hyperlipidemia, hypertension and potentially coronary artery disease with recent development of hypercalcemia secondary to hyperparathyroidism.  She underwent a hemithyroidectomy with removal of this portion of the left thyroid gland, parathyroidectomy for parathyroid adenoma.  As we review the pathology results she, additionally, had noted hearing loss and is to have an MRI to rule out acoustic neuroma.  Her other screening has been negative.  Following her surgery unexpectedly within 24 hours she developed a swollen left lower extremity and was found to have evidence of acute, occlusive DVT in the popliteal, posterior tibial and peroneal veins.  She was placed on and is responding to Xarelto anticoagulation.     We have discussed that there was some provocation for this, albeit very modest, and that 1 would want to assess her hypercoagulable status though do this at a point when she is off Xarelto since this would confuse some of the results.  After additional discussion we proceeded with a chest x-ray that was normal and laboratory testing with heterozygous findings for factor V Leiden, no prothrombin gene mutation, negative lupus anticoagulant testing, lupus anticoagulant found to be positive, beta-2 glycoprotein  studies negative, Factor VIII of 116, normal homocysteine.  The patient indicates that she has been advised by her ENT physician that she remains hypercalcemic, unexpectedly so, may require more surgery.      She did, incidentally, hold her Xarelto prior to retesting for lupus anticoagulant.  The patient is reassessed 11/23/2021 we have discussed rechecking her lupus anticoagulant 12 weeks to be certain of whether this continues to be elevated and potentially satisfies criteria for antiphospholipid antibody syndrome.  The patient currently continues to respond to Xarelto.     The patient continue Xarelto and we had plan to try to obtain repeat lupus anticoagulant but, fortunately, after patient requested a reassessment before these tests were done, find that the patient has responded with a clear improvement in her left lower extremity and only residual chronic popliteal thrombosis.  After further discussion plan:      Plan:  *Provide information so that her insurance company can make an informed decision as we appeal the need to have her so tested    *ENT follow-up continues and patient may need additional surgery    *Switch now to prophylactic Xarelto 10 mg p.o. daily E scribed to pharmacy    *Follow-up 2 to 3 months    *Patient agreeable this plan and follow-up

## 2022-03-15 ENCOUNTER — TELEPHONE (OUTPATIENT)
Dept: ONCOLOGY | Facility: CLINIC | Age: 67
End: 2022-03-15

## 2022-03-15 NOTE — TELEPHONE ENCOUNTER
Caller: EARLY    Relationship: [employer] [place]    Best call back number: CESAR CABELLO - [phone]    What is the best time to reach you: ANYTIME    Who are you requesting to speak with (clinical staff, provider,  specific staff member): DR BHAT OR NURSE    What was the call regarding: [facility] CALLED - STATED PT CALLED THEM STATING HER GENETIC TESTING WAS DENIED THROUGH [facility] BECAUSE IT WASN'T MEDICALLY NECESSARY. PLEASE CALL [facility] TO SPEAK WITH SOMEONE TO SEE IF DOCUMENTATION CAN BE SENT TO SHOW IT WAS MEDICALLY NECESSARY.     [facility] - [phone]    CALL PT WITH ANY QUESTIONS. EARLY WITH [facility] WAS JUST CALLING TO INFORM US.     Do you require a callback: YES

## 2022-03-15 NOTE — TELEPHONE ENCOUNTER
Per Brandy in billing, pt will need to seek her own appeal for this lab test. She will call pt and explain this process to her.

## 2022-05-06 ENCOUNTER — APPOINTMENT (OUTPATIENT)
Dept: LAB | Facility: HOSPITAL | Age: 67
End: 2022-05-06

## 2022-05-31 ENCOUNTER — OFFICE VISIT (OUTPATIENT)
Dept: FAMILY MEDICINE CLINIC | Facility: CLINIC | Age: 67
End: 2022-05-31

## 2022-05-31 VITALS
OXYGEN SATURATION: 99 % | SYSTOLIC BLOOD PRESSURE: 114 MMHG | DIASTOLIC BLOOD PRESSURE: 62 MMHG | RESPIRATION RATE: 16 BRPM | BODY MASS INDEX: 26.12 KG/M2 | HEIGHT: 64 IN | HEART RATE: 66 BPM | WEIGHT: 153 LBS | TEMPERATURE: 98.5 F

## 2022-05-31 DIAGNOSIS — R05.8 ACE-INHIBITOR COUGH: Primary | ICD-10-CM

## 2022-05-31 DIAGNOSIS — T46.4X5A ACE-INHIBITOR COUGH: Primary | ICD-10-CM

## 2022-05-31 DIAGNOSIS — I10 ESSENTIAL HYPERTENSION: ICD-10-CM

## 2022-05-31 PROCEDURE — 99213 OFFICE O/P EST LOW 20 MIN: CPT | Performed by: NURSE PRACTITIONER

## 2022-05-31 RX ORDER — LOSARTAN POTASSIUM AND HYDROCHLOROTHIAZIDE 12.5; 5 MG/1; MG/1
1 TABLET ORAL DAILY
Qty: 90 TABLET | Refills: 1 | Status: SHIPPED | OUTPATIENT
Start: 2022-05-31 | End: 2022-12-06 | Stop reason: SDUPTHER

## 2022-06-01 NOTE — PROGRESS NOTES
Subjective   Sera Salas is a 66 y.o. female.     History of Present Illness   Answers for HPI/ROS submitted by the patient on 5/31/2022  What is the primary reason for your visit?: Cough    Patient presents to office for chronic, mostly dry cough for a few months.  Denies increased shortness of breath or wheezing. States it is worse at night.  It waxes and wanes in severity.    The following portions of the patient's history were reviewed and updated as appropriate: allergies, current medications, past family history, past medical history, past social history, past surgical history and problem list.    Review of Systems   Constitutional: Negative for chills and fever.   HENT: Negative for ear pain, postnasal drip, rhinorrhea and sore throat.    Respiratory: Positive for cough. Negative for shortness of breath and wheezing.    Cardiovascular: Negative for chest pain.   Musculoskeletal: Negative for myalgias.   Skin: Negative for rash.   Neurological: Negative for headaches.       Objective   Physical Exam  Vitals and nursing note reviewed.   Constitutional:       Appearance: Normal appearance. She is well-developed.   Cardiovascular:      Rate and Rhythm: Normal rate and regular rhythm.   Pulmonary:      Effort: Pulmonary effort is normal.      Breath sounds: Normal breath sounds.   Neurological:      Mental Status: She is alert and oriented to person, place, and time.   Psychiatric:         Mood and Affect: Mood normal.         Behavior: Behavior normal.         Thought Content: Thought content normal.         Judgment: Judgment normal.         Assessment & Plan   Diagnoses and all orders for this visit:    1. ACE-inhibitor cough (Primary)    2. Essential hypertension  -     losartan-hydrochlorothiazide (HYZAAR) 50-12.5 MG per tablet; Take 1 tablet by mouth Daily.  Dispense: 90 tablet; Refill: 1

## 2022-06-24 ENCOUNTER — OFFICE VISIT (OUTPATIENT)
Dept: FAMILY MEDICINE CLINIC | Facility: CLINIC | Age: 67
End: 2022-06-24

## 2022-06-24 ENCOUNTER — TELEPHONE (OUTPATIENT)
Dept: ONCOLOGY | Facility: CLINIC | Age: 67
End: 2022-06-24

## 2022-06-24 VITALS
SYSTOLIC BLOOD PRESSURE: 122 MMHG | WEIGHT: 153 LBS | DIASTOLIC BLOOD PRESSURE: 80 MMHG | TEMPERATURE: 97.5 F | OXYGEN SATURATION: 99 % | HEART RATE: 90 BPM | HEIGHT: 64 IN | RESPIRATION RATE: 16 BRPM | BODY MASS INDEX: 26.12 KG/M2

## 2022-06-24 DIAGNOSIS — Z09 HOSPITAL DISCHARGE FOLLOW-UP: ICD-10-CM

## 2022-06-24 DIAGNOSIS — M54.6 ACUTE RIGHT-SIDED THORACIC BACK PAIN: Primary | ICD-10-CM

## 2022-06-24 DIAGNOSIS — I10 ESSENTIAL HYPERTENSION: ICD-10-CM

## 2022-06-24 DIAGNOSIS — E78.2 MIXED HYPERLIPIDEMIA: ICD-10-CM

## 2022-06-24 PROCEDURE — 72072 X-RAY EXAM THORAC SPINE 3VWS: CPT | Performed by: NURSE PRACTITIONER

## 2022-06-24 PROCEDURE — 99214 OFFICE O/P EST MOD 30 MIN: CPT | Performed by: NURSE PRACTITIONER

## 2022-06-24 RX ORDER — LISINOPRIL AND HYDROCHLOROTHIAZIDE 12.5; 1 MG/1; MG/1
1 TABLET ORAL DAILY
Qty: 90 TABLET | Refills: 3 | OUTPATIENT
Start: 2022-06-24

## 2022-06-24 RX ORDER — CYCLOBENZAPRINE HCL 5 MG
5 TABLET ORAL 3 TIMES DAILY PRN
Qty: 30 TABLET | Refills: 0 | Status: SHIPPED | OUTPATIENT
Start: 2022-06-24 | End: 2022-12-06

## 2022-06-24 RX ORDER — LIDOCAINE 50 MG/G
PATCH TOPICAL
COMMUNITY
Start: 2022-06-21 | End: 2023-02-07

## 2022-06-24 RX ORDER — PREDNISONE 20 MG/1
TABLET ORAL
Qty: 20 TABLET | Refills: 0 | Status: SHIPPED | OUTPATIENT
Start: 2022-06-24 | End: 2022-12-06

## 2022-06-24 RX ORDER — AMOXICILLIN AND CLAVULANATE POTASSIUM 875; 125 MG/1; MG/1
TABLET, FILM COATED ORAL
COMMUNITY
Start: 2022-06-21 | End: 2022-06-30

## 2022-06-24 NOTE — PROGRESS NOTES
Subjective   Sera Salas is a 66 y.o. female.     History of Present Illness   Sera Salas 66 y.o. female presents today for hosptial follow up.  she was treated Psychiatric for left chest wall pain, pleural effusion .  I reviewed all of the labs and diagnostic testing.  The patient's medications were not changed:  Current outpatient and discharge medications have been reconciled for the patient.  Reviewed by: JOSÉ Diop    she does not have a follow up appointment with a specialist:     Hospital note as follows:  CHIEF COMPLAINT: Chest pain    Context: Sera Salas is a 66 yr/o female with a history of hypertension, hyperlipidemia, DVT anticoagulated on Xarelto presenting to the ED for left-sided chest pain that began today, and states pain is a sharp pain that worsens with deep inspiration and radiates into her left shoulder. States she was recently admitted to the hospital for pneumonia, symptoms feel similar to when she had pneumonia. States her symptoms were improving when she left the hospital but then chest pain and shoulder pain began today. Denies associated fever, chills, worsening cough, worsening shortness of breath, abdominal pain, nausea, vomiting, urinary symptoms, leg swelling, syncope. Compliant with Xarelto.     PROGRESS  Disc hx, exam - ddx, plan with MD as disc above    Assessed pt. Pt updated on all blood work and CT scans including resolving pneumonia, small left sided pleural effusion, and mediastinal mass. Pt is getting worked up for mass. Pt will leonor ambulated in ED with pulse ox and if ok, pt will be d/c home with prompt follow up with PCP.    Pt ambulatory in ED without distress. Pt oxygen was 95% and above with ambulation. Pt given strict RTER precautions. Pt has a ride home form ED. Pt has inhalers at home and has follow up on Friday with PCP. Pt stable at discharge.     DIAGNOSIS  ICD-10-CM ICD-9-CM   1. Left-sided chest wall pain R07.89 786.52   2.  Cough R05.9 786.2   3. Pleural effusion on left J90 511.9           Hospital Follow Up Visit   Back Pain (L side from rib area up to shoulder.  Cant take a deep breath, move suddenly or be is certain positions without pain)    She reports improvement in shortness of breath but is more concerned with her left mid to upper back pain.  States it hurts to breathe deeply or move quickly.   Had left side pleural effusion noted on imaging in ER. Will recheck.  She was discharged on Augmentin.   The following portions of the patient's history were reviewed and updated as appropriate: allergies, current medications, past family history, past medical history, past social history, past surgical history and problem list.    Review of Systems   Constitutional: Negative for chills, fever and unexpected weight change.   Cardiovascular: Negative for palpitations.   Musculoskeletal: Positive for back pain.   Psychiatric/Behavioral: Negative for behavioral problems.       Objective   Physical Exam  Vitals and nursing note reviewed.   Constitutional:       Appearance: She is well-developed.   Cardiovascular:      Rate and Rhythm: Normal rate and regular rhythm.   Pulmonary:      Effort: Pulmonary effort is normal.      Breath sounds: Normal breath sounds.   Neurological:      Mental Status: She is alert and oriented to person, place, and time.   Psychiatric:         Mood and Affect: Mood normal.         Behavior: Behavior normal.         Thought Content: Thought content normal.         Judgment: Judgment normal.     2 view chest xray ordered and reviewed by me.  Pleural effusion noted in LLL.  No direct comparison on file.      Assessment & Plan   Diagnoses and all orders for this visit:    1. Acute right-sided thoracic back pain (Primary)  -     XR Spine Thoracic 3 View (In Office)  -     predniSONE (DELTASONE) 20 MG tablet; Take 3 tabs QDPC x 3 days then 2 tabs QDPC x 4 days then 1 tab QDPC x 3 days  Dispense: 20 tablet; Refill:  0  -     cyclobenzaprine (FLEXERIL) 5 MG tablet; Take 1 tablet by mouth 3 (Three) Times a Day As Needed for Muscle Spasms.  Dispense: 30 tablet; Refill: 0    2. Hospital discharge follow-up              Hospital records reviewed with pt confirming HPI.    Current outpatient and discharge medications have been reconciled for the patient.  Reviewed by: JOSÉ Diop

## 2022-06-24 NOTE — TELEPHONE ENCOUNTER
Caller: MELODIE     Relationship: KADEN Graff call back number: [phone]      What was the call regarding: FACILITY CALLED TO GET MORE CLINICAL INFORMATION ON THE GENETIC TESTING FOR THE APPEAL FOR THE CLAIM  PROGRESS NOTES, LAB REPORTS, SUPPORTING CLINICAL DOCUMENTATION     HAVE UNTIL 6-29-22 TO SUBMIT CLINICAL DOCUMENTATION'S       FAX # [phone]  COVER SHEET: ATTENTION APPEALS     Do you require a callback: YES, IF ANY QUESTIONS

## 2022-06-30 ENCOUNTER — OFFICE VISIT (OUTPATIENT)
Dept: FAMILY MEDICINE CLINIC | Facility: CLINIC | Age: 67
End: 2022-06-30

## 2022-06-30 VITALS
WEIGHT: 154 LBS | TEMPERATURE: 97.5 F | OXYGEN SATURATION: 97 % | SYSTOLIC BLOOD PRESSURE: 118 MMHG | BODY MASS INDEX: 26.29 KG/M2 | HEIGHT: 64 IN | DIASTOLIC BLOOD PRESSURE: 60 MMHG | HEART RATE: 77 BPM | RESPIRATION RATE: 16 BRPM

## 2022-06-30 DIAGNOSIS — M54.6 ACUTE RIGHT-SIDED THORACIC BACK PAIN: ICD-10-CM

## 2022-06-30 DIAGNOSIS — J90 PLEURAL EFFUSION: Primary | ICD-10-CM

## 2022-06-30 PROCEDURE — 99213 OFFICE O/P EST LOW 20 MIN: CPT | Performed by: NURSE PRACTITIONER

## 2022-06-30 PROCEDURE — 71046 X-RAY EXAM CHEST 2 VIEWS: CPT | Performed by: NURSE PRACTITIONER

## 2022-06-30 NOTE — PROGRESS NOTES
Subjective   Sera Salas is a 66 y.o. female.     History of Present Illness    Back Pain (Follow up on back pain, she is much better with very little pain)    Patient reports prednisone and muscle relaxer has helped significantly with pain.  She also feels she is breathing better.  Need to recheck pleural effusion on chest xray.   The following portions of the patient's history were reviewed and updated as appropriate: allergies, current medications, past family history, past medical history, past social history, past surgical history and problem list.    Review of Systems   Constitutional: Negative for unexpected weight change.   Respiratory: Negative for shortness of breath.    Cardiovascular: Negative for chest pain and palpitations.   Musculoskeletal: Positive for back pain.   Psychiatric/Behavioral: Negative for behavioral problems.       Objective   Physical Exam  Vitals and nursing note reviewed.   Constitutional:       Appearance: Normal appearance. She is well-developed.   Cardiovascular:      Rate and Rhythm: Normal rate.   Pulmonary:      Effort: Pulmonary effort is normal.   Neurological:      Mental Status: She is alert and oriented to person, place, and time.   Psychiatric:         Mood and Affect: Mood normal.         Behavior: Behavior normal.         Thought Content: Thought content normal.         Judgment: Judgment normal.     2 view chest xray ordered and reviewed by me.  Reduction in size of LLL effusion compared to thoracic xray from 6/24/22.    Assessment & Plan   Diagnoses and all orders for this visit:    1. Pleural effusion (Primary)  -     XR Chest PA & Lateral (In Office)    2. Acute right-sided thoracic back pain  Comments:  improved

## 2022-08-29 DIAGNOSIS — I82.492 ACUTE DEEP VEIN THROMBOSIS (DVT) OF OTHER SPECIFIED VEIN OF LEFT LOWER EXTREMITY: ICD-10-CM

## 2022-08-29 RX ORDER — RIVAROXABAN 10 MG/1
TABLET, FILM COATED ORAL
Qty: 90 TABLET | Refills: 1 | Status: SHIPPED | OUTPATIENT
Start: 2022-08-29 | End: 2022-12-06 | Stop reason: SDUPTHER

## 2022-09-30 ENCOUNTER — OFFICE VISIT (OUTPATIENT)
Dept: ORTHOPEDIC SURGERY | Facility: CLINIC | Age: 67
End: 2022-09-30

## 2022-09-30 VITALS — WEIGHT: 157.5 LBS | HEIGHT: 64 IN | TEMPERATURE: 96.6 F | BODY MASS INDEX: 26.89 KG/M2

## 2022-09-30 DIAGNOSIS — G89.29 CHRONIC RIGHT SHOULDER PAIN: ICD-10-CM

## 2022-09-30 DIAGNOSIS — M25.511 CHRONIC RIGHT SHOULDER PAIN: ICD-10-CM

## 2022-09-30 DIAGNOSIS — R52 PAIN: Primary | ICD-10-CM

## 2022-09-30 PROCEDURE — 73030 X-RAY EXAM OF SHOULDER: CPT | Performed by: ORTHOPAEDIC SURGERY

## 2022-09-30 PROCEDURE — 99203 OFFICE O/P NEW LOW 30 MIN: CPT | Performed by: ORTHOPAEDIC SURGERY

## 2022-09-30 NOTE — PROGRESS NOTES
Patient: Sera Salas    YOB: 1955    Medical Record Number: [id number]    Chief Complaints:  Right shoulder pain    History of Present Illness:     67 y.o. female patient who presents with a complaint of right shoulder pain.  She reports that the symptoms first started about 18 months ago.  She does not recall any specific injury, inciting event or factor.  This has been a persistent issue for her, slowly getting worse.  Her current pain is moderate, constant and aching.  The pain is worse with certain reaching and lifting movements as well as at night.  She was seeing another orthopedist and had an injection done in December 2021.  It did not help significantly.  She denies any alleviating factors despite having tried the injection, numerous over-the-counter anti-inflammatories and chiropractic treatment.  She is frustrated by her persistent symptoms.  She has been having some occasional pain radiating down towards her elbow but denies any shooting pain into the lower arm, distal weakness, numbness or paresthesias.    Allergies: No Known Allergies    Home Medications:    Current Outpatient Medications:   •  albuterol (PROVENTIL HFA;VENTOLIN HFA) 108 (90 BASE) MCG/ACT inhaler, Inhale 2 puffs Every 4 (Four) Hours As Needed for Wheezing., Disp: , Rfl:   •  amLODIPine (NORVASC) 2.5 MG tablet, Take 1 tablet by mouth Daily., Disp: 90 tablet, Rfl: 3  •  azelastine (ASTELIN) 0.1 % nasal spray, 2 sprays into the nostril(s) as directed by provider 2 (Two) Times a Day. Use in each nostril as directed, Disp: , Rfl:   •  cetirizine (ZyrTEC) 10 MG tablet, Take 10 mg by mouth daily., Disp: , Rfl:   •  fenofibrate 160 MG tablet, Take 1 tablet by mouth Daily., Disp: 90 tablet, Rfl: 3  •  fluticasone-salmeterol (ADVAIR HFA) 115-21 MCG/ACT inhaler, Inhale 2 puffs 2 (Two) Times a Day., Disp: , Rfl:   •  losartan-hydrochlorothiazide (HYZAAR) 50-12.5 MG per tablet, Take 1 tablet by mouth Daily., Disp: 90 tablet, Rfl:  1  •  montelukast (SINGULAIR) 10 MG tablet, Take 10 mg by mouth every night., Disp: , Rfl:   •  Xarelto 10 MG tablet, TAKE 1 TABLET BY MOUTH DAILY, Disp: 90 tablet, Rfl: 1  •  cyclobenzaprine (FLEXERIL) 5 MG tablet, Take 1 tablet by mouth 3 (Three) Times a Day As Needed for Muscle Spasms., Disp: 30 tablet, Rfl: 0  •  lidocaine (LIDODERM) 5 %, PLACE 1 PATCH ONTO THE SKIN DAILY. REMOVE AND DISCARD PATCH WITHIN 12 HOURS OR AS DIRECTED BY MD, Disp: , Rfl:   •  Multiple Minerals-Vitamins (Citracal Maximum Plus) tablet, TAKE 1 TABLET BY MOUTH FIVE TIMES DAILY FOR 7 DAYS THEN DECREASE TO 1 TABLET THREE TIMES DAILY FOR 7 DAYS, Disp: , Rfl:   •  predniSONE (DELTASONE) 20 MG tablet, Take 3 tabs QDPC x 3 days then 2 tabs QDPC x 4 days then 1 tab QDPC x 3 days, Disp: 20 tablet, Rfl: 0    Past Medical History:   Diagnosis Date   • Allergic    • Anxiety    • Arthritis    • Asthma    • CAD (coronary artery disease)    • Disease of thyroid gland    • DVT, lower extremity, distal, acute, unspecified laterality (HCC)    • H/O bone density study DUE   • H/O complete eye exam DUE   • HL (hearing loss) may 2021   • Hyperlipidemia    • Hypertension    • Seasonal allergies        Past Surgical History:   Procedure Laterality Date   •  SECTION  ,    • COLONOSCOPY     • MAMMO BILATERAL  DUE   • PAP SMEAR  DUE   • THYROID SURGERY     • TUBAL ABDOMINAL LIGATION         Social History     Occupational History   • Occupation:      Employer: [employer]   Tobacco Use   • Smoking status: Never Smoker   • Smokeless tobacco: Never Used   • Tobacco comment: caffeine use 1-2 sodas daily   Substance and Sexual Activity   • Alcohol use: No   • Drug use: No   • Sexual activity: Yes     Birth control/protection: None      Social History     Social History Narrative   • Not on file       Family History   Problem Relation Age of Onset   • Heart disease Other    • Hypertension Other    • Stroke Other    •  "Stroke Mother    • Heart disease Brother    • Hypertension Brother    • Diabetes Brother    • Heart disease Brother        Review of Systems:      Constitutional: Denies fever, shaking or chills   Eyes: Denies change in visual acuity   HEENT: Denies nasal congestion or sore throat   Respiratory: Denies cough or shortness of breath   Cardiovascular: Denies chest pain or edema  Endocrine: Denies tremors, palpitations, intolerance of heat or cold, polyuria, polydipsia.  GI: Denies abdominal pain, nausea, vomiting, bloody stools or diarrhea  : Denies frequency, urgency, incontinence, retention, or nocturia.  Musculoskeletal: Denies numbness, tingling or loss of motor function except as above  Integument: Denies rash, lesion or ulceration   Neurologic: Denies headache or focal weakness, deficits  Heme: Denies spontaneous or excessive bleeding, epistaxis, hematuria, melena, fatigue, enlarged or tender lymph nodes.      All other pertinent positives and negatives as noted above in HPI.    Physical Exam:   67 y.o. female    Vitals:    09/30/22 1555   Temp: 96.6 °F (35.9 °C)   Weight: 71.4 kg (157 lb 8 oz)   Height: 162.6 cm (64\")     General:  Patient is awake and alert.  Appears in no acute distress or discomfort.    Psych:  Affect and demeanor are appropriate.    Eyes:  Conjunctiva and sclera appear grossly normal.  Eyes track well and EOM seem to be intact.    Ears:  No gross abnormalities.  Hearing adequate for the exam.    Cardiovascular:  Regular rate and rhythm.    Lungs:  Good chest expansion.  Breathing unlabored.    Lymph:  No palpable adenopathy about neck or axilla.    Neck:  Supple.  Normal ROM.  Negative Spurling's for shoulder or arm pain.    Right upper extremity:  Skin is benign.  No gross abnormalities on inspection including any atrophy, swellings, or masses.  No palpable masses or adenopathy.  No focal areas of significant tenderness.  Full shoulder motion.  No evident instability or apprehension.  " Mildly positive Neer, Quiñones.  Weakness with forward elevation in the scapular plane.  Good strength with internal and external rotation.  Good strength in the deltoid, biceps, triceps, and .  Intact sensation throughout the arm.  Brisk cap refill.  Palpable radial pulse.  Good skin turgor.         Radiology:   AP, scapular Y, and axillary views of the right shoulder are ordered by myself and reviewed to evaluate the patient's complaint.  No comparison films are immediately available.  The x-rays show moderate AC arthritis.  There are no obvious acute abnormalities, lesions, masses, significant glenohumeral degenerative changes, or other concerning findings.  The acromiohumeral interval is normal.  Glenoid version appears normal as well.    Assessment/Plan:   Right shoulder pain, possible rotator cuff tear    Based on her exam, I cannot exclude a rotator cuff tear.  Given her failure of conservative treatment, I consider that further work-up is reasonable at this point.  We discussed the natural history of rotator cuff tears and risk of potential tear progression.  We thoroughly discussed options in detail including further conservative treatment versus further work-up with an MRI and potential surgical options. She has acknowledged understanding of this information.  She wants to pursue further work-up and potential surgical options.  I have recommended further work-up with an MRI.  I instructed her that I will call with the MRI results and we will come up with a plan pending review of that study.    Zachary William MD    09/30/2022    CC to Milena Stevenson APRN

## 2022-10-27 ENCOUNTER — HOSPITAL ENCOUNTER (OUTPATIENT)
Dept: GENERAL RADIOLOGY | Facility: HOSPITAL | Age: 67
Discharge: HOME | End: 2022-10-27

## 2022-10-27 ENCOUNTER — HOSPITAL ENCOUNTER (OUTPATIENT)
Dept: MRI IMAGING | Facility: HOSPITAL | Age: 67
Discharge: HOME | End: 2022-10-27

## 2022-10-27 DIAGNOSIS — M25.511 CHRONIC RIGHT SHOULDER PAIN: ICD-10-CM

## 2022-10-27 DIAGNOSIS — G89.29 CHRONIC RIGHT SHOULDER PAIN: ICD-10-CM

## 2022-10-27 PROCEDURE — 77002 NEEDLE LOCALIZATION BY XRAY: CPT

## 2022-10-27 PROCEDURE — 0 LIDOCAINE 1 % SOLUTION: Performed by: ORTHOPAEDIC SURGERY

## 2022-10-27 PROCEDURE — 25010000002 IOPAMIDOL 61 % SOLUTION: Performed by: ORTHOPAEDIC SURGERY

## 2022-10-27 PROCEDURE — 73222 MRI JOINT UPR EXTREM W/DYE: CPT

## 2022-10-27 RX ORDER — LIDOCAINE HYDROCHLORIDE 10 MG/ML
10 INJECTION, SOLUTION INFILTRATION; PERINEURAL
Status: COMPLETED | OUTPATIENT
Start: 2022-10-27 | End: 2022-10-27

## 2022-10-27 RX ADMIN — IOPAMIDOL 2 ML: 612 INJECTION, SOLUTION INTRAVENOUS at 13:10

## 2022-10-27 RX ADMIN — LIDOCAINE HYDROCHLORIDE 4 ML: 10 INJECTION, SOLUTION INFILTRATION; PERINEURAL at 13:10

## 2022-12-02 ENCOUNTER — TELEPHONE (OUTPATIENT)
Dept: ORTHOPEDIC SURGERY | Facility: CLINIC | Age: 67
End: 2022-12-02

## 2022-12-02 ENCOUNTER — PREP FOR SURGERY (OUTPATIENT)
Dept: OTHER | Facility: HOSPITAL | Age: 67
End: 2022-12-02

## 2022-12-02 DIAGNOSIS — M25.511 CHRONIC RIGHT SHOULDER PAIN: Primary | ICD-10-CM

## 2022-12-02 DIAGNOSIS — G89.29 CHRONIC RIGHT SHOULDER PAIN: Primary | ICD-10-CM

## 2022-12-02 RX ORDER — CEFAZOLIN SODIUM 2 G/100ML
2 INJECTION, SOLUTION INTRAVENOUS ONCE
Status: CANCELLED | OUTPATIENT
Start: 2023-02-21 | End: 2022-12-02

## 2022-12-02 NOTE — TELEPHONE ENCOUNTER
I spoke with her.  We discussed her MRI results.  We discussed the natural history of rotator cuff tears and her options.  I told her that postoperatively she would not be able to do any heavy lifting, pushing or pulling for probably close to 5 to 6 months.  She says that her job involves a lot of those activities.  Nonetheless, she still wants to pursue potentially getting the shoulder fixed.  She was really hoping to get this done before the end of the year.    We had a thorough discussion regarding the risks, benefits and alternatives to an arthroscopic rotator cuff repair.  I explained that surgical risks include infection, hematoma, anchor related complications including failure of fixation, loosening, cutout of the anchors, chondrolysis, rotator cuff re-tear necessitating revision surgery, persistent pain and/or loss of motion, iatrogenic nerve and/or blood vessel injury resulting in permanent weakness, numbness or dysfunction, RSD, DVT, PE, positioning related neuropraxia, and anesthesia related complications resulting in death.  We further discussed the possible need to address the biceps with a possible tenotomy versus tenodesis.  We discussed the fact that if the biceps demonstrates significant pathology in the groove then I would plan to perform a tenotomy which could result in maddy deformity or persistent pain, weakness or cramping.  We also discussed possible risks with a tenodesis as well including screw related complications including cutout, chondrolysis, failure of fixation with re-tear of the biceps, fracture and possible iatrogenic nerve injury.  She voiced understanding of the risks, benefits, and alternative forms of treatment that were discussed and consents to proceed.  I told her I am not sure if we can do this before the end of the year or not.  I will have my  call her.  In the meantime, I suggest she talk with her work to see if they might have any light duty options for  .

## 2022-12-02 NOTE — TELEPHONE ENCOUNTER
Caller: Sera Salas    Relationship: Self    Best call back number: [phone]    Caller requesting test results: PATIENT    What test was performed: MRI    When was the test performed: 10.27.2022    Where was the test performed: [facility]    Additional notes: DR SCHILLING'S LAST OFFICE NOTES STATE THAT HE WILL CALL PT TO DISCUSS RESULTS.

## 2022-12-06 ENCOUNTER — OFFICE VISIT (OUTPATIENT)
Dept: FAMILY MEDICINE CLINIC | Facility: CLINIC | Age: 67
End: 2022-12-06

## 2022-12-06 ENCOUNTER — TELEPHONE (OUTPATIENT)
Dept: ORTHOPEDIC SURGERY | Facility: CLINIC | Age: 67
End: 2022-12-06

## 2022-12-06 VITALS
HEIGHT: 64 IN | TEMPERATURE: 97.5 F | SYSTOLIC BLOOD PRESSURE: 104 MMHG | WEIGHT: 157 LBS | RESPIRATION RATE: 16 BRPM | DIASTOLIC BLOOD PRESSURE: 62 MMHG | HEART RATE: 64 BPM | BODY MASS INDEX: 26.8 KG/M2 | OXYGEN SATURATION: 98 %

## 2022-12-06 DIAGNOSIS — E78.2 MIXED HYPERLIPIDEMIA: ICD-10-CM

## 2022-12-06 DIAGNOSIS — I10 ESSENTIAL HYPERTENSION: ICD-10-CM

## 2022-12-06 DIAGNOSIS — M94.261 CHONDROMALACIA OF KNEE, RIGHT: Primary | ICD-10-CM

## 2022-12-06 DIAGNOSIS — I82.492 ACUTE DEEP VEIN THROMBOSIS (DVT) OF OTHER SPECIFIED VEIN OF LEFT LOWER EXTREMITY: ICD-10-CM

## 2022-12-06 PROBLEM — G89.29 CHRONIC RIGHT SHOULDER PAIN: Status: ACTIVE | Noted: 2022-12-06

## 2022-12-06 PROBLEM — M25.511 CHRONIC RIGHT SHOULDER PAIN: Status: ACTIVE | Noted: 2022-12-06

## 2022-12-06 PROCEDURE — 99214 OFFICE O/P EST MOD 30 MIN: CPT | Performed by: NURSE PRACTITIONER

## 2022-12-06 RX ORDER — PREDNISONE 20 MG/1
TABLET ORAL
Qty: 20 TABLET | Refills: 0 | Status: SHIPPED | OUTPATIENT
Start: 2022-12-06 | End: 2023-02-07

## 2022-12-06 RX ORDER — FENOFIBRATE 160 MG/1
160 TABLET ORAL DAILY
Qty: 90 TABLET | Refills: 3 | Status: SHIPPED | OUTPATIENT
Start: 2022-12-06

## 2022-12-06 RX ORDER — LOSARTAN POTASSIUM AND HYDROCHLOROTHIAZIDE 12.5; 5 MG/1; MG/1
1 TABLET ORAL DAILY
Qty: 90 TABLET | Refills: 3 | Status: SHIPPED | OUTPATIENT
Start: 2022-12-06

## 2022-12-06 RX ORDER — AMLODIPINE BESYLATE 2.5 MG/1
2.5 TABLET ORAL DAILY
Qty: 90 TABLET | Refills: 3 | Status: SHIPPED | OUTPATIENT
Start: 2022-12-06

## 2022-12-06 NOTE — TELEPHONE ENCOUNTER
Provider: DR SCHILLING    Caller: CESAR CABELLO    Relationship to Patient: SELF    Phone Number: [phone]    Reason for Call: PLEASE CALL PATIENT. SHE GOT A MY CHART MESSAGE THAT SAYS FOLLOW [facility], COMPLETE BY DEC 07, 2022. BUT WHEN SHE CLICKS ON IT, THERE IS NOTHING THERE?    ALSO PATIENT WASN'T SURE IF SHE NEEDED TO KEEP HER FOLLOW UP APPT JAN 20 WITH DR SCHILLING SINCE SHE WAS HAVING SURGERY.    PLEASE CALL PATIENT AND ADVISE. THANK  YOU

## 2022-12-07 NOTE — PROGRESS NOTES
Subjective   Sera Salas is a 67 y.o. female.     History of Present Illness    Joint Swelling (R knee swells daily, the more she is on it the bigger it gets.  Painful to squat.)   Hypertension   Hyperlipidemia   questions about xarelto  Answers for HPI/ROS submitted by the patient on 12/3/2022  Please describe your symptoms.: swelling in knee.  Hurts to bend down  Have you had these symptoms before?: No  How long have you been having these symptoms?: Greater than 2 weeks  What is the primary reason for your visit?: Other    Wants xarelto filled here.  BP is well controlled on current regimen.   The following portions of the patient's history were reviewed and updated as appropriate: allergies, current medications, past family history, past medical history, past social history, past surgical history and problem list.    Review of Systems   Constitutional: Negative for unexpected weight change.   Respiratory: Negative for shortness of breath.    Cardiovascular: Negative for chest pain and palpitations.   Musculoskeletal: Positive for arthralgias and joint swelling.   Psychiatric/Behavioral: Negative for behavioral problems.       Objective   Physical Exam  Vitals and nursing note reviewed.   Constitutional:       Appearance: Normal appearance. She is well-developed.   Neck:      Vascular: No carotid bruit.   Cardiovascular:      Rate and Rhythm: Normal rate and regular rhythm.   Pulmonary:      Effort: Pulmonary effort is normal.      Breath sounds: Normal breath sounds.   Musculoskeletal:      Right knee: Swelling present. No deformity. Decreased range of motion. Tenderness present.   Neurological:      Mental Status: She is alert and oriented to person, place, and time.   Psychiatric:         Mood and Affect: Mood normal.         Behavior: Behavior normal.         Thought Content: Thought content normal.         Judgment: Judgment normal.         Assessment & Plan   Diagnoses and all orders for this visit:    1.  Chondromalacia of knee, right (Primary)  -     predniSONE (DELTASONE) 20 MG tablet; Take 3 tabs QDPC x 3 days then 2 tabs QDPC x 4 days then 1 tab QDPC x 3 days  Dispense: 20 tablet; Refill: 0    2. Mixed hyperlipidemia  -     amLODIPine (NORVASC) 2.5 MG tablet; Take 1 tablet by mouth Daily.  Dispense: 90 tablet; Refill: 3  -     fenofibrate 160 MG tablet; Take 1 tablet by mouth Daily.  Dispense: 90 tablet; Refill: 3    3. Essential hypertension  -     amLODIPine (NORVASC) 2.5 MG tablet; Take 1 tablet by mouth Daily.  Dispense: 90 tablet; Refill: 3  -     fenofibrate 160 MG tablet; Take 1 tablet by mouth Daily.  Dispense: 90 tablet; Refill: 3  -     losartan-hydrochlorothiazide (HYZAAR) 50-12.5 MG per tablet; Take 1 tablet by mouth Daily.  Dispense: 90 tablet; Refill: 3    4. Acute deep vein thrombosis (DVT) of other specified vein of left lower extremity (HCC)  -     rivaroxaban (Xarelto) 10 MG tablet; Take 1 tablet by mouth Daily.  Dispense: 90 tablet; Refill: 3             She will f/u with her orthopedic specialist for knee pain if no improvement.

## 2022-12-18 NOTE — PROGRESS NOTES
Subjective Discussed findings-questionably positive lupus anticoagulant, heterozygosity for factor V Leiden    REASON FOR FOLLOW-UP: Acute left lower extremity DVT    History of Present Illness   The patient is a 67-year-old female followed by primary care with hypercalcemia (hyperparathyroidism), glucose intolerance, asthma, hyperlipidemia, hypertension, and coronary artery disease as well as chronic back pain as well as coronary artery disease.  She was seen by cardiology 7/22/2021 having undergone stress testing and was felt to be stable.    She had follow-up with ENT 9/24 having undergone a hemithyroidectomy with removal of this  portion of the left thyroid lobe, parathyroidectomy for parathyroid adenoma.  The pathology is not immediately available for review.  The patient states she is also undergoing assessment for hearing loss by Dr. Jeevan Sharma with an MRI plan to rule out acoustic neuroma.  Additionally she has had previous colonoscopy in the last several years without abnormalities noted.         Following her surgery she had left calf pain for approximately week and was seen at [facility] 10/1/2021 with evidence of acute, occlusive DVT in the popliteal, posterior tibial and peroneal veins.  The patient states that this DVT development began 1 day post surgery.  She had been without any discomfort or swelling in the extremity prior to the procedure and compression devices were used perioperatively.  She was placed on Xarelto starter pack and has since transitioned to 20 mg daily.  She states, at the time of this consultation, that she no longer is swelling in the extremity and feels well.  There is no family history that she is aware of any thrombotic tendencies.    Please note that subsequent history taken from the patient indicate that her brother had a similar issue per thrombosis bringing up the clear possibility of an inherited tendency for thrombosis    The patient returns for testing  obtained 11/16/2021 with heterozygous findings for factor V Leiden, no prothrombin gene mutation, negative lupus anticoagulant testing, lupus anticoagulant found to be positive, beta-2 glycoprotein studies negative, Factor VIII of 116, normal homocysteine.  The patient indicates that she has been advised by her ENT physician that she remains hypercalcemic, unexpectedly so, may require more surgery,    The patient is next seen 3/4/2022 indicating that insurance company would not cover her factor V Leiden prothrombin gene mutation which by history were of concern (factor V Leiden found to be heterozygous positive).  Of greater concern would be a consistently positive lupus anticoagulant though the patient has clearly responded to her Xarelto therapy with subsequent Doppler examination 2/18/2021 demonstrating improvement in only chronic left lower thrombosis in the popliteal noted.  Again this is evidence the patient is being properly treated for her factor V Leiden mutation and subsequent thrombosis.    The patient is next seen in office 12/19/2022 indicating that she is undergoing additional surgery via ENT for removal of tissue related to her goiter.  Unfortunately we are unable to review those results though a CTA of chest 6/21/2022 when she presented with left sided pleuritic chest pain reveals a large left paratracheal lesion that was unchanged from 6/9/2022 and was felt to be either metastatic lymphadenopathy or intrathoracic goiter.  The patient held her Xarelto dosing perioperatively and, fortunately, did not subsequently have any thrombotic complications.      Past Medical History:   Diagnosis Date   • Allergic    • Anxiety    • Arthritis    • Asthma    • CAD (coronary artery disease)    • Disease of thyroid gland    • DVT, lower extremity, distal, acute, unspecified laterality (HCC)    • H/O bone density study DUE   • H/O complete eye exam DUE   • HL (hearing loss) may 2021   • Hyperlipidemia    •  Hypertension    • Seasonal allergies    • Shoulder pain         Past Surgical History:   Procedure Laterality Date   •  SECTION  ,    • COLONOSCOPY     • MAMMO BILATERAL  DUE   • PAP SMEAR  DUE   • THYROID SURGERY     • TUBAL ABDOMINAL LIGATION          Current Outpatient Medications on File Prior to Visit   Medication Sig Dispense Refill   • albuterol (PROVENTIL HFA;VENTOLIN HFA) 108 (90 BASE) MCG/ACT inhaler Inhale 2 puffs Every 4 (Four) Hours As Needed for Wheezing.     • amLODIPine (NORVASC) 2.5 MG tablet Take 1 tablet by mouth Daily. 90 tablet 3   • azelastine (ASTELIN) 0.1 % nasal spray 2 sprays into the nostril(s) as directed by provider 2 (Two) Times a Day. Use in each nostril as directed     • cetirizine (ZyrTEC) 10 MG tablet Take 10 mg by mouth daily.     • fenofibrate 160 MG tablet Take 1 tablet by mouth Daily. 90 tablet 3   • fluticasone-salmeterol (ADVAIR HFA) 115-21 MCG/ACT inhaler Inhale 2 puffs 2 (Two) Times a Day.     • lidocaine (LIDODERM) 5 % PLACE 1 PATCH ONTO THE SKIN DAILY. REMOVE AND DISCARD PATCH WITHIN 12 HOURS OR AS DIRECTED BY MD     • losartan-hydrochlorothiazide (HYZAAR) 50-12.5 MG per tablet Take 1 tablet by mouth Daily. 90 tablet 3   • montelukast (SINGULAIR) 10 MG tablet Take 10 mg by mouth every night.     • Multiple Minerals-Vitamins (Citracal Maximum Plus) tablet TAKE 1 TABLET BY MOUTH FIVE TIMES DAILY FOR 7 DAYS THEN DECREASE TO 1 TABLET THREE TIMES DAILY FOR 7 DAYS     • predniSONE (DELTASONE) 20 MG tablet Take 3 tabs QDPC x 3 days then 2 tabs QDPC x 4 days then 1 tab QDPC x 3 days 20 tablet 0   • [DISCONTINUED] rivaroxaban (Xarelto) 10 MG tablet Take 1 tablet by mouth Daily. 90 tablet 3     No current facility-administered medications on file prior to visit.        ALLERGIES:  No Known Allergies     Social History     Socioeconomic History   • Marital status:      Spouse name: Marlon   • Number of children: 2   • Years of education: High school  "  Tobacco Use   • Smoking status: Never   • Smokeless tobacco: Never   • Tobacco comments:     caffeine use 1-2 sodas daily   Substance and Sexual Activity   • Alcohol use: No   • Drug use: No   • Sexual activity: Yes     Birth control/protection: None        Family History   Problem Relation Age of Onset   • Heart disease Other    • Hypertension Other    • Stroke Other    • Stroke Mother    • Arthritis Mother    • Heart disease Brother    • Hypertension Brother    • Diabetes Brother    • Heart disease Brother         Review of Systems   Constitutional: Negative for activity change, fatigue, fever and unexpected weight change.   HENT: Positive for tinnitus.         See history of present illness   Eyes: Negative.    Respiratory: Positive for cough (Increased general secretions including mucus).    Cardiovascular: Negative.    Gastrointestinal: Negative.    Genitourinary: Negative.    Musculoskeletal: Negative.    Skin: Negative for color change.   Allergic/Immunologic: Negative.    Neurological: Negative.    Psychiatric/Behavioral: Negative.         Objective     Vitals:    12/19/22 1532   BP: 159/87   Pulse: 82   Resp: 16   Temp: 96.9 °F (36.1 °C)   TempSrc: Temporal   SpO2: 99%   Weight: 69.5 kg (153 lb 3.2 oz)   Height: 162.6 cm (64.02\")   PainSc: 0-No pain     Current Status 12/19/2022   ECOG score 0       Physical Exam  Constitutional:       Appearance: Normal appearance.   HENT:      Nose: Nose normal.      Mouth/Throat:      Mouth: Mucous membranes are moist.      Pharynx: Oropharynx is clear.   Eyes:      Extraocular Movements: Extraocular movements intact.      Conjunctiva/sclera: Conjunctivae normal.      Pupils: Pupils are equal, round, and reactive to light.   Cardiovascular:      Rate and Rhythm: Normal rate and regular rhythm.      Pulses: Normal pulses.      Heart sounds: Normal heart sounds.      Comments: Anterior chest incision site well-healing  Pulmonary:      Effort: Pulmonary effort is normal. "      Breath sounds: Normal breath sounds.   Abdominal:      General: Bowel sounds are normal.      Palpations: Abdomen is soft.   Musculoskeletal:         General: No swelling or tenderness. Normal range of motion.      Cervical back: Normal range of motion and neck supple.   Skin:     General: Skin is warm.   Neurological:      General: No focal deficit present.      Mental Status: She is alert and oriented to person, place, and time. Mental status is at baseline.           RECENT LABS:  Hematology WBC   Date Value Ref Range Status   12/19/2022 9.15 3.40 - 10.80 10*3/mm3 Final   06/21/2022 10.22 4.5 - 11.0 10*3/uL Final     RBC   Date Value Ref Range Status   12/19/2022 4.65 3.77 - 5.28 10*6/mm3 Final   06/21/2022 3.80 (L) 4.0 - 5.2 10*6/uL Final     Hemoglobin   Date Value Ref Range Status   12/19/2022 14.6 12.0 - 15.9 g/dL Final   06/21/2022 11.9 (L) 12.0 - 16.0 g/dL Final     Hematocrit   Date Value Ref Range Status   12/19/2022 43.2 34.0 - 46.6 % Final   06/21/2022 36.9 36.0 - 46.0 % Final     Platelets   Date Value Ref Range Status   12/19/2022 189 140 - 450 10*3/mm3 Final   06/21/2022 323 140 - 440 10*3/uL Final          Assessment & Plan      67-year-old female followed with a medical history of asthma, hyperlipidemia, hypertension and potentially coronary artery disease with recent development of hypercalcemia secondary to hyperparathyroidism.  She underwent a hemithyroidectomy with removal of this portion of the left thyroid gland, parathyroidectomy for parathyroid adenoma.  As we review the pathology results she, additionally, had noted hearing loss and is to have an MRI to rule out acoustic neuroma.  Her other screening has been negative.  Following her surgery unexpectedly within 24 hours she developed a swollen left lower extremity and was found to have evidence of acute, occlusive DVT in the popliteal, posterior tibial and peroneal veins.  She was placed on and is responding to Xarelto  anticoagulation.     We have discussed that there was some provocation for this, albeit very modest, and that 1 would want to assess her hypercoagulable status though do this at a point when she is off Xarelto since this would confuse some of the results.  After additional discussion we proceeded with a chest x-ray that was normal and laboratory testing with heterozygous findings for factor V Leiden, no prothrombin gene mutation, negative lupus anticoagulant testing, lupus anticoagulant found to be positive, beta-2 glycoprotein studies negative, Factor VIII of 116, normal homocysteine.  The patient indicates that she has been advised by her ENT physician that she remains hypercalcemic, unexpectedly so, may require more surgery.      She did, incidentally, hold her Xarelto prior to retesting for lupus anticoagulant.  The patient is reassessed 11/23/2021 we have discussed rechecking her lupus anticoagulant 12 weeks to be certain of whether this continues to be elevated and potentially satisfies criteria for antiphospholipid antibody syndrome.  The patient currently continues to respond to Xarelto.     The patient continue Xarelto and we had plan to try to obtain repeat lupus anticoagulant but, fortunately, after patient requested a reassessment before these tests were done, find that the patient has responded with a clear improvement in her left lower extremity and only residual chronic popliteal thrombosis.  After further discussion we provide additional information so that her insurance company could make an informed decision.  Fortunately they did see that her testing was indeed necessary though we do not have an additional lupus anticoagulant exam.  Further we had her continue prophylactic Xarelto which she has continued since last visit in which was used successfully in and around additional surgery for an thoracic goiter.  She is seen 12/19/2022 and has not had any thrombotic complications and is urged to  continue prophylactic Xarelto.     Plan:  *Xarelto refilled at 90-day supply refill 3      *6 months follow-up MD, CBC, CMP      *Patient states that she might require additional right shoulder surgery as well as subsequent knee surgery and her anticoagulation will be adjusted as needed.

## 2022-12-19 ENCOUNTER — OFFICE VISIT (OUTPATIENT)
Dept: ONCOLOGY | Facility: CLINIC | Age: 67
End: 2022-12-19

## 2022-12-19 ENCOUNTER — LAB (OUTPATIENT)
Dept: LAB | Facility: HOSPITAL | Age: 67
End: 2022-12-19

## 2022-12-19 VITALS
RESPIRATION RATE: 16 BRPM | HEART RATE: 82 BPM | DIASTOLIC BLOOD PRESSURE: 87 MMHG | TEMPERATURE: 96.9 F | BODY MASS INDEX: 26.15 KG/M2 | HEIGHT: 64 IN | OXYGEN SATURATION: 99 % | WEIGHT: 153.2 LBS | SYSTOLIC BLOOD PRESSURE: 159 MMHG

## 2022-12-19 DIAGNOSIS — I82.492 ACUTE DEEP VEIN THROMBOSIS (DVT) OF OTHER SPECIFIED VEIN OF LEFT LOWER EXTREMITY: ICD-10-CM

## 2022-12-19 LAB
BASOPHILS # BLD AUTO: 0.09 10*3/MM3 (ref 0–0.2)
BASOPHILS NFR BLD AUTO: 1 % (ref 0–1.5)
DEPRECATED RDW RBC AUTO: 44.5 FL (ref 37–54)
EOSINOPHIL # BLD AUTO: 0.12 10*3/MM3 (ref 0–0.4)
EOSINOPHIL NFR BLD AUTO: 1.3 % (ref 0.3–6.2)
ERYTHROCYTE [DISTWIDTH] IN BLOOD BY AUTOMATED COUNT: 13.2 % (ref 12.3–15.4)
HCT VFR BLD AUTO: 43.2 % (ref 34–46.6)
HGB BLD-MCNC: 14.6 G/DL (ref 12–15.9)
IMM GRANULOCYTES # BLD AUTO: 0.1 10*3/MM3 (ref 0–0.05)
IMM GRANULOCYTES NFR BLD AUTO: 1.1 % (ref 0–0.5)
LYMPHOCYTES # BLD AUTO: 2.67 10*3/MM3 (ref 0.7–3.1)
LYMPHOCYTES NFR BLD AUTO: 29.2 % (ref 19.6–45.3)
MCH RBC QN AUTO: 31.4 PG (ref 26.6–33)
MCHC RBC AUTO-ENTMCNC: 33.8 G/DL (ref 31.5–35.7)
MCV RBC AUTO: 92.9 FL (ref 79–97)
MONOCYTES # BLD AUTO: 0.74 10*3/MM3 (ref 0.1–0.9)
MONOCYTES NFR BLD AUTO: 8.1 % (ref 5–12)
NEUTROPHILS NFR BLD AUTO: 5.43 10*3/MM3 (ref 1.7–7)
NEUTROPHILS NFR BLD AUTO: 59.3 % (ref 42.7–76)
NRBC BLD AUTO-RTO: 0 /100 WBC (ref 0–0.2)
PLATELET # BLD AUTO: 189 10*3/MM3 (ref 140–450)
PMV BLD AUTO: 11 FL (ref 6–12)
RBC # BLD AUTO: 4.65 10*6/MM3 (ref 3.77–5.28)
WBC NRBC COR # BLD: 9.15 10*3/MM3 (ref 3.4–10.8)

## 2022-12-19 PROCEDURE — 85025 COMPLETE CBC W/AUTO DIFF WBC: CPT

## 2022-12-19 PROCEDURE — 99214 OFFICE O/P EST MOD 30 MIN: CPT | Performed by: INTERNAL MEDICINE

## 2022-12-19 PROCEDURE — 36415 COLL VENOUS BLD VENIPUNCTURE: CPT

## 2023-01-20 ENCOUNTER — OFFICE VISIT (OUTPATIENT)
Dept: ORTHOPEDIC SURGERY | Facility: CLINIC | Age: 68
End: 2023-01-20
Payer: COMMERCIAL

## 2023-01-20 VITALS — TEMPERATURE: 97.1 F | HEIGHT: 64 IN | BODY MASS INDEX: 26.17 KG/M2 | WEIGHT: 153.3 LBS

## 2023-01-20 DIAGNOSIS — M25.511 CHRONIC RIGHT SHOULDER PAIN: Primary | ICD-10-CM

## 2023-01-20 DIAGNOSIS — G89.29 CHRONIC RIGHT SHOULDER PAIN: Primary | ICD-10-CM

## 2023-01-20 PROCEDURE — 73562 X-RAY EXAM OF KNEE 3: CPT | Performed by: ORTHOPAEDIC SURGERY

## 2023-01-20 PROCEDURE — 99214 OFFICE O/P EST MOD 30 MIN: CPT | Performed by: ORTHOPAEDIC SURGERY

## 2023-01-20 NOTE — PROGRESS NOTES
Medical Record Number: [id number]    Chief Complaints: Follow-up regarding right shoulder pain    History of Present Illness:     67 y.o. female patient who presents for follow-up of the right shoulder.  She reports persistent pain and symptoms.  She recently got an MRI and presents today to review that study and discuss further options.    Allergies: No Known Allergies    Home Medications:    Current Outpatient Medications:   •  albuterol (PROVENTIL HFA;VENTOLIN HFA) 108 (90 BASE) MCG/ACT inhaler, Inhale 2 puffs Every 4 (Four) Hours As Needed for Wheezing., Disp: , Rfl:   •  amLODIPine (NORVASC) 2.5 MG tablet, Take 1 tablet by mouth Daily., Disp: 90 tablet, Rfl: 3  •  azelastine (ASTELIN) 0.1 % nasal spray, 2 sprays into the nostril(s) as directed by provider 2 (Two) Times a Day. Use in each nostril as directed, Disp: , Rfl:   •  cetirizine (ZyrTEC) 10 MG tablet, Take 10 mg by mouth daily., Disp: , Rfl:   •  fenofibrate 160 MG tablet, Take 1 tablet by mouth Daily., Disp: 90 tablet, Rfl: 3  •  fluticasone-salmeterol (ADVAIR HFA) 115-21 MCG/ACT inhaler, Inhale 2 puffs 2 (Two) Times a Day., Disp: , Rfl:   •  losartan-hydrochlorothiazide (HYZAAR) 50-12.5 MG per tablet, Take 1 tablet by mouth Daily., Disp: 90 tablet, Rfl: 3  •  montelukast (SINGULAIR) 10 MG tablet, Take 10 mg by mouth every night., Disp: , Rfl:   •  rivaroxaban (Xarelto) 10 MG tablet, Take 1 tablet by mouth Daily., Disp: 90 tablet, Rfl: 3  •  lidocaine (LIDODERM) 5 %, PLACE 1 PATCH ONTO THE SKIN DAILY. REMOVE AND DISCARD PATCH WITHIN 12 HOURS OR AS DIRECTED BY MD, Disp: , Rfl:   •  Multiple Minerals-Vitamins (Citracal Maximum Plus) tablet, TAKE 1 TABLET BY MOUTH FIVE TIMES DAILY FOR 7 DAYS THEN DECREASE TO 1 TABLET THREE TIMES DAILY FOR 7 DAYS, Disp: , Rfl:   •  predniSONE (DELTASONE) 20 MG tablet, Take 3 tabs QDPC x 3 days then 2 tabs QDPC x 4 days then 1 tab QDPC x 3 days, Disp: 20 tablet, Rfl: 0    Past Medical History:   Diagnosis Date   •  Allergic    • Anxiety    • Arthritis    • Asthma    • CAD (coronary artery disease)    • Disease of thyroid gland    • DVT, lower extremity, distal, acute, unspecified laterality (HCC)    • H/O bone density study DUE   • H/O complete eye exam DUE   • HL (hearing loss) may 2021   • Hyperlipidemia    • Hypertension    • Seasonal allergies    • Shoulder pain        Past Surgical History:   Procedure Laterality Date   •  SECTION  ,    • COLONOSCOPY     • MAMMO BILATERAL  DUE   • PAP SMEAR  DUE   • THYROID SURGERY     • TUBAL ABDOMINAL LIGATION         Social History     Occupational History   • Occupation:      Employer: [employer]   Tobacco Use   • Smoking status: Never   • Smokeless tobacco: Never   • Tobacco comments:     caffeine use 1-2 sodas daily   Substance and Sexual Activity   • Alcohol use: No   • Drug use: No   • Sexual activity: Yes     Birth control/protection: None      Social History     Social History Narrative   • Not on file       Family History   Problem Relation Age of Onset   • Heart disease Other    • Hypertension Other    • Stroke Other    • Stroke Mother    • Arthritis Mother    • Heart disease Brother    • Hypertension Brother    • Diabetes Brother    • Heart disease Brother        Review of Systems:      Constitutional: Denies fever, shaking or chills   Eyes: Denies change in visual acuity   HEENT: Denies nasal congestion or sore throat   Respiratory: Denies cough or shortness of breath   Cardiovascular: Denies chest pain or edema  Endocrine: Denies tremors, palpitations, intolerance of heat or cold, polyuria, polydipsia.  GI: Denies abdominal pain, nausea, vomiting, bloody stools or diarrhea  : Denies frequency, urgency, incontinence, retention, or nocturia.  Musculoskeletal: Denies numbness, tingling or loss of motor function except as above  Integument: Denies rash, lesion or ulceration   Neurologic: Denies headache or focal weakness,  "deficits  Heme: Denies spontaneous or excessive bleeding, epistaxis, hematuria, melena, fatigue, enlarged or tender lymph nodes.      All other pertinent positives and negatives as noted above in HPI.    Physical Exam:   67 y.o. female  Vitals:    01/20/23 1133   Temp: 97.1 °F (36.2 °C)   Weight: 69.5 kg (153 lb 4.8 oz)   Height: 162.6 cm (64\")     General:  Patient is awake and alert.  Appears in no acute distress or discomfort.    Psych:  Affect and demeanor are appropriate.    Cardiovascular:  Regular rate and rhythm.    Lungs:  Good chest expansion.  Breathing unlabored.    Extremities: Right shoulder is examined.  Skin is benign.  Mild anterior tenderness without effusion.  Full shoulder motion.  Positive Neer and Quiñones.  Positive Pettigrew's.  5- out of 5 strength with forward elevation in the scapular plane.  Normal motor and sensory function in the lower arm and hand.  Palpable radial pulse.  Brisk capillary refill.    Imaging: MRI of the right shoulder is reviewed along with the associated report.  Findings are listed below:    IMPRESSION:  1. Partial-thickness bursal surface supraspinatus tendon insertional  tear measuring 1.6 cm is near full-thickness. No full-thickness rotator  cuff tear.  2. Mild AC joint arthritis with prominent acromial spur. Edema  subacromial/subdeltoid bursa.  3. Small degenerative tear extending along the base of the superior  labrum.    Assessment/Plan:  Right rotator cuff tear    We discussed the natural history of this condition and all available treatment options, both surgical and nonsurgical.  She acknowledged understanding of this information.  All of her questions were answered in detail.  She elected for operative treatment.  We had a thorough discussion regarding the risks, benefits and alternatives to an arthroscopic rotator cuff repair.  I explained that surgical risks include infection, hematoma, anchor related complications including failure of fixation, loosening, " cutout of the anchors, chondrolysis, rotator cuff re-tear necessitating revision surgery, persistent pain and/or loss of motion, iatrogenic nerve and/or blood vessel injury resulting in permanent weakness, numbness or dysfunction, RSD, DVT, PE, positioning related neuropraxia, and anesthesia related complications resulting in death.  She has a history of DVT but has not been taking her Xarelto ever since a recent thyroid surgery.  I told her that she will need to resume the Xarelto after the rotator cuff surgery.  I want her to start the day after surgery.  She acknowledged understanding.  We further discussed the possible need to address the biceps at the time of her rotator cuff surgery.  This would be with either a tenotomy or tenodesis.  I explained this would depend on whether there is significant pathology in the groove then I would plan to perform a tenotomy which could result in maddy deformity or persistent pain, weakness or cramping.  We also discussed possible risks with a tenodesis as well including screw related complications including cutout, chondrolysis, failure of fixation with re-tear of the biceps, fracture and possible iatrogenic nerve injury.  She voiced understanding of the risks, benefits, and alternative forms of treatment that were discussed and consents to proceed.  Of note, as she was leaving she mentions that she has been having right knee pain.  She requested an x-ray.  AP, merchant's and lateral views of the right knee were ordered and reviewed.  No comparison films are available.  The x-rays show medial joint space narrowing and early osteophyte formation consistent with mild osteoarthritis.  We talked about the natural history of arthritis and her options.  She says she is interested in pursuing an injection.  I told her that we can consider doing that at the time of her rotator cuff surgery.     Zachary William MD    01/20/2023

## 2023-02-07 ENCOUNTER — PRE-ADMISSION TESTING (OUTPATIENT)
Dept: PREADMISSION TESTING | Facility: HOSPITAL | Age: 68
End: 2023-02-07
Payer: COMMERCIAL

## 2023-02-07 VITALS
SYSTOLIC BLOOD PRESSURE: 119 MMHG | WEIGHT: 155 LBS | RESPIRATION RATE: 20 BRPM | HEART RATE: 72 BPM | DIASTOLIC BLOOD PRESSURE: 68 MMHG | BODY MASS INDEX: 26.46 KG/M2 | OXYGEN SATURATION: 99 % | HEIGHT: 64 IN | TEMPERATURE: 97.7 F

## 2023-02-07 LAB
ANION GAP SERPL CALCULATED.3IONS-SCNC: 8 MMOL/L (ref 5–15)
BUN SERPL-MCNC: 23 MG/DL (ref 8–23)
BUN/CREAT SERPL: 28 (ref 7–25)
CALCIUM SPEC-SCNC: 10.3 MG/DL (ref 8.6–10.5)
CHLORIDE SERPL-SCNC: 106 MMOL/L (ref 98–107)
CO2 SERPL-SCNC: 26 MMOL/L (ref 22–29)
CREAT SERPL-MCNC: 0.82 MG/DL (ref 0.57–1)
DEPRECATED RDW RBC AUTO: 45.4 FL (ref 37–54)
EGFRCR SERPLBLD CKD-EPI 2021: 79.5 ML/MIN/1.73
ERYTHROCYTE [DISTWIDTH] IN BLOOD BY AUTOMATED COUNT: 13 % (ref 12.3–15.4)
GLUCOSE SERPL-MCNC: 98 MG/DL (ref 65–99)
HCT VFR BLD AUTO: 38.1 % (ref 34–46.6)
HGB BLD-MCNC: 12.7 G/DL (ref 12–15.9)
MCH RBC QN AUTO: 31.7 PG (ref 26.6–33)
MCHC RBC AUTO-ENTMCNC: 33.3 G/DL (ref 31.5–35.7)
MCV RBC AUTO: 95 FL (ref 79–97)
PLATELET # BLD AUTO: 172 10*3/MM3 (ref 140–450)
PMV BLD AUTO: 10.8 FL (ref 6–12)
POTASSIUM SERPL-SCNC: 3.8 MMOL/L (ref 3.5–5.2)
QT INTERVAL: 391 MS
RBC # BLD AUTO: 4.01 10*6/MM3 (ref 3.77–5.28)
SODIUM SERPL-SCNC: 140 MMOL/L (ref 136–145)
WBC NRBC COR # BLD: 5.41 10*3/MM3 (ref 3.4–10.8)

## 2023-02-07 PROCEDURE — 85027 COMPLETE CBC AUTOMATED: CPT

## 2023-02-07 PROCEDURE — 36415 COLL VENOUS BLD VENIPUNCTURE: CPT

## 2023-02-07 PROCEDURE — 93010 ELECTROCARDIOGRAM REPORT: CPT | Performed by: STUDENT IN AN ORGANIZED HEALTH CARE EDUCATION/TRAINING PROGRAM

## 2023-02-07 PROCEDURE — 93005 ELECTROCARDIOGRAM TRACING: CPT

## 2023-02-07 PROCEDURE — 80048 BASIC METABOLIC PNL TOTAL CA: CPT

## 2023-02-07 RX ORDER — ACETAMINOPHEN 500 MG
500-1000 TABLET ORAL EVERY 6 HOURS PRN
COMMUNITY
End: 2023-02-21 | Stop reason: HOSPADM

## 2023-02-07 NOTE — DISCHARGE INSTRUCTIONS
Addended by: BERNHEIM, BRUCE S on: 8/17/2019 11:46 AM     Modules accepted: Orders     Take the following medications the morning of surgery:    Amlodipine   cetrizine   fenofibrate   singulair                                use advair       If you are on prescription narcotic pain medication to control your pain you may also take that medication the morning of surgery.    General Instructions:  Do not eat solid food after midnight the night before surgery.  You may drink clear liquids day of surgery but must stop at least one hour before your hospital arrival time.  It is beneficial for you to have a clear drink that contains carbohydrates the day of surgery.  We suggest a 12 to 20 ounce bottle of Gatorade or Powerade for non-diabetic patients or a 12 to 20 ounce bottle of G2 or Powerade Zero for diabetic patients. (Pediatric patients, are not advised to drink a 12 to 20 ounce carbohydrate drink)    Clear liquids are liquids you can see through.  Nothing red in color.     Plain water                               Sports drinks  Sodas                                   Gelatin (Jell-O)  Fruit juices without pulp such as white grape juice and apple juice  Popsicles that contain no fruit or yogurt  Tea or coffee (no cream or milk added)  Gatorade / Powerade  G2 / Powerade Zero    Infants may have breast milk up to four hours before surgery.  Infants drinking formula may drink formula up to six hours before surgery.   Patients who avoid smoking, chewing tobacco and alcohol for 4 weeks prior to surgery have a reduced risk of post-operative complications.  Quit smoking as many days before surgery as you can.  Do not smoke, use chewing tobacco or drink alcohol the day of surgery.   If applicable bring your C-PAP/ BI-PAP machine.  Bring any papers given to you in the doctor’s office.  Wear clean comfortable clothes.  Do not wear contact lenses, false eyelashes or make-up.  Bring a case for your glasses.   Bring crutches or walker if applicable.  Remove all piercings.  Leave jewelry and any other valuables at  home.  Hair extensions with metal clips must be removed prior to surgery.  The Pre-Admission Testing nurse will instruct you to bring medications if unable to obtain an accurate list in Pre-Admission Testing.        If you were given a blood bank ID arm band remember to bring it with you the day of surgery.    Day of surgery:2/21/2023   Hospital to call with time of arrival  Your arrival time is approximately two hours before your scheduled surgery time.  Upon arrival, a Pre-op nurse and Anesthesiologist will review your health history, obtain vital signs, and answer questions you may have.  The only belongings needed at this time will be a list of your home medications and if applicable your C-PAP/BI-PAP machine.  A Pre-op nurse will start an IV and you may receive medication in preparation for surgery, including something to help you relax.     Please be aware that surgery does come with discomfort.  We want to make every effort to control your discomfort so please discuss any uncontrolled symptoms with your nurse.   Your doctor will most likely have prescribed pain medications.      If you are going home after surgery you will receive individualized written care instructions before being discharged.  A responsible adult must drive you to and from the hospital on the day of your surgery and stay with you for 24 hours.  Discharge prescriptions can be filled by the hospital pharmacy during regular pharmacy hours.  If you are having surgery late in the day/evening your prescription may be e-prescribed to your pharmacy.  Please verify your pharmacy hours or chose a 24 hour pharmacy to avoid not having access to your prescription because your pharmacy has closed for the day.    If you are staying overnight following surgery, you will be transported to your hospital room following the recovery period.  [facility] has all private rooms.    If you have any questions please call Pre-Admission Testing at  [phone].  Deductibles and co-payments are collected on the day of service. Please be prepared to pay the required co-pay, deductible or deposit on the day of service as defined by your plan.    Call your surgeon immediately if you experience any of the following symptoms:  Sore Throat  Shortness of Breath or difficulty breathing  Cough  Chills  Body soreness or muscle pain  Headache  Fever  New loss of taste or smell  Do not arrive for your surgery ill.  Your procedure will need to be rescheduled to another time.  You will need to call your physician before the day of surgery to avoid any unnecessary exposure to hospital staff as well as other patients.        PREVENTING INFECTION IN SHOULDER SURGICAL SITES     C. acnes is a bacteria that lives deep within follicles and pores of the skin. It is found in large numbers on the skin of the face, axilla (armpit), chest and back and is the primary bacteria to cause a surgical site infection after shoulder surgery.      Use of a Benzoyl Peroxide solution prior to shoulder surgery decreases C. acnes and reduces post-op infections.   Your surgeon has ordered 5% Benzoyl Peroxide wash to be used three times prior to your surgery.     Please read the following instructions carefully and bring this form with you the day of surgery.     General bathing instructions starting two days before your surgery:    Shower using a fresh bar of anti-bacterial soap (such as Dial) and clean washcloth.  Pay special attention to the neck, shoulder and armpit area.   Wash your hair as usual with your regular shampoo.   Rinse hair and body thoroughly with warm water (not hot water) to remove shampoo and residue.   Dry with a clean towel.              Sleep in a clean bed with clean clothing.  Do not allow pets to sleep with you.     For 2 days before surgery, avoid shaving with a razor because the razor can irritate skin and make it easier to develop an infection.    Any areas of open skin  can increase the risk of a post-operative wound infection by allowing bacteria to enter and travel throughout the body.  Notify your surgeon if you have any skin wounds / rashes even if it is not near the expected surgical site.  The area will need assessed to determine if surgery should be delayed until it is healed.      First application of 5% Benzoyl Peroxide Wash two nights before surgery:                                                                Wash neck, shoulder (front, back, side) and armpit   with warm water, rinse and dry - see picture.  Gently wash the same areas with the Benzoyl Peroxide   cleanser going away from the neck for 10-20 seconds.   Work into a full lather and leave on the skin for   2 minutes for greatest effect.  Rinse thoroughly with warm water, not hot water.                     Pat dry with a clean towel.                                                            Wash your hands thoroughly.  Do not apply lotion, powder, perfume or deodorant.   Put on clean clothes.    Second application the night before surgery:  Repeat the above steps.        Third application morning of surgery:  Repeat the above steps.    Due to shoulder pain or decreased range of motion of your shoulder and arm, you may need assistance washing under the arm or the back portion of your shoulder.     Avoid further washing the areas of the skin treated with Benzoyl Peroxide for at least 1 hour.    For your convenience, you may purchase Benzoyl Peroxide at [facility] retail pharmacy.     Warning:  Let your physician know if you are allergic to Benzoyl Peroxide or have very sensitive skin and cannot use it.   Stop using and contact your surgeon if you experience any excessive scaling, itching, swelling, skin irritation or other signs of a reaction.  Keep out of eyes, ears, nose and mouth.  Do not apply to sunburned, irritated or broken area on the skin.  Avoid unwanted problems with bleaching effect by following  these tips:  Wash hands after each use.  Avoid contact with hair, clothing, furnishings or carpeting.  Wear clean, old t-shirt or clothing to bed.   Use clean, old white pillow cases and sheets to avoid discoloring your bed linens.                                                                                                                                                                                                                                                                                   Please complete the checklist below, bring it with you to the hospital                               the day of surgery and give to the Pre-op Nurse     Preoperative Skin Prep Checklist        Patient Name Label             Enter dates and ?  boxes to indicate completed    Surgery Date: _______________ Regular Shower  Benzoyl Peroxide Wash   First Application:  2 days before_______________  (Stop shaving all body parts)           Morning or Evening                        Evening    Second Application:  1 day  before________________        Morning or Evening                          Evening   Third Application:  Day of Surgery______________                           Morning                   Morning

## 2023-02-10 ENCOUNTER — TELEPHONE (OUTPATIENT)
Dept: ORTHOPEDIC SURGERY | Facility: CLINIC | Age: 68
End: 2023-02-10

## 2023-02-10 NOTE — TELEPHONE ENCOUNTER
Caller: Sera Salas    Relationship: Self    Best call back number:     What form or medical record are you requesting: SHORT TERM DISABILITY    Who is requesting this form or medical record from you: EMPLOYER    How would you like to receive the form or medical records (pick-up, mail, fax): FAX  If fax, what is the fax number: NUMBER ON THE FORM     Timeframe paperwork needed: ASAP    Additional notes: PATIENT CAN ACCESS DOCUMENT UPLOAD AND DOESN'T SEE THAT WE HAVE SUBMITTED OR SENT IN ANY PAPERWORK. PLEASE CALL PATIENT WHEN THIS IS COMPLETE

## 2023-02-13 NOTE — TELEPHONE ENCOUNTER
Left voicemail to patient advising she can find form under 2-6-23 [place].  Confirmation is attached as well.

## 2023-02-21 ENCOUNTER — HOSPITAL ENCOUNTER (OUTPATIENT)
Facility: HOSPITAL | Age: 68
Setting detail: HOSPITAL OUTPATIENT SURGERY
Discharge: HOME | End: 2023-02-21
Attending: ORTHOPAEDIC SURGERY | Admitting: ORTHOPAEDIC SURGERY
Payer: COMMERCIAL

## 2023-02-21 ENCOUNTER — ANESTHESIA EVENT (OUTPATIENT)
Dept: PERIOP | Facility: HOSPITAL | Age: 68
End: 2023-02-21
Payer: COMMERCIAL

## 2023-02-21 ENCOUNTER — ANESTHESIA (OUTPATIENT)
Dept: PERIOP | Facility: HOSPITAL | Age: 68
End: 2023-02-21
Payer: COMMERCIAL

## 2023-02-21 VITALS
DIASTOLIC BLOOD PRESSURE: 71 MMHG | TEMPERATURE: 97.4 F | SYSTOLIC BLOOD PRESSURE: 122 MMHG | RESPIRATION RATE: 18 BRPM | OXYGEN SATURATION: 99 % | HEART RATE: 55 BPM

## 2023-02-21 DIAGNOSIS — G89.29 CHRONIC RIGHT SHOULDER PAIN: ICD-10-CM

## 2023-02-21 DIAGNOSIS — M25.511 CHRONIC RIGHT SHOULDER PAIN: ICD-10-CM

## 2023-02-21 PROCEDURE — 29826 SHO ARTHRS SRG DECOMPRESSION: CPT | Performed by: ORTHOPAEDIC SURGERY

## 2023-02-21 PROCEDURE — 25010000002 FENTANYL CITRATE (PF) 50 MCG/ML SOLUTION: Performed by: ANESTHESIOLOGY

## 2023-02-21 PROCEDURE — 25010000002 ONDANSETRON PER 1 MG: Performed by: NURSE ANESTHETIST, CERTIFIED REGISTERED

## 2023-02-21 PROCEDURE — 25010000002 FENTANYL CITRATE (PF) 100 MCG/2ML SOLUTION: Performed by: NURSE ANESTHETIST, CERTIFIED REGISTERED

## 2023-02-21 PROCEDURE — 25010000002 EPINEPHRINE PER 0.1 MG: Performed by: ORTHOPAEDIC SURGERY

## 2023-02-21 PROCEDURE — 25010000002 MIDAZOLAM PER 1 MG: Performed by: ANESTHESIOLOGY

## 2023-02-21 PROCEDURE — 29828 SHO ARTHRS SRG BICP TENODSIS: CPT | Performed by: ORTHOPAEDIC SURGERY

## 2023-02-21 PROCEDURE — 25010000002 DEXAMETHASONE SODIUM PHOSPHATE 20 MG/5ML SOLUTION: Performed by: NURSE ANESTHETIST, CERTIFIED REGISTERED

## 2023-02-21 PROCEDURE — 0 BUPIVACAINE LIPOSOME 1.3 % SUSPENSION: Performed by: ANESTHESIOLOGY

## 2023-02-21 PROCEDURE — 29827 SHO ARTHRS SRG RT8TR CUF RPR: CPT | Performed by: ORTHOPAEDIC SURGERY

## 2023-02-21 PROCEDURE — 25010000002 CEFAZOLIN IN DEXTROSE 2-4 GM/100ML-% SOLUTION: Performed by: ORTHOPAEDIC SURGERY

## 2023-02-21 PROCEDURE — 25010000002 PROPOFOL 10 MG/ML EMULSION: Performed by: NURSE ANESTHETIST, CERTIFIED REGISTERED

## 2023-02-21 PROCEDURE — C9290 INJ, BUPIVACAINE LIPOSOME: HCPCS | Performed by: ANESTHESIOLOGY

## 2023-02-21 PROCEDURE — C1713 ANCHOR/SCREW BN/BN,TIS/BN: HCPCS | Performed by: ORTHOPAEDIC SURGERY

## 2023-02-21 PROCEDURE — 20610 DRAIN/INJ JOINT/BURSA W/O US: CPT | Performed by: ORTHOPAEDIC SURGERY

## 2023-02-21 PROCEDURE — 25010000002 NEOSTIGMINE 5 MG/10ML SOLUTION: Performed by: NURSE ANESTHETIST, CERTIFIED REGISTERED

## 2023-02-21 DEVICE — SUT FIBERLINK W/SUT TP 1.3MM WHT/BLU: Type: IMPLANTABLE DEVICE | Site: SHOULDER | Status: FUNCTIONAL

## 2023-02-21 DEVICE — SUT/ANCH BIOCOMP SWIVELOCK/C 4.75X19.1MM: Type: IMPLANTABLE DEVICE | Site: SHOULDER | Status: FUNCTIONAL

## 2023-02-21 DEVICE — SUT/ANCH FIBERTAK RC W/3 1.3MM SUT TP: Type: IMPLANTABLE DEVICE | Site: SHOULDER | Status: FUNCTIONAL

## 2023-02-21 RX ORDER — ONDANSETRON 2 MG/ML
INJECTION INTRAMUSCULAR; INTRAVENOUS AS NEEDED
Status: DISCONTINUED | OUTPATIENT
Start: 2023-02-21 | End: 2023-02-21 | Stop reason: SURG

## 2023-02-21 RX ORDER — HYDROMORPHONE HYDROCHLORIDE 1 MG/ML
0.25 INJECTION, SOLUTION INTRAMUSCULAR; INTRAVENOUS; SUBCUTANEOUS
Status: DISCONTINUED | OUTPATIENT
Start: 2023-02-21 | End: 2023-02-21 | Stop reason: HOSPADM

## 2023-02-21 RX ORDER — FENTANYL CITRATE 50 UG/ML
INJECTION, SOLUTION INTRAMUSCULAR; INTRAVENOUS AS NEEDED
Status: DISCONTINUED | OUTPATIENT
Start: 2023-02-21 | End: 2023-02-21 | Stop reason: SURG

## 2023-02-21 RX ORDER — PHENYLEPHRINE HCL IN 0.9% NACL 1 MG/10 ML
SYRINGE (ML) INTRAVENOUS AS NEEDED
Status: DISCONTINUED | OUTPATIENT
Start: 2023-02-21 | End: 2023-02-21 | Stop reason: SURG

## 2023-02-21 RX ORDER — NEOSTIGMINE METHYLSULFATE 0.5 MG/ML
INJECTION, SOLUTION INTRAVENOUS AS NEEDED
Status: DISCONTINUED | OUTPATIENT
Start: 2023-02-21 | End: 2023-02-21 | Stop reason: SURG

## 2023-02-21 RX ORDER — MIDAZOLAM HYDROCHLORIDE 1 MG/ML
0.5 INJECTION INTRAMUSCULAR; INTRAVENOUS
Status: DISCONTINUED | OUTPATIENT
Start: 2023-02-21 | End: 2023-02-21 | Stop reason: HOSPADM

## 2023-02-21 RX ORDER — HYDROCODONE BITARTRATE AND ACETAMINOPHEN 7.5; 325 MG/1; MG/1
1 TABLET ORAL EVERY 4 HOURS PRN
Qty: 42 TABLET | Refills: 0 | Status: SHIPPED | OUTPATIENT
Start: 2023-02-21

## 2023-02-21 RX ORDER — BUPIVACAINE HYDROCHLORIDE AND EPINEPHRINE 5; 5 MG/ML; UG/ML
INJECTION, SOLUTION EPIDURAL; INTRACAUDAL; PERINEURAL
Status: COMPLETED | OUTPATIENT
Start: 2023-02-21 | End: 2023-02-21

## 2023-02-21 RX ORDER — GLYCOPYRROLATE 0.2 MG/ML
INJECTION INTRAMUSCULAR; INTRAVENOUS AS NEEDED
Status: DISCONTINUED | OUTPATIENT
Start: 2023-02-21 | End: 2023-02-21 | Stop reason: SURG

## 2023-02-21 RX ORDER — LIDOCAINE HYDROCHLORIDE 10 MG/ML
0.5 INJECTION, SOLUTION EPIDURAL; INFILTRATION; INTRACAUDAL; PERINEURAL ONCE AS NEEDED
Status: DISCONTINUED | OUTPATIENT
Start: 2023-02-21 | End: 2023-02-21 | Stop reason: HOSPADM

## 2023-02-21 RX ORDER — FENTANYL CITRATE 50 UG/ML
50 INJECTION, SOLUTION INTRAMUSCULAR; INTRAVENOUS
Status: DISCONTINUED | OUTPATIENT
Start: 2023-02-21 | End: 2023-02-21 | Stop reason: HOSPADM

## 2023-02-21 RX ORDER — ROCURONIUM BROMIDE 10 MG/ML
INJECTION, SOLUTION INTRAVENOUS AS NEEDED
Status: DISCONTINUED | OUTPATIENT
Start: 2023-02-21 | End: 2023-02-21 | Stop reason: SURG

## 2023-02-21 RX ORDER — HYDROCODONE BITARTRATE AND ACETAMINOPHEN 7.5; 325 MG/1; MG/1
1 TABLET ORAL EVERY 4 HOURS PRN
Status: DISCONTINUED | OUTPATIENT
Start: 2023-02-21 | End: 2023-02-21 | Stop reason: HOSPADM

## 2023-02-21 RX ORDER — SODIUM CHLORIDE 0.9 % (FLUSH) 0.9 %
3 SYRINGE (ML) INJECTION EVERY 12 HOURS SCHEDULED
Status: DISCONTINUED | OUTPATIENT
Start: 2023-02-21 | End: 2023-02-21 | Stop reason: HOSPADM

## 2023-02-21 RX ORDER — HYDRALAZINE HYDROCHLORIDE 20 MG/ML
5 INJECTION INTRAMUSCULAR; INTRAVENOUS
Status: DISCONTINUED | OUTPATIENT
Start: 2023-02-21 | End: 2023-02-21 | Stop reason: HOSPADM

## 2023-02-21 RX ORDER — ACETAMINOPHEN 325 MG/1
650 TABLET ORAL 2 TIMES DAILY PRN
Qty: 60 TABLET | Refills: 0 | Status: SHIPPED | OUTPATIENT
Start: 2023-02-21

## 2023-02-21 RX ORDER — NALOXONE HCL 0.4 MG/ML
0.2 VIAL (ML) INJECTION AS NEEDED
Status: DISCONTINUED | OUTPATIENT
Start: 2023-02-21 | End: 2023-02-21 | Stop reason: HOSPADM

## 2023-02-21 RX ORDER — BUPIVACAINE HYDROCHLORIDE 2.5 MG/ML
INJECTION, SOLUTION EPIDURAL; INFILTRATION; INTRACAUDAL
Status: COMPLETED | OUTPATIENT
Start: 2023-02-21 | End: 2023-02-21

## 2023-02-21 RX ORDER — CEFAZOLIN SODIUM 2 G/100ML
2 INJECTION, SOLUTION INTRAVENOUS ONCE
Status: COMPLETED | OUTPATIENT
Start: 2023-02-21 | End: 2023-02-21

## 2023-02-21 RX ORDER — ONDANSETRON 4 MG/1
4 TABLET, FILM COATED ORAL EVERY 8 HOURS PRN
Qty: 30 TABLET | Refills: 0 | Status: SHIPPED | OUTPATIENT
Start: 2023-02-21

## 2023-02-21 RX ORDER — FENTANYL CITRATE 50 UG/ML
25 INJECTION, SOLUTION INTRAMUSCULAR; INTRAVENOUS
Status: DISCONTINUED | OUTPATIENT
Start: 2023-02-21 | End: 2023-02-21 | Stop reason: HOSPADM

## 2023-02-21 RX ORDER — PROMETHAZINE HYDROCHLORIDE 25 MG/1
25 TABLET ORAL ONCE AS NEEDED
Status: DISCONTINUED | OUTPATIENT
Start: 2023-02-21 | End: 2023-02-21 | Stop reason: HOSPADM

## 2023-02-21 RX ORDER — HYDROCODONE BITARTRATE AND ACETAMINOPHEN 5; 325 MG/1; MG/1
1 TABLET ORAL ONCE AS NEEDED
Status: DISCONTINUED | OUTPATIENT
Start: 2023-02-21 | End: 2023-02-21 | Stop reason: HOSPADM

## 2023-02-21 RX ORDER — DEXAMETHASONE SODIUM PHOSPHATE 4 MG/ML
INJECTION, SOLUTION INTRA-ARTICULAR; INTRALESIONAL; INTRAMUSCULAR; INTRAVENOUS; SOFT TISSUE AS NEEDED
Status: DISCONTINUED | OUTPATIENT
Start: 2023-02-21 | End: 2023-02-21 | Stop reason: SURG

## 2023-02-21 RX ORDER — FAMOTIDINE 10 MG/ML
20 INJECTION, SOLUTION INTRAVENOUS ONCE
Status: COMPLETED | OUTPATIENT
Start: 2023-02-21 | End: 2023-02-21

## 2023-02-21 RX ORDER — ISOPROPYL ALCOHOL 70 ML/100ML
LIQUID TOPICAL AS NEEDED
Status: DISCONTINUED | OUTPATIENT
Start: 2023-02-21 | End: 2023-02-21 | Stop reason: HOSPADM

## 2023-02-21 RX ORDER — DOCUSATE SODIUM 100 MG/1
100 CAPSULE, LIQUID FILLED ORAL 2 TIMES DAILY
Qty: 60 CAPSULE | Refills: 0 | Status: SHIPPED | OUTPATIENT
Start: 2023-02-21

## 2023-02-21 RX ORDER — ONDANSETRON 2 MG/ML
4 INJECTION INTRAMUSCULAR; INTRAVENOUS ONCE AS NEEDED
Status: COMPLETED | OUTPATIENT
Start: 2023-02-21 | End: 2023-02-21

## 2023-02-21 RX ORDER — LABETALOL HYDROCHLORIDE 5 MG/ML
5 INJECTION, SOLUTION INTRAVENOUS
Status: DISCONTINUED | OUTPATIENT
Start: 2023-02-21 | End: 2023-02-21 | Stop reason: HOSPADM

## 2023-02-21 RX ORDER — LIDOCAINE HYDROCHLORIDE 20 MG/ML
INJECTION, SOLUTION INTRAVENOUS AS NEEDED
Status: DISCONTINUED | OUTPATIENT
Start: 2023-02-21 | End: 2023-02-21 | Stop reason: SURG

## 2023-02-21 RX ORDER — SODIUM CHLORIDE, SODIUM LACTATE, POTASSIUM CHLORIDE, CALCIUM CHLORIDE 600; 310; 30; 20 MG/100ML; MG/100ML; MG/100ML; MG/100ML
9 INJECTION, SOLUTION INTRAVENOUS CONTINUOUS
Status: DISCONTINUED | OUTPATIENT
Start: 2023-02-21 | End: 2023-02-21 | Stop reason: HOSPADM

## 2023-02-21 RX ORDER — PROMETHAZINE HYDROCHLORIDE 25 MG/1
25 SUPPOSITORY RECTAL ONCE AS NEEDED
Status: DISCONTINUED | OUTPATIENT
Start: 2023-02-21 | End: 2023-02-21 | Stop reason: HOSPADM

## 2023-02-21 RX ORDER — SODIUM CHLORIDE 0.9 % (FLUSH) 0.9 %
3-10 SYRINGE (ML) INJECTION AS NEEDED
Status: DISCONTINUED | OUTPATIENT
Start: 2023-02-21 | End: 2023-02-21 | Stop reason: HOSPADM

## 2023-02-21 RX ORDER — PROPOFOL 10 MG/ML
VIAL (ML) INTRAVENOUS AS NEEDED
Status: DISCONTINUED | OUTPATIENT
Start: 2023-02-21 | End: 2023-02-21 | Stop reason: SURG

## 2023-02-21 RX ORDER — DIPHENHYDRAMINE HYDROCHLORIDE 50 MG/ML
12.5 INJECTION INTRAMUSCULAR; INTRAVENOUS
Status: DISCONTINUED | OUTPATIENT
Start: 2023-02-21 | End: 2023-02-21 | Stop reason: HOSPADM

## 2023-02-21 RX ORDER — EPHEDRINE SULFATE 50 MG/ML
5 INJECTION, SOLUTION INTRAVENOUS ONCE AS NEEDED
Status: DISCONTINUED | OUTPATIENT
Start: 2023-02-21 | End: 2023-02-21 | Stop reason: HOSPADM

## 2023-02-21 RX ORDER — ACETAMINOPHEN 650 MG
TABLET, EXTENDED RELEASE ORAL AS NEEDED
Status: DISCONTINUED | OUTPATIENT
Start: 2023-02-21 | End: 2023-02-21 | Stop reason: HOSPADM

## 2023-02-21 RX ORDER — MIDAZOLAM HYDROCHLORIDE 1 MG/ML
1 INJECTION INTRAMUSCULAR; INTRAVENOUS
Status: DISCONTINUED | OUTPATIENT
Start: 2023-02-21 | End: 2023-02-21 | Stop reason: HOSPADM

## 2023-02-21 RX ADMIN — Medication 200 MCG: at 09:28

## 2023-02-21 RX ADMIN — Medication 200 MCG: at 09:08

## 2023-02-21 RX ADMIN — ROCURONIUM BROMIDE 40 MG: 50 INJECTION INTRAVENOUS at 08:17

## 2023-02-21 RX ADMIN — ROCURONIUM BROMIDE 10 MG: 50 INJECTION INTRAVENOUS at 08:40

## 2023-02-21 RX ADMIN — NEOSTIGMINE METHYLSULFATE 3.5 MG: 0.5 INJECTION INTRAVENOUS at 09:33

## 2023-02-21 RX ADMIN — Medication 200 MCG: at 08:53

## 2023-02-21 RX ADMIN — FENTANYL CITRATE 50 MCG: 50 INJECTION, SOLUTION INTRAMUSCULAR; INTRAVENOUS at 07:19

## 2023-02-21 RX ADMIN — FENTANYL CITRATE 50 MCG: 50 INJECTION, SOLUTION INTRAMUSCULAR; INTRAVENOUS at 08:17

## 2023-02-21 RX ADMIN — LIDOCAINE HYDROCHLORIDE 80 MG: 20 INJECTION, SOLUTION INTRAVENOUS at 08:17

## 2023-02-21 RX ADMIN — DEXAMETHASONE SODIUM PHOSPHATE 6 MG: 4 INJECTION, SOLUTION INTRAMUSCULAR; INTRAVENOUS at 08:27

## 2023-02-21 RX ADMIN — Medication 100 MCG: at 09:19

## 2023-02-21 RX ADMIN — BUPIVACAINE HYDROCHLORIDE 5 ML: 2.5 INJECTION, SOLUTION EPIDURAL; INFILTRATION; INTRACAUDAL; PERINEURAL at 07:24

## 2023-02-21 RX ADMIN — BUPIVACAINE 10 ML: 13.3 INJECTION, SUSPENSION, LIPOSOMAL INFILTRATION at 07:24

## 2023-02-21 RX ADMIN — PROPOFOL 150 MG: 10 INJECTION, EMULSION INTRAVENOUS at 08:17

## 2023-02-21 RX ADMIN — ONDANSETRON 4 MG: 2 INJECTION INTRAMUSCULAR; INTRAVENOUS at 09:05

## 2023-02-21 RX ADMIN — Medication 100 MCG: at 08:49

## 2023-02-21 RX ADMIN — SODIUM CHLORIDE, POTASSIUM CHLORIDE, SODIUM LACTATE AND CALCIUM CHLORIDE 9 ML/HR: 600; 310; 30; 20 INJECTION, SOLUTION INTRAVENOUS at 07:15

## 2023-02-21 RX ADMIN — GLYCOPYRROLATE 0.6 MG: 1 INJECTION INTRAMUSCULAR; INTRAVENOUS at 09:33

## 2023-02-21 RX ADMIN — GLYCOPYRROLATE 0.2 MG: 1 INJECTION INTRAMUSCULAR; INTRAVENOUS at 08:40

## 2023-02-21 RX ADMIN — CEFAZOLIN SODIUM 2 G: 2 INJECTION, SOLUTION INTRAVENOUS at 08:02

## 2023-02-21 RX ADMIN — MIDAZOLAM 1 MG: 1 INJECTION INTRAMUSCULAR; INTRAVENOUS at 07:19

## 2023-02-21 RX ADMIN — BUPIVACAINE HYDROCHLORIDE AND EPINEPHRINE BITARTRATE 10 ML: 5; .0091 INJECTION, SOLUTION EPIDURAL; INTRACAUDAL; PERINEURAL at 07:24

## 2023-02-21 RX ADMIN — ONDANSETRON 4 MG: 2 INJECTION INTRAMUSCULAR; INTRAVENOUS at 10:06

## 2023-02-21 RX ADMIN — FAMOTIDINE 20 MG: 10 INJECTION INTRAVENOUS at 07:15

## 2023-02-21 NOTE — ANESTHESIA PREPROCEDURE EVALUATION
Anesthesia Evaluation     Patient summary reviewed   no history of anesthetic complications:  NPO Solid Status: > 8 hours  NPO Liquid Status: > 2 hours           Airway   Mallampati: II  TM distance: >3 FB  Neck ROM: full  No difficulty expected  Dental      Pulmonary     breath sounds clear to auscultation  (+) asthma,  (-) shortness of breath, recent URI, not a smoker  Cardiovascular   Exercise tolerance: good (4-7 METS)    ECG reviewed  PT is on anticoagulation therapy  Rhythm: regular  Rate: normal    (+) hypertension, CAD, DVT (recurrent, on xarelto), hyperlipidemia,     ROS comment: Sinus rhythm  Probable LVH with secondary repol abnrm  No Prior Tracing for Comparison  Electronically Signed By: Ga Oreilly ([facility]) 07-Feb-2023 13:35:01  Date and Time of Study: 2023-02-07 10:56:38    Neuro/Psych  (+) psychiatric history Anxiety,    (-) seizures, CVA  GI/Hepatic/Renal/Endo    (+)   thyroid problem   (-)  obesity, no renal disease, diabetes    Musculoskeletal     (-) neck stiffness  Abdominal    Substance History      OB/GYN          Other   arthritis,      ROS/Med Hx Other: Last Xarelto taken 2/18/2023                  Anesthesia Plan    ASA 3     general     (+ISB USGSS for POPC)  intravenous induction     Anesthetic plan, risks, benefits, and alternatives have been provided, discussed and informed consent has been obtained with: patient.    Use of blood products discussed with patient .   Plan discussed with CRNA.        CODE STATUS:

## 2023-02-21 NOTE — ANESTHESIA PROCEDURE NOTES
Airway  Urgency: elective    Date/Time: 2/21/2023 8:19 AM  Airway not difficult    General Information and Staff    Patient location during procedure: OR  CRNA/CAA: Dixie Curtis CRNA    Indications and Patient Condition  Indications for airway management: airway protection    Preoxygenated: yes  Mask difficulty assessment: 1 - vent by mask    Final Airway Details  Final airway type: endotracheal airway      Successful airway: ETT  Cuffed: yes   Successful intubation technique: direct laryngoscopy  Endotracheal tube insertion site: oral  Blade: Erlin  Blade size: 3  ETT size (mm): 7.0  Cormack-Lehane Classification: grade I - full view of glottis  Placement verified by: chest auscultation and capnometry   Measured from: lips  ETT/EBT  to lips (cm): 21  Number of attempts at approach: 1  Assessment: lips, teeth, and gum same as pre-op and atraumatic intubation    Additional Comments   ett cuff up at MOP

## 2023-02-21 NOTE — ADDENDUM NOTE
Addendum  created 02/21/23 1139 by Hugo Wallace MD    Attestation recorded in Intraprocedure, Intraprocedure Attestations filed

## 2023-02-21 NOTE — H&P
History & Physical       Patient: Sera Salas    YOB: 1955    Medical Record Number: [id number]    Chief Complaints: Preop    History of Present Illness: 67 y.o. female presents today in anticipation of upcoming surgery.  Denies any changes to medical history.  Denies any changes to her symptomatology.    Allergies: No Known Allergies    Home Medications:    Current Facility-Administered Medications:   •  ceFAZolin in dextrose (ANCEF) IVPB solution 2 g, 2 g, Intravenous, Once, Zachary William MD    Past Medical History:   Diagnosis Date   • Allergic    • Anxiety    • Arthritis    • Asthma    • Bleeding disorder (HCC)     no fefinitive diagnosis  hematologist following and needs further testing   • CAD (coronary artery disease)    • Disease of thyroid gland    • DVT, lower extremity, distal, acute, unspecified laterality (HCC)     on med   • Frequent UTI     wears pads   • H/O bone density study DUE   • H/O complete eye exam DUE   • HL (hearing loss) may 2021   • Hx of benign neoplasm of parathyroid gland     left side   • Hyperlipidemia    • Hypertension    • On continuous oral anticoagulation     xarelto   • Parathyroid gland disorder (HCC)     has 1parathyroid with mass and is waiting gor surgery to remove   • Seasonal allergies    • Shoulder pain     right rotator cuff tear          Past Surgical History:   Procedure Laterality Date   •  SECTION  ,    • COLONOSCOPY     • MAMMO BILATERAL  DUE   • PAP SMEAR  DUE   • PARATHYROIDECTOMY Left     x1   • THYROID SURGERY Left     partial   • TUBAL ABDOMINAL LIGATION            Social History     Occupational History   • Occupation:      Employer: [employer]   Tobacco Use   • Smoking status: Never   • Smokeless tobacco: Never   • Tobacco comments:     caffeine use 1-2 sodas daily   Vaping Use   • Vaping Use: Never used   Substance and Sexual Activity   • Alcohol use: Never   • Drug use: Never   • Sexual  activity: Yes     Birth control/protection: None      Social History     Social History Narrative   • Not on file          Family History   Problem Relation Age of Onset   • Stroke Mother    • Arthritis Mother    • Heart disease Brother    • Hypertension Brother    • Diabetes Brother    • Heart disease Brother    • Heart disease Other    • Hypertension Other    • Stroke Other    • Malig Hyperthermia Neg Hx        Review of Systems:  A 14 point review of systems is reviewed with the patient.  Pertinent positives are listed above.  All others are negative.    Physical Exam: 67 y.o. female    Vitals:    02/21/23 0639   BP: 132/78   Patient Position: Sitting   Pulse: 67   Resp: 18   Temp: 98 °F (36.7 °C)   TempSrc: Oral   SpO2: 97%       General:  Patient is awake and alert.  Appears in no acute distress or discomfort.    Psych:  Affect and demeanor are appropriate.    Eyes:  Conjunctiva and sclera appear grossly normal.  Eyes track well and EOM seem to be intact.    Ears:  No gross abnormalities.  Hearing adequate for the exam.    Cardiovascular:  Regular rate and rhythm.    Lungs:  Good chest expansion.  Breathing unlabored.    Lymph:  No palpable adenopathy about neck or axilla.    Extremity:  Skin benign and intact without evidence for swelling, masses or atrophy.  Exam otherwise deferred at this time.    Diagnostic Tests:  Lab Results   Component Value Date    GLUCOSE 98 02/07/2023    CALCIUM 10.3 02/07/2023     02/07/2023    K 3.8 02/07/2023    CO2 26.0 02/07/2023     02/07/2023    BUN 23 02/07/2023    CREATININE 0.82 02/07/2023    EGFRIFAFRI 77 06/02/2021    EGFRIFNONA 73 11/16/2021    BCR 28.0 (H) 02/07/2023    ANIONGAP 8.0 02/07/2023     Lab Results   Component Value Date    WBC 5.41 02/07/2023    HGB 12.7 02/07/2023    HCT 38.1 02/07/2023    MCV 95.0 02/07/2023     02/07/2023     Lab Results   Component Value Date    INR 2.1 06/21/2022    INR 1.2 06/09/2022    PROTIME 23.8 (H) 06/21/2022     PARMINDER 14.2 (H) 06/09/2022       Assessment:  Right rotator cuff tear    Plan: She denies any changes to her symptomatology.  Will proceed with surgery as planned.  I again offered her a chance to ask any questions about the upcoming procedure. She stated that she had a good understanding of everything and had no questions.    Zachary William MD  02/21/23

## 2023-02-21 NOTE — OP NOTE
Orthopaedic Operative Note    Facility: [facility]    Patient: Sera Salas    Medical Record Number: [id number]    YOB: 1955    Dictating Surgeon: Zachary William M.D.*    Primary Care Physician: Milena Stevenson APRN    Date of Operation: 2/21/2023    Pre-Operative Diagnosis:    1.  Right rotator cuff tendon tear  2.  Right knee osteoarthritis    Post-Operative Diagnosis:    1.  Right rotator cuff tendon tear  2.  Right shoulder degenerative superior labral tear with biceps instability  3.  Right knee intra-articular injection    Procedure Performed:   1.  Arthroscopic rotator cuff repair with subacromial decompression    2.  Arthroscopic biceps tenodesis  3.  Right knee intra-articular cortisone injection    Surgeon: Zachary William MD     Assistant: Winsome Cunningahm whose assistance was critical for help with patient positioning, suctioning and irrigation, retraction, manipulation of the extremity for insertion of the implants, wound closure and application of the bandages.  Her assistance was critical to the success of this case.     Anesthesia: Regional followed by general.     Complications: None.     Estimated Blood Loss: Less than 50 mL.     Implants: 1 Arthrex FiberTak anchor for medial row rotator cuff repair, 2 Arthrex 4.75 mm SwiveLock anchors for lateral row repair and biceps tenodesis    Specimens: * No orders in the log *    Brief Operative Indication: Ms. Salas had a history of a torn right rotator cuff which had been persistently symptomatic.  We talked about surgical and nonsurgical treatment options.  She was determined to be a candidate for repair.   I explained that surgical risks include infection, hematoma, hardware related complications including failure of fixation, cutout, arthrofibrosis, re-tear, persistent pain and/or loss of motion, iatrogenic nerve and/or blood vessel injury resulting in permanent weakness, numbness or dysfunction, DVT, PE,  positioning related neuropraxia, and anesthesia related complications resulting in death.  She also was complaining of right knee pain and wanted to get this injected.  I suggested that we could do this in the same surgical setting during her anesthetic.  The risk, benefits and alternatives to the injection were discussed.  She consented to proceed with this as well.    Description of Procedure in Detail:  The patient and operative site were identified in the preoperative holding area.  The surgical site was marked with the patient's confirmation.  Adequate regional anesthesia of the right upper extremity was administered by the anesthesiologist.  The patient was then taken to the operating room and placed in the supine position.  Adequate general anesthesia was then administered.  The patient was repositioned into the lateral decubitus position.  All bony prominences were carefully padded and protected.  The right upper extremity was prepped and draped in the standard sterile fashion.  I cleaned the extremity with an alcohol solution.  A Hibiclens scrub was performed.  Lastly, the extremity was prepped with 2 ChloraPrep preps.  I allowed those to dry for approximately 3 minutes before the draping procedure was carried out.  A timeout was taken and preoperative antibiotics administered prior to surgical incision.      The arm was placed into 10 pounds of lateral traction.  A standard posterior portal was established.  The scope was inserted into the joint and directed anteriorly to the rotator interval where a standard anterior portal was established.  A 7 mm cannula was inserted into the joint and then a diagnostic arthroscopy performed.  There was a small partial-thickness tear of the anterior supraspinatus but it appeared that her rotator cuff was intact from the articular side.  She did have detachment of the biceps anchor with a degenerative tear of the superior labrum.  The biceps was unstable.  I determined  that a tenodesis was indicated.  The long head of the biceps was released with arthroscopic scissors in anticipation of a later tenodesis of this structure.  I debrided the biceps stump and degenerative superior labral tear with a shaver.  The remainder of her labrum was probed and confirmed to be intact and stable.  Her articular cartilage looked normal.  The small partial tearing of the rotator cuff was debrided with a shaver and then I measured the depth of the tearing.  This was roughly 2 mm or so.  I did not consider that a repair was indicated at this point.  I directed my attention to the subacromial space.    A lateral portal was established approximately 2 cm off the lateral edge of the acromion.  A shaver was inserted and then an extensive bursectomy performed.  There was a modest subacromial spur and fraying of the coracoacromial ligament consistent with impingement.  An Arthrex Apollo device was used to release the coracoacromial ligament.  An acromioplasty was performed in the typical fashion using a barrel-tip betty.      The rotator cuff was then examined from the bursal side.  As predicted by her MRI, she did have a near full-thickness tear.  She had about a 1.5 cm with tear which extended to a depth of well over a centimeter.  There appeared to be just a thin intact veil of tissue on the articular side.  This was debrided to complete the tear.  The rotator cuff insertion site was debrided back to bleeding healthy-appearing bone to create a healing response.  The tuberosity was also microfractured to create a bone marrow healing response.  An accessory portal was established just off the lateral edge of the acromion and a single Arthrex triple-loaded anchor was then placed along the articular cartilage margin for the medial row repair.  The anchor seated well and got good purchase in the bone.    Using standard suture shuttling technique and a self-retrieving Scorpion device, the sutures were  sequentially shuttled through the rotator cuff tissue from anterior to posterior in a mattress fashion.  I placed a simple stitch posteriorly.  Once I had completed suture passage, the sutures were sequentially tied from posterior to anterior using 6 throw surgeon's knots with reverse half hitches on alternating posts.  I cut the excess sutures from the simple stitch but saved the mattress sutures for a lateral row repair.  I got an excellent repair along the medial row.  I was concerned that we may be at risk for a dogear between the 2 mattress stitches so I placed a fiber link suture through this tissue in between the 2 anchors.  This was passed through itself to create a grasping configuration.  I determined to incorporate this stitch into the lateral row.      There was sufficient good quality tissue to allow for placement of lateral row anchors.  2 of the sutures from the medial row anchor and the fiber link were brought out through the lateral portal and passed through a 4.75 mm SwiveLock anchor.  This anchor was punched and placed along the posterior aspect of the tuberosity.  The anchor seated well and got excellent purchase.  The excess sutures were cut and removed.  The remaining sutures were brought out through the lateral portal and passed through a second SwiveLock anchor.  This anchor was punched and placed adjacent to the biceps groove on the tuberosity to complete the double row construct.  Again, this anchor seated well and got excellent purchase.  The excess sutures were cut and removed but I saved the sutures from the anchor for the biceps tenodesis.  This created what seemed to be an anatomic repair of the rotator cuff footprint.  There were no loose ends or dog ears.  The tissue seemed to be well fixed without excessive tension.  Final images were taken and saved.      The biceps was exposed at the top of the groove.  This was delivered into the subacromial space.  The diseased, intra-articular  portion of the biceps was carefully debrided with a shaver.  The sutures from the swivel lock anchor were sequentially shuttled through the biceps using a self retrieving scorpion.  The sutures were then tied over the top of the biceps using a 6 throw surgeon's knot.  The excess sutures were cut and removed.  The biceps seem to be secure.  This was stable when probed.    The wounds were copiously irrigated out with sterile saline and closed in a layered fashion using Monocryl for the deep tissues and nylon for the skin.  Sterile dressings were applied.  The drapes were withdrawn.  The arm was placed in an immobilizer.     Next, the superolateral aspect of her right knee was prepped with a ChloraPrep.  I then cleaned that area with alcohol.  Under sterile conditions and without complication I injected a solution of 80 mg Depo-Medrol and 3 cc half percent bupivacaine with epinephrine.  A sterile bandage was applied.  Ms. Salas was awakened and taken to the recovery room in good condition.    Zachary William MD  02/21/23

## 2023-02-21 NOTE — NURSING NOTE
Dr lanza called because pt complaining of pressure in her bicep area noted moderate amount of swelling no drainage on dressing he was ok discharing pt.

## 2023-02-21 NOTE — BRIEF OP NOTE
SHOULDER ARTHROSCOPY WITH ROTATOR CUFF REPAIR  Progress Note    Sera Salas  2/21/2023    Pre-op Diagnosis:   Chronic right shoulder pain [M25.511, G89.29]       Post-Op Diagnosis Codes:     * Chronic right shoulder pain [M25.511, G89.29]    Procedure/CPT® Codes:        Procedure(s):  SHOULDER ARTHROSCOPY WITH ROTATOR CUFF REPAIR, biceps tenodesis INJECTION RIGHT KNEE        Surgeon(s):  Zachary William MD    Anesthesia: General with Block    Staff:   Circulator: Brittany Negrete RN  Scrub Person: Eliseo Hernandez; Marybeth Rosen  Assistant: Winsome Cunningham  Assistant: Winsome Cunningham      Estimated Blood Loss: 50 mL    Urine Voided: 0 mL    Specimens:                None          Drains: * No LDAs found *    Findings: see dictation        Complications: none    Assistant: Winsome Cunningham  was responsible for performing the following activities: Retraction and their skilled assistance was necessary for the success of this case.    Zachary William MD     Date: 2/21/2023  Time: 09:41 EST

## 2023-02-21 NOTE — ANESTHESIA POSTPROCEDURE EVALUATION
Patient: Sera Salas    Procedure Summary     Date: 02/21/23 Room / Location:  [facility] [address] [place] [address] [facility]    Anesthesia Start: 0808 Anesthesia Stop: 0950    Procedure: SHOULDER ARTHROSCOPY WITH ROTATOR CUFF REPAIR, INJECTION RIGHT KNEE (Right: Shoulder) Diagnosis:       Chronic right shoulder pain      (Chronic right shoulder pain [M25.511, G89.29])    Surgeons: Zachary William MD Provider: Hugo Wallace MD    Anesthesia Type: general ASA Status: 3          Anesthesia Type: general    Vitals  Vitals Value Taken Time   /71 02/21/23 1100   Temp 36.3 °C (97.4 °F) 02/21/23 1045   Pulse 55 02/21/23 1103   Resp 16 02/21/23 1100   SpO2 98 % 02/21/23 1103   Vitals shown include unvalidated device data.        Post Anesthesia Care and Evaluation    Patient location during evaluation: bedside  Patient participation: complete - patient participated  Level of consciousness: awake and alert  Pain score: 0  Pain management: adequate    Airway patency: patent  Anesthetic complications: No anesthetic complications  PONV Status: controlled  Cardiovascular status: acceptable and hemodynamically stable  Respiratory status: acceptable  Hydration status: acceptable    Comments: /71 (BP Location: Left arm, Patient Position: Sitting)   Pulse 55   Temp 36.3 °C (97.4 °F) (Oral)   Resp 18   SpO2 99%     POPC supplied by PNB with continued effect in expected distribution

## 2023-02-21 NOTE — ANESTHESIA PROCEDURE NOTES
Peripheral Block    Pre-sedation assessment completed: 2/21/2023 7:22 AM    Patient reassessed immediately prior to procedure    Patient location during procedure: holding area  Start time: 2/21/2023 7:24 AM  Stop time: 2/21/2023 7:27 AM  Reason for block: at surgeon's request and post-op pain management  Performed by  Anesthesiologist: Vikas Arriaga DO  Preanesthetic Checklist  Completed: patient identified, IV checked, site marked, risks and benefits discussed, surgical consent, monitors and equipment checked, pre-op evaluation and timeout performed  Prep:  Sterile barriers:gloves, mask, cap and washed/disinfected hands  Prep: ChloraPrep  Patient monitoring: blood pressure monitoring, continuous pulse oximetry and EKG  Procedure    Sedation: yes  Performed under: local infiltration  Guidance:ultrasound guided    ULTRASOUND INTERPRETATION.  Using ultrasound guidance a gauge needle was placed in close proximity to the brachial plexus nerve, at which point, under ultrasound guidance anesthetic was injected in the area of the nerve and spread of the anesthesia was seen on ultrasound in close proximity thereto.  There were no abnormalities seen on ultrasound; a digital image was taken; and the patient tolerated the procedure with no complications. Images:still images obtained, printed/placed on chart    Laterality:right  Block Type:interscalene  Injection Technique:single-shot  Needle Type:echogenic  Needle Gauge:22 G  Resistance on Injection: none    Medications Used: bupivacaine liposome (EXPAREL) 1.3 % injection - Peripheral Nerve   10 mL - 2/21/2023 7:24:00 AM  bupivacaine PF (MARCAINE) 0.25 % injection - Peripheral Nerve   5 mL - 2/21/2023 7:24:00 AM  bupivacaine-EPINEPHrine PF (MARCAINE w/EPI) 0.5% -[id number] injection - Peripheral Nerve   10 mL - 2/21/2023 7:24:00 AM      Medications  Comment:Ultrasound Interpretation:  Using ultrasound guidance the needle was placed in close proximity to the brachial  plexus and anesthetic was injected in the area of the nerve and spread of the anesthetic was seen on ultrasound in close proximity thereto.  There were no abnormalities seen on ultrasound; a digital image was taken; and the patient tolerated the procedure with no complications.         Post Assessment  Injection Assessment: negative aspiration for heme, no paresthesia on injection and incremental injection  Patient Tolerance:comfortable throughout block  Complications:no  Additional Notes  Ultrasound guidance was used to both view and verify local anesthetic placement and disbursement. In addition, it was used to evaluate the respective anatomy to determine needle approach and placement.

## 2023-02-22 ENCOUNTER — TELEPHONE (OUTPATIENT)
Dept: ORTHOPEDIC SURGERY | Facility: CLINIC | Age: 68
End: 2023-02-22
Payer: COMMERCIAL

## 2023-02-22 NOTE — TELEPHONE ENCOUNTER
Postop follow-up call.  I spoke to Ms. Salas.  Reports her nerve block is still mostly in effect.  I instructed her to postpone pendulum exercises until she has full sensation return to the entire arm.  We discussed other postop care instructions.  I encouraged her to call with any questions or concerns prior to her follow-up appointment next week.  She verbalized understanding of all we discussed and appreciated the call.

## 2023-03-01 ENCOUNTER — OFFICE VISIT (OUTPATIENT)
Dept: ORTHOPEDIC SURGERY | Facility: CLINIC | Age: 68
End: 2023-03-01
Payer: COMMERCIAL

## 2023-03-01 VITALS — TEMPERATURE: 97 F | WEIGHT: 154.8 LBS | HEIGHT: 64 IN | BODY MASS INDEX: 26.43 KG/M2

## 2023-03-01 DIAGNOSIS — Z09 SURGERY FOLLOW-UP: Primary | ICD-10-CM

## 2023-03-01 PROCEDURE — 99024 POSTOP FOLLOW-UP VISIT: CPT | Performed by: ORTHOPAEDIC SURGERY

## 2023-03-01 NOTE — PROGRESS NOTES
Sera Salas : 1955 MRN: [id number] DATE: 3/1/2023    CC: 1 week s/p right shoulder rotator cuff repair, biceps tenodesis    HPI: Patient returns to clinic today for follow up.  Reports pain is well controlled.  Denies fevers, drainage, redness or other concerning symptoms.  Reports compliance with use of the sling.    Vitals:    23 1048   Temp: 97 °F (36.1 °C)     Exam:  Wounds appear well-approximated.  Arm and forearm soft.  Shoulder moves fluidly with pendulums.  Good motor and sensory function in the hand and wrist.  Palpable radial pulse with brisk capillary refill    Impression:  1 week s/p right shoulder rotator cuff repair, biceps tenodesis    Plan:    1.  Begin PT per protocol--prescription given as well as 2 copies of my protocol.  2.  Continue shoulder immobilizer.  Instructions about the importance of compliance with this were carefully discussed.  3.  Follow up in 5 weeks   4.  Counseled the patient about appropriate activity modifications and restrictions, including no driving at this point.  5.  I explained to her that full healing of a rotator cuff repair take 6 months.  She will not be able to do any heavy lifting, pushing or pulling for the full duration of that time.  I have  her out of work for now because her job does require these activities.  If she cannot go back to work light duty then these restrictions will need to continue for the full duration of healing, 6 months    Zachary William MD

## 2023-03-10 ENCOUNTER — TREATMENT (OUTPATIENT)
Dept: PHYSICAL THERAPY | Facility: CLINIC | Age: 68
End: 2023-03-10
Payer: COMMERCIAL

## 2023-03-10 DIAGNOSIS — Z98.890 S/P RIGHT ROTATOR CUFF REPAIR: Primary | ICD-10-CM

## 2023-03-10 DIAGNOSIS — M25.611 DECREASED RIGHT SHOULDER RANGE OF MOTION: ICD-10-CM

## 2023-03-10 DIAGNOSIS — R29.898 WEAKNESS OF RIGHT UPPER EXTREMITY: ICD-10-CM

## 2023-03-10 PROCEDURE — 97140 MANUAL THERAPY 1/> REGIONS: CPT | Performed by: PHYSICAL THERAPIST

## 2023-03-10 PROCEDURE — 97110 THERAPEUTIC EXERCISES: CPT | Performed by: PHYSICAL THERAPIST

## 2023-03-10 PROCEDURE — 97161 PT EVAL LOW COMPLEX 20 MIN: CPT | Performed by: PHYSICAL THERAPIST

## 2023-03-10 NOTE — PROGRESS NOTES
Physical Therapy Initial Evaluation and Plan of Care    Patient: Sera Salas   : 1955  Diagnosis/ICD-10 Code:  S/P right rotator cuff repair [Z98.890]  Referring practitioner: Zachary William MD  Date of Initial Visit: 3/10/2023  Today's Date: 3/10/2023  Patient seen for 1 session         Visit Diagnoses:    ICD-10-CM ICD-9-CM   1. S/P right rotator cuff repair  Z98.890 V45.89   2. Decreased right shoulder range of motion  M25.611 719.51   3. Weakness of right upper extremity  R29.898 729.89         Subjective Questionnaire: QuickDASH: 54.55% limitation      Subjective Evaluation    History of Present Illness  Date of surgery: 2023  Mechanism of injury: S/p right RTC repair and biceps tenodesis 2023. Denies numbness/tingling. Wearing brace and doing pendulum exercises as instructed.       Patient Occupation:  at [employer], a lot of [employer] Quality of life: good    Pain  Current pain ratin  At best pain ratin  At worst pain ratin  Location: right shoulder  Quality: dull ache  Relieving factors: rest  Aggravating factors: movement    Social Support  Lives with: spouse and adult children    Hand dominance: right    Diagnostic Tests  MRI studies: abnormal (prior to surgery)    Patient Goals  Patient goals for therapy: decreased pain, increased strength, independence with ADLs/IADLs, return to work and increased motion             Objective          Observations     Additional Shoulder Observation Details  Incisions healing well without signs of infection    Neurological Testing     Sensation     Shoulder   Left Shoulder   Intact: light touch    Right Shoulder   Intact: light touch    Active Range of Motion   Left Shoulder   Normal active range of motion    Additional Active Range of Motion Details  AROM deferred on the right secondary to post op restrictions    Passive Range of Motion   Left Shoulder   Normal passive range of motion    Right Shoulder   Flexion: 90  (scapular plane) degrees   External rotation 0°: 15 degrees     Strength/Myotome Testing     Left Shoulder   Normal muscle strength    Additional Strength Details  R MMT deferred secondary to post op restrictions    Tests     Additional Tests Details  Special tests deferred secondary to post op restrictions          Assessment & Plan     Assessment  Impairments: abnormal or restricted ROM, activity intolerance, impaired physical strength, lacks appropriate home exercise program and pain with function  Functional Limitations: lifting, sleeping, uncomfortable because of pain, reaching behind back and reaching overhead  Assessment details: Pt presents with pain and limitation in ROM and strength consistent with post-op status.  Will benefit from PT per protocol to restore functional ROM and strength and allow pt to return to baseline performance of daily activities.   Prognosis: good    Goals  Plan Goals: Short Term Goals: 6 weeks. Patient will:  1. Be independent with initial HEP  2. Be instructed in posture and body mechanics.  3. Demonstrate full GH PROM to allow for progressing of therapy exercises.    Long Term Goals: 12 weeks. Pt will:  1. Exhibit (R) shoulder AROM to WFL to allow for reaching overhead and out (ABD) without pain limiting function.  2. Demonstrate improved Right UE MMT of >/= 4+/5 to allow for performance of ADL's/household management/recreational activities.  3. Pt able to reach overhead and lift 10# to allow for return to doing home/yard/recreational activities with min to no pain.  4. Report perceived disability </=10% based on QuickDASH    Plan  Therapy options: will be seen for skilled therapy services  Planned modality interventions: cryotherapy, TENS, thermotherapy (hydrocollator packs) and ultrasound  Planned therapy interventions: manual therapy, body mechanics training, neuromuscular re-education, postural training, soft tissue mobilization, strengthening, stretching, home exercise  program, functional ROM exercises, flexibility and joint mobilization  Frequency: 2x week  Duration in weeks: 12  Treatment plan discussed with: patient        History # of Personal Factors and/or Comorbidities: MODERATE (1-2)  Examination of Body System(s): # of elements: LOW (1-2)  Clinical Presentation: STABLE   Clinical Decision Making: LOW       Timed:         Manual Therapy:    10     mins  77046;     Therapeutic Exercise:    10     mins  97700;     Neuromuscular Sasha:    0    mins  03340;    Therapeutic Activity:     0     mins  03322;     Gait Trainin     mins  28847;     Ultrasound:     0     mins  96713;    Ionto                               0    mins   91136  Self Care                       0     mins   07191  Canalith Repos    0     mins 96959      Un-Timed:  Electrical Stimulation:    0     mins  42848 ( );  Dry Needling     0     mins self-pay  Traction     0     mins 67178  Low Eval     30     Mins  10316  Mod Eval     0     Mins  68807  High Eval                       0     Mins  46133        Timed Treatment:   20   mins   Total Treatment:     60   mins          PT: Joanie Petersen PT     License Number: [id number]  Electronically signed by Joanie Petersen PT, 03/10/23, 8:04 AM EST    Certification Period: 3/10/2023 thru 2023  I certify that the therapy services are furnished while this patient is under my care.  The services outlined above are required by this patient, and will be reviewed every 90 days.         Physician Signature:__________________________________________________    PHYSICIAN: Zachary William MD  NPI: [id number]                                      DATE:      Please sign and return via fax to [email or web address] . Thank you, [facility].

## 2023-03-13 NOTE — PATIENT INSTRUCTIONS
Pt was educated regarding relevant anatomy/physiology and plan of care.      Access Code: [email or web address]  URL: [email or web address]  Date: 03/13/2023  Prepared by: Joanie Petersen    Exercises  Circular Shoulder Pendulum with Table Support - 4 x daily - 2 sets - 15 reps  Wrist Circumduction AROM - 4 x daily - 2 sets - 15 reps  Seated Gripping Towel - 4 x daily - 1 sets - 10 reps  Seated Forearm Pronation and Supination AROM - 4 x daily - 2 sets - 15 reps

## 2023-03-15 ENCOUNTER — TREATMENT (OUTPATIENT)
Dept: PHYSICAL THERAPY | Facility: CLINIC | Age: 68
End: 2023-03-15
Payer: COMMERCIAL

## 2023-03-15 DIAGNOSIS — Z98.890 S/P RIGHT ROTATOR CUFF REPAIR: Primary | ICD-10-CM

## 2023-03-15 DIAGNOSIS — R29.898 WEAKNESS OF RIGHT UPPER EXTREMITY: ICD-10-CM

## 2023-03-15 DIAGNOSIS — M25.611 DECREASED RIGHT SHOULDER RANGE OF MOTION: ICD-10-CM

## 2023-03-15 PROCEDURE — 97110 THERAPEUTIC EXERCISES: CPT | Performed by: PHYSICAL THERAPIST

## 2023-03-15 PROCEDURE — 97112 NEUROMUSCULAR REEDUCATION: CPT | Performed by: PHYSICAL THERAPIST

## 2023-03-15 PROCEDURE — 97140 MANUAL THERAPY 1/> REGIONS: CPT | Performed by: PHYSICAL THERAPIST

## 2023-03-17 ENCOUNTER — TREATMENT (OUTPATIENT)
Dept: PHYSICAL THERAPY | Facility: CLINIC | Age: 68
End: 2023-03-17
Payer: COMMERCIAL

## 2023-03-17 DIAGNOSIS — Z98.890 S/P RIGHT ROTATOR CUFF REPAIR: Primary | ICD-10-CM

## 2023-03-17 DIAGNOSIS — R29.898 WEAKNESS OF RIGHT UPPER EXTREMITY: ICD-10-CM

## 2023-03-17 DIAGNOSIS — M25.611 DECREASED RIGHT SHOULDER RANGE OF MOTION: ICD-10-CM

## 2023-03-17 PROCEDURE — 97110 THERAPEUTIC EXERCISES: CPT | Performed by: PHYSICAL THERAPIST

## 2023-03-17 PROCEDURE — 97140 MANUAL THERAPY 1/> REGIONS: CPT | Performed by: PHYSICAL THERAPIST

## 2023-03-17 NOTE — PROGRESS NOTES
Physical Therapy Daily Treatment Note      Patient: Sera Salas   : 1955  Referring practitioner: Zachary William MD  Date of Initial Visit: Type: THERAPY  Noted: 3/10/2023  Today's Date: 3/17/2023  Patient seen for 3 sessions       Visit Diagnoses:    ICD-10-CM ICD-9-CM   1. S/P right rotator cuff repair  Z98.890 V45.89   2. Decreased right shoulder range of motion  M25.611 719.51   3. Weakness of right upper extremity  R29.898 729.89       Subjective   A little sore from new exercises on Wednesday. Otherwise doing OK.     Objective   See Exercise, Manual, and Modality Logs for complete treatment.       Assessment/Plan    Subjectively, pt reports no increase of pain or discomfort with interventions performed today. Requires consistent VC's to avoid muscle activation during PROM.      Timed:         Manual Therapy:    23     mins  21666;     Therapeutic Exercise:    8     mins  21691;     Neuromuscular Sasha:    0    mins  03541;    Therapeutic Activity:     0     mins  28372;     Gait Trainin     mins  13716;     Ultrasound:     0     mins  02146;    Ionto                               0    mins   87934  Self Care                       0     mins   54374  Canalith Repos    0     mins 43093      Un-Timed:  Electrical Stimulation:    0     mins  57271 ( );  Dry Needling     0     mins self-pay  Traction     0     mins 24831      Timed Treatment:   31   mins   Total Treatment:     41   mins    Joanie Petersen, PT  [place] License: [id number]

## 2023-03-18 NOTE — PROGRESS NOTES
Physical Therapy Daily Note    Patient: Sera Salas   : 1955  Diagnosis/ICD-10 Code:  S/P right rotator cuff repair [Z98.890]  Referring practitioner: Zachary William MD  Date of Initial Visit: Type: THERAPY  Noted: 3/10/2023  Today's Date: 3/18/2023  Patient seen for 2 session  Location of Service: [address] [place] [address]       Visit Diagnoses:    ICD-10-CM ICD-9-CM   1. S/P right rotator cuff repair  Z98.890 V45.89   2. Decreased right shoulder range of motion  M25.611 719.51   3. Weakness of right upper extremity  R29.898 729.89            Subjective  Sera Salas reported today that she's doing okay, very worried and unsure about when she's allowed to get out of her sling and what all she should be doing at home    Objective   No functional measures updated at today's visit unless otherwise stated. For functional assessment and documentation of patient progressions referred to the assessment section.    See Exercise, Manual, and Modality Logs for complete treatments.       Assessment/Plan  Tx today emphasized functional mobility and PROM of the shoulder along with NMRE for the parascapular and postural muscles to address current limitations and impairments in UE function. Pt appears to be progressing well with current treatment plan and tolerated today's treatment well. Pt continues to have limitations in the above mentioned areas and will continue to benefit from skilled PT to help pt regain functional mobility and strength necessary to reduce symptoms and return to PLOF.      Continue with current treatment plan and ongoing assessment; progress interventions to tolerance         Timed:         Manual Therapy:    10     mins  00199;     Therapeutic Exercise:    15     mins  11743;     Neuromuscular Sasha:    15    mins  04722;    Therapeutic Activity:     -     mins  52422;     Gait Training:           mins  70491;     Ultrasound:           mins  89988;    Ionto                                   mins   90085  Self Care                            mins   71019  Canalith Repos         mins 81340    Un-Timed:  Electrical Stimulation:         mins  64111 ( );  Dry Needling     -     mins self-pay  Traction          mins 31011  Low Eval          Mins  35396  Mod Eval          Mins  56513  High Eval                            Mins  30822      Timed Treatment:   40   mins   Total Treatment:     40   mins      PT: Jose Miguel Rodriguez PT     License Number: [id number]  Electronically signed by Jose Miguel Rodriguez PT, 03/18/23, 9:48 AM EDT

## 2023-03-20 ENCOUNTER — TREATMENT (OUTPATIENT)
Dept: PHYSICAL THERAPY | Facility: CLINIC | Age: 68
End: 2023-03-20
Payer: COMMERCIAL

## 2023-03-20 DIAGNOSIS — Z98.890 S/P RIGHT ROTATOR CUFF REPAIR: Primary | ICD-10-CM

## 2023-03-20 DIAGNOSIS — M25.611 DECREASED RIGHT SHOULDER RANGE OF MOTION: ICD-10-CM

## 2023-03-20 DIAGNOSIS — R29.898 WEAKNESS OF RIGHT UPPER EXTREMITY: ICD-10-CM

## 2023-03-20 PROCEDURE — 97140 MANUAL THERAPY 1/> REGIONS: CPT | Performed by: PHYSICAL THERAPIST

## 2023-03-20 PROCEDURE — 97110 THERAPEUTIC EXERCISES: CPT | Performed by: PHYSICAL THERAPIST

## 2023-03-20 NOTE — PROGRESS NOTES
Physical Therapy Daily Treatment Note  [facility] [place]   [address], [address]  [place] [address]  P: [phone]       F: [phone]    Patient: Sera Salas   : 1955  Diagnosis/ICD-10 Code:  S/P right rotator cuff repair [Z98.890]  Referring practitioner: Zachary William MD  Date of Initial Visit: Type: THERAPY  Noted: 3/10/2023  Today's Date: 3/20/2023  Patient seen for 4 sessions         Sera Salas reports: R shoulder was really sore yesterday but it feels better today. No trouble with home exercises so far.     Subjective     Objective   See Exercise, Manual, and Modality Logs for complete treatment.       Assessment/Plan  Subjectively, pt reports no increase of pain or discomfort with interventions performed today. Performed well with added table walkaways for increased passive shoulder flexion, added to HEP. Pt educated to move through a pain free range of motion and to not bear weight through hands. Continues to demonstrate good R shoulder ROM with stiffness in all planes, especially neutral ER. Continues to benefit from verbal/tactile cues to ensure proper form and technique for exercise performance.     Progress per Plan of Care           Manual Therapy:    20     mins  51468;  Therapeutic Exercise:    10     mins  66301;     Neuromuscular Sasha:        mins  04462;    Therapeutic Activity:          mins  28198;     Gait Training:           mins  90386;     Ultrasound:          mins  34125;    Electrical Stimulation:         mins  76330 ( );  Dry Needling          mins self-pay    Timed Treatment:   30   mins   Total Treatment:     40   mins    Yudith Hays PTA  Physical Therapist Assistant [id number]

## 2023-03-22 ENCOUNTER — TREATMENT (OUTPATIENT)
Dept: PHYSICAL THERAPY | Facility: CLINIC | Age: 68
End: 2023-03-22
Payer: COMMERCIAL

## 2023-03-22 DIAGNOSIS — M25.611 DECREASED RIGHT SHOULDER RANGE OF MOTION: ICD-10-CM

## 2023-03-22 DIAGNOSIS — Z98.890 S/P RIGHT ROTATOR CUFF REPAIR: Primary | ICD-10-CM

## 2023-03-22 DIAGNOSIS — R29.898 WEAKNESS OF RIGHT UPPER EXTREMITY: ICD-10-CM

## 2023-03-22 PROCEDURE — 97140 MANUAL THERAPY 1/> REGIONS: CPT | Performed by: PHYSICAL THERAPIST

## 2023-03-22 PROCEDURE — 97110 THERAPEUTIC EXERCISES: CPT | Performed by: PHYSICAL THERAPIST

## 2023-03-27 ENCOUNTER — TREATMENT (OUTPATIENT)
Dept: PHYSICAL THERAPY | Facility: CLINIC | Age: 68
End: 2023-03-27
Payer: COMMERCIAL

## 2023-03-27 DIAGNOSIS — M25.611 DECREASED RIGHT SHOULDER RANGE OF MOTION: ICD-10-CM

## 2023-03-27 DIAGNOSIS — R29.898 WEAKNESS OF RIGHT UPPER EXTREMITY: ICD-10-CM

## 2023-03-27 DIAGNOSIS — Z98.890 S/P RIGHT ROTATOR CUFF REPAIR: Primary | ICD-10-CM

## 2023-03-27 PROCEDURE — 97110 THERAPEUTIC EXERCISES: CPT | Performed by: PHYSICAL THERAPIST

## 2023-03-27 PROCEDURE — 97140 MANUAL THERAPY 1/> REGIONS: CPT | Performed by: PHYSICAL THERAPIST

## 2023-03-27 NOTE — PROGRESS NOTES
Physical Therapy Daily Treatment Note      Patient: Sera Salas   : 1955  Referring practitioner: Zachary Wililam MD  Date of Initial Visit: Type: THERAPY  Noted: 3/10/2023  Today's Date: 3/22/2023  Patient seen for 5 sessions       Visit Diagnoses:    ICD-10-CM ICD-9-CM   1. S/P right rotator cuff repair  Z98.890 V45.89   2. Decreased right shoulder range of motion  M25.611 719.51   3. Weakness of right upper extremity  R29.898 729.89       Subjective   No new complaints. Continued adherence with HEP.     Objective   See Exercise, Manual, and Modality Logs for complete treatment.       Assessment/Plan    Subjectively, pt reports no increase of pain or discomfort with interventions performed today. PROM is steadily improving.         Timed:         Manual Therapy:    15     mins  71484;     Therapeutic Exercise:    8     mins  27792;     Neuromuscular Sasha:    0    mins  00316;    Therapeutic Activity:     0     mins  52667;     Gait Trainin     mins  23977;     Ultrasound:     0     mins  44757;    Ionto                               0    mins   67921  Self Care                       0     mins   01811  Canalith Repos    0     mins 86248      Un-Timed:  Electrical Stimulation:    0     mins  75020 ( );  Dry Needling     0     mins self-pay  Traction     0     mins 82880      Timed Treatment:   23   mins   Total Treatment:     33   mins    Joanie Petersen, PT  [place] License: [id number]

## 2023-03-27 NOTE — PROGRESS NOTES
Physical Therapy Daily Treatment Note      Patient: Sera Salas   : 1955  Referring practitioner: Zachary William MD  Date of Initial Visit: Type: THERAPY  Noted: 3/10/2023  Today's Date: 3/27/2023  Patient seen for 6 sessions       Visit Diagnoses:    ICD-10-CM ICD-9-CM   1. S/P right rotator cuff repair  Z98.890 V45.89   2. Decreased right shoulder range of motion  M25.611 719.51   3. Weakness of right upper extremity  R29.898 729.89       Subjective   Pt states shoulder is a little sore, overall OK.    Objective   See Exercise, Manual, and Modality Logs for complete treatment.       Assessment/Plan    Subjectively, pt reports no increase of pain or discomfort with interventions performed today. PROM continues to improve.        Timed:         Manual Therapy:    23     mins  29162;     Therapeutic Exercise:    8     mins  62113;     Neuromuscular Sasha:    0    mins  79178;    Therapeutic Activity:     0     mins  18503;     Gait Trainin     mins  53459;     Ultrasound:     0     mins  25474;    Ionto                               0    mins   61842  Self Care                       0     mins   14147  Canalith Repos    0     mins 14470      Un-Timed:  Electrical Stimulation:    0     mins  26485 (MC );  Dry Needling     0     mins self-pay  Traction     0     mins 91566      Timed Treatment:   31   mins   Total Treatment:     41   mins    Joanie Petersen, PT  [place] License: [id number]

## 2023-03-31 ENCOUNTER — TREATMENT (OUTPATIENT)
Dept: PHYSICAL THERAPY | Facility: CLINIC | Age: 68
End: 2023-03-31
Payer: COMMERCIAL

## 2023-03-31 DIAGNOSIS — R29.898 WEAKNESS OF RIGHT UPPER EXTREMITY: ICD-10-CM

## 2023-03-31 DIAGNOSIS — Z98.890 S/P RIGHT ROTATOR CUFF REPAIR: Primary | ICD-10-CM

## 2023-03-31 DIAGNOSIS — M25.611 DECREASED RIGHT SHOULDER RANGE OF MOTION: ICD-10-CM

## 2023-03-31 PROCEDURE — 97140 MANUAL THERAPY 1/> REGIONS: CPT | Performed by: PHYSICAL THERAPIST

## 2023-03-31 PROCEDURE — 97110 THERAPEUTIC EXERCISES: CPT | Performed by: PHYSICAL THERAPIST

## 2023-04-02 NOTE — PROGRESS NOTES
Physical Therapy Daily Treatment Note      Patient: Sera Salas   : 1955  Referring practitioner: Zachary William MD  Date of Initial Visit: Type: THERAPY  Noted: 3/10/2023  Today's Date: 3/31/2023  Patient seen for 7 sessions       Visit Diagnoses:    ICD-10-CM ICD-9-CM   1. S/P right rotator cuff repair  Z98.890 V45.89   2. Decreased right shoulder range of motion  M25.611 719.51   3. Weakness of right upper extremity  R29.898 729.89       Subjective   Pt states her MD appointment has been moved to 4/10.     Objective   See Exercise, Manual, and Modality Logs for complete treatment.       Assessment/Plan    Subjectively, pt reports no increase of pain or discomfort with interventions performed today. Fluid motion of shoulder, with steadily improving PROM.      Timed:         Manual Therapy:    15     mins  11720;     Therapeutic Exercise:    8     mins  46995;     Neuromuscular Sasha:    0    mins  08352;    Therapeutic Activity:     0     mins  16702;     Gait Trainin     mins  36768;     Ultrasound:     0     mins  28242;    Ionto                               0    mins   54843  Self Care                       0     mins   93895  Canalith Repos    0     mins 82614      Un-Timed:  Electrical Stimulation:    0     mins  01094 ( );  Dry Needling     0     mins self-pay  Traction     0     mins 60375      Timed Treatment:   23   mins   Total Treatment:     33   mins    Joanie Petersen, PT  [place] License: [id number]

## 2023-04-03 ENCOUNTER — TREATMENT (OUTPATIENT)
Dept: PHYSICAL THERAPY | Facility: CLINIC | Age: 68
End: 2023-04-03
Payer: COMMERCIAL

## 2023-04-03 DIAGNOSIS — R29.898 WEAKNESS OF RIGHT UPPER EXTREMITY: ICD-10-CM

## 2023-04-03 DIAGNOSIS — M25.611 DECREASED RIGHT SHOULDER RANGE OF MOTION: ICD-10-CM

## 2023-04-03 DIAGNOSIS — Z98.890 S/P RIGHT ROTATOR CUFF REPAIR: Primary | ICD-10-CM

## 2023-04-03 PROCEDURE — 97140 MANUAL THERAPY 1/> REGIONS: CPT | Performed by: PHYSICAL THERAPIST

## 2023-04-03 PROCEDURE — 97110 THERAPEUTIC EXERCISES: CPT | Performed by: PHYSICAL THERAPIST

## 2023-04-03 NOTE — PROGRESS NOTES
Physical Therapy Daily Treatment Note  [facility] [place]   [address], [address]  [place] [address]  P: [phone]       F: [phone]    Patient: Sera Salas   : 1955  Diagnosis/ICD-10 Code:  S/P right rotator cuff repair [Z98.890]  Referring practitioner: Zachary William MD  Date of Initial Visit: Type: THERAPY  Noted: 3/10/2023  Today's Date: 4/3/2023  Patient seen for 8 sessions         Sera Salas reports: my R arm/shoulder is pretty sore after PT sessions, but soreness wears off after a couple days. Exercises are going well.     Subjective     Objective   See Exercise, Manual, and Modality Logs for complete treatment.       Assessment/Plan  Subjectively, pt reports no increase of pain or discomfort with interventions performed today. Performed well with continued shoulder mobility and scapular awareness exercises as protocol allows. Continues to demonstrate stiffness in all R shoulder planes PROM.  Continues to benefit from verbal/tactile cues to ensure proper form and technique for exercise performance.     Progress per Plan of Care           Manual Therapy:    15     mins  64871;  Therapeutic Exercise:    15     mins  45296;     Neuromuscular Sasha:        mins  18253;    Therapeutic Activity:          mins  15560;     Gait Training:           mins  16721;     Ultrasound:          mins  19869;    Electrical Stimulation:         mins  05131 ( );  Dry Needling          mins self-pay    Timed Treatment:   30   mins   Total Treatment:     40   mins    Yudith Hays PTA  Physical Therapist Assistant [id number]

## 2023-04-05 ENCOUNTER — TREATMENT (OUTPATIENT)
Dept: PHYSICAL THERAPY | Facility: CLINIC | Age: 68
End: 2023-04-05
Payer: COMMERCIAL

## 2023-04-05 DIAGNOSIS — R29.898 WEAKNESS OF RIGHT UPPER EXTREMITY: ICD-10-CM

## 2023-04-05 DIAGNOSIS — Z98.890 S/P RIGHT ROTATOR CUFF REPAIR: Primary | ICD-10-CM

## 2023-04-05 DIAGNOSIS — M25.611 DECREASED RIGHT SHOULDER RANGE OF MOTION: ICD-10-CM

## 2023-04-05 PROCEDURE — 97140 MANUAL THERAPY 1/> REGIONS: CPT | Performed by: PHYSICAL THERAPIST

## 2023-04-05 PROCEDURE — 97110 THERAPEUTIC EXERCISES: CPT | Performed by: PHYSICAL THERAPIST

## 2023-04-05 NOTE — PROGRESS NOTES
Physical Therapy Daily Treatment Note  [facility] [place]   [address], [address]  [place] [address]  P: [phone]       F: [phone]    Patient: Sera Salas   : 1955  Diagnosis/ICD-10 Code:  S/P right rotator cuff repair [Z98.890]  Referring practitioner: Zachary William MD  Date of Initial Visit: Type: THERAPY  Noted: 3/10/2023  Today's Date: 2023  Patient seen for 9 sessions         Sera Salas reports: not as sore after last time, feeling okay today.    Subjective     Objective   See Exercise, Manual, and Modality Logs for complete treatment.       Assessment/Plan  Subjectively, pt reports no increase of pain or discomfort with interventions performed today. Performed well with added cane ER for increased shoulder mobiliy. Continues to demonstrate increased ROM in all planes, especially flexion with approx 110 degrees. Continues to benefit from verbal/tactile cues to ensure proper form and technique for exercise performance.     Progress per Plan of Care           Manual Therapy:    15     mins  67222;  Therapeutic Exercise:    15     mins  01615;     Neuromuscular Sasha:        mins  17670;    Therapeutic Activity:          mins  38186;     Gait Training:           mins  72471;     Ultrasound:          mins  49528;    Electrical Stimulation:         mins  12828 ( );  Dry Needling          mins self-pay    Timed Treatment:   30   mins   Total Treatment:     40   mins    Yudith Hays PTA  Physical Therapist Assistant [phone]

## 2023-04-10 ENCOUNTER — OFFICE VISIT (OUTPATIENT)
Dept: ORTHOPEDIC SURGERY | Facility: CLINIC | Age: 68
End: 2023-04-10
Payer: COMMERCIAL

## 2023-04-10 ENCOUNTER — TREATMENT (OUTPATIENT)
Dept: PHYSICAL THERAPY | Facility: CLINIC | Age: 68
End: 2023-04-10
Payer: COMMERCIAL

## 2023-04-10 VITALS — TEMPERATURE: 96.6 F | WEIGHT: 155.8 LBS | BODY MASS INDEX: 26.6 KG/M2 | HEIGHT: 64 IN

## 2023-04-10 DIAGNOSIS — R29.898 WEAKNESS OF RIGHT UPPER EXTREMITY: ICD-10-CM

## 2023-04-10 DIAGNOSIS — Z09 SURGERY FOLLOW-UP: Primary | ICD-10-CM

## 2023-04-10 DIAGNOSIS — Z98.890 S/P RIGHT ROTATOR CUFF REPAIR: Primary | ICD-10-CM

## 2023-04-10 DIAGNOSIS — M25.611 DECREASED RIGHT SHOULDER RANGE OF MOTION: ICD-10-CM

## 2023-04-10 PROCEDURE — 97140 MANUAL THERAPY 1/> REGIONS: CPT | Performed by: PHYSICAL THERAPIST

## 2023-04-10 PROCEDURE — 97110 THERAPEUTIC EXERCISES: CPT | Performed by: PHYSICAL THERAPIST

## 2023-04-10 PROCEDURE — 97530 THERAPEUTIC ACTIVITIES: CPT | Performed by: PHYSICAL THERAPIST

## 2023-04-10 PROCEDURE — 99024 POSTOP FOLLOW-UP VISIT: CPT | Performed by: NURSE PRACTITIONER

## 2023-04-10 NOTE — PROGRESS NOTES
Physical Therapy Progress Note  Patient: Sera Salas   : 1955  Diagnosis/ICD-10 Code:  S/P right rotator cuff repair [Z98.890]  Referring practitioner: Zachary William MD  Date of Initial Visit: Type: THERAPY  Noted: 3/10/2023  Today's Date: 4/10/2023  Patient seen for 10 sessions         Visit Diagnoses:    ICD-10-CM ICD-9-CM   1. S/P right rotator cuff repair  Z98.890 V45.89   2. Decreased right shoulder range of motion  M25.611 719.51   3. Weakness of right upper extremity  R29.898 729.89         Sera Salas reports: pain is steadily improving. Adherent with HEP.   Subjective Questionnaire: QuickDASH: 45.45% limitation (improved from 55% at initial evaluation)  Clinical Progress: improved  Home Program Compliance: Yes  Treatment has included: therapeutic exercise, manual therapy and cryotherapy      Subjective       Objective          Passive Range of Motion     Right Shoulder   Flexion: 128 degrees   External rotation 0°: 31 degrees           Assessment/Plan    Short Term Goals: 6 weeks. Patient will:  1. Be independent with initial HEP (MET)  2. Be instructed in posture and body mechanics. (MET)  3. Demonstrate full GH PROM to allow for progressing of therapy exercises. (PROGRESSING)    Long Term Goals: 12 weeks. Pt will:  1. Exhibit (R) shoulder AROM to WFL to allow for reaching overhead and out (ABD) without pain limiting function. (NOT MET)  2. Demonstrate improved Right UE MMT of >/= 4+/5 to allow for performance of ADL's/household management/recreational activities. (NOT MET)  3. Pt able to reach overhead and lift 10# to allow for return to doing home/yard/recreational activities with min to no pain. (NOT MET)  4. Report perceived disability </=10% based on QuickDASH (PROGRESSING)     Recommendations: Continue per protocol  Timeframe: 1 month  Prognosis to achieve goals: good      Timed:         Manual Therapy:    15     mins  96656;     Therapeutic Exercise:    10     mins  96762;      Neuromuscular Sasha:    0    mins  16879;    Therapeutic Activity:     8     mins  08504;     Gait Trainin     mins  79828;     Ultrasound:     0     mins  50352;    Ionto                               0    mins   20285  Self Care                       0     mins   15346  Canalith Repos    0     mins 32080      Un-Timed:  Electrical Stimulation:    0     mins  23632 ( );  Dry Needling     0     mins self-pay  Traction     0     mins 01328  Re-Eval                           0    mins  52402      Timed Treatment:   33   mins   Total Treatment:     43   mins          PT: Joanie Petersen, PT     [place] License:  [id number]    Electronically signed by Joanie Petersen, PT, 04/10/23, 12:00 PM EDT

## 2023-04-10 NOTE — PROGRESS NOTES
Sera Salas : 1955 MRN: [id number] DATE: 4/10/2023    CC: 6 weeks s/p right shoulder rotator cuff repair, biceps tenodesis    HPI: Pt. returns to clinic today for follow up.  Reports pain is well controlled.  Reports compliance with use of the sling and physical therapy.  Denies any new issues or problems.    Vitals:    04/10/23 1300   Temp: 96.6 °F (35.9 °C)       Exam:  Wounds appear well-healed.  Arm and forearm soft.  Shoulder moves fluidly and motion is on track per protocol.  Good motor and sensory function distally.  Palpable radial pulse with good cap refill.      Impression:  6 weeks s/p right shoulder rotator cuff repair, biceps tenodesis    Plan:    1.  Continue PT per protocol.  We discussed the importance of home exercises as well.  2.  Discontinue shoulder immobilizer and begin working on progressive ROM per protocol.  3.  Counseled the patient about appropriate activity modifications and restrictions.  No lifting, pushing, or pulling greater than 2 pounds with right arm.  No working overhead or repetitive motions until strengthening goals are met to avoid compromise to her surgical procedure.   4.  Patient to remain off work until follow up with Dr. William in 6 weeks for reevaluation.   5.  I explained the recovery period for this type of surgery may take up to 6 months.      Kait Mccoy, JOSÉ     04/10/2023

## 2023-04-14 ENCOUNTER — TREATMENT (OUTPATIENT)
Dept: PHYSICAL THERAPY | Facility: CLINIC | Age: 68
End: 2023-04-14
Payer: COMMERCIAL

## 2023-04-14 DIAGNOSIS — M25.611 DECREASED RIGHT SHOULDER RANGE OF MOTION: ICD-10-CM

## 2023-04-14 DIAGNOSIS — Z98.890 S/P RIGHT ROTATOR CUFF REPAIR: Primary | ICD-10-CM

## 2023-04-14 DIAGNOSIS — R29.898 WEAKNESS OF RIGHT UPPER EXTREMITY: ICD-10-CM

## 2023-04-14 NOTE — PROGRESS NOTES
Physical Therapy Daily Treatment Note      Patient: Sera Salas   : 1955  Referring practitioner: Zachary William MD  Date of Initial Visit: Type: THERAPY  Noted: 3/10/2023  Today's Date: 2023  Patient seen for 11 sessions       Visit Diagnoses:    ICD-10-CM ICD-9-CM   1. S/P right rotator cuff repair  Z98.890 V45.89   2. Decreased right shoulder range of motion  M25.611 719.51   3. Weakness of right upper extremity  R29.898 729.89       Subjective   Pt states she was seen for follow up in MD office, ARNP is pleased with progress and allowed her to D/C sling.     Objective   See Exercise, Manual, and Modality Logs for complete treatment.       Assessment/Plan    Subjectively, pt reports no increase of pain or discomfort with interventions performed today. Pt tolerated pulley quite well, issued one for home use.         Timed:         Manual Therapy:    15     mins  18837;     Therapeutic Exercise:    10     mins  14757;     Neuromuscular Sasha:    0    mins  70561;    Therapeutic Activity:     10     mins  48627;     Gait Trainin     mins  92314;     Ultrasound:     0     mins  59869;    Ionto                               0    mins   31639  Self Care                       0     mins   13752  Canalith Repos    0     mins 55085      Un-Timed:  Electrical Stimulation:    0     mins  96125 ( );  Dry Needling     0     mins self-pay  Traction     0     mins 84457      Timed Treatment:   35   mins   Total Treatment:     45   mins    Joanie Petersen, PT  [place] License: [id number]

## 2023-04-17 ENCOUNTER — TREATMENT (OUTPATIENT)
Dept: PHYSICAL THERAPY | Facility: CLINIC | Age: 68
End: 2023-04-17
Payer: COMMERCIAL

## 2023-04-17 DIAGNOSIS — M25.611 DECREASED RIGHT SHOULDER RANGE OF MOTION: ICD-10-CM

## 2023-04-17 DIAGNOSIS — R29.898 WEAKNESS OF RIGHT UPPER EXTREMITY: ICD-10-CM

## 2023-04-17 DIAGNOSIS — Z98.890 S/P RIGHT ROTATOR CUFF REPAIR: Primary | ICD-10-CM

## 2023-04-21 ENCOUNTER — TREATMENT (OUTPATIENT)
Dept: PHYSICAL THERAPY | Facility: CLINIC | Age: 68
End: 2023-04-21
Payer: COMMERCIAL

## 2023-04-21 DIAGNOSIS — Z98.890 S/P RIGHT ROTATOR CUFF REPAIR: Primary | ICD-10-CM

## 2023-04-21 DIAGNOSIS — M25.611 DECREASED RIGHT SHOULDER RANGE OF MOTION: ICD-10-CM

## 2023-04-21 DIAGNOSIS — R29.898 WEAKNESS OF RIGHT UPPER EXTREMITY: ICD-10-CM

## 2023-04-21 NOTE — PROGRESS NOTES
Physical Therapy Daily Treatment Note      Patient: Sera Salas   : 1955  Referring practitioner: Zachary William MD  Date of Initial Visit: Type: THERAPY  Noted: 3/10/2023  Today's Date: 2023  Patient seen for 12 sessions       Visit Diagnoses:    ICD-10-CM ICD-9-CM   1. S/P right rotator cuff repair  Z98.890 V45.89   2. Decreased right shoulder range of motion  M25.611 719.51   3. Weakness of right upper extremity  R29.898 729.89       Subjective   Pt states she has been using her pulley at home, it feels good to move her shoulder.     Objective   See Exercise, Manual, and Modality Logs for complete treatment.       Assessment/Plan    Subjectively, pt reports no increase of pain or discomfort with interventions performed today. Performed well with continued AAROM exercises. Continues to benefit from verbal/tactile cues to ensure proper form and technique for exercise performance.        Timed:         Manual Therapy:    10     mins  22365;     Therapeutic Exercise:    15     mins  21962;     Neuromuscular Sasha:    0    mins  01124;    Therapeutic Activity:     10     mins  70971;     Gait Trainin     mins  08206;     Ultrasound:     0     mins  40034;    Ionto                               0    mins   15646  Self Care                       0     mins   16780  Canalith Repos    0     mins 43005      Un-Timed:  Electrical Stimulation:    0     mins  87355 ( );  Dry Needling     0     mins self-pay  Traction     0     mins 40898      Timed Treatment:   35   mins   Total Treatment:     45   mins    Joanie Petersen, PT  [place] License: [id number]

## 2023-04-21 NOTE — PROGRESS NOTES
Physical Therapy Daily Treatment Note      Patient: Sera Salas   : 1955  Referring practitioner: Zachary William MD  Date of Initial Visit: Type: THERAPY  Noted: 3/10/2023  Today's Date: 2023  Patient seen for 13 sessions       Visit Diagnoses:    ICD-10-CM ICD-9-CM   1. S/P right rotator cuff repair  Z98.890 V45.89   2. Decreased right shoulder range of motion  M25.611 719.51   3. Weakness of right upper extremity  R29.898 729.89       Subjective   Pt states her shoulder feels better now that she is out of the sling and doing some exercise.     Objective          Passive Range of Motion     Right Shoulder   Flexion: 150 degrees       See Exercise, Manual, and Modality Logs for complete treatment.       Assessment/Plan    Subjectively, pt reports no increase of pain or discomfort with interventions performed today. Continues to demonstrate good tolerance to exercise progressions. Continues to benefit from verbal/tactile cues to ensure proper form and technique for exercise performance.        Timed:         Manual Therapy:    10     mins  08001;     Therapeutic Exercise:    15     mins  57416;     Neuromuscular Sasha:    0    mins  90091;    Therapeutic Activity:     8     mins  16261;     Gait Trainin     mins  58875;     Ultrasound:     0     mins  54006;    Ionto                               0    mins   76349  Self Care                       0     mins   42411  Canalith Repos    0     mins 27623      Un-Timed:  Electrical Stimulation:    0     mins  16598 ( );  Dry Needling     0     mins self-pay  Traction     0     mins 63374      Timed Treatment:   33   mins   Total Treatment:     43   mins    Joanie Petersen, PT  [place] License: [id number]

## 2023-04-24 ENCOUNTER — TREATMENT (OUTPATIENT)
Dept: PHYSICAL THERAPY | Facility: CLINIC | Age: 68
End: 2023-04-24
Payer: COMMERCIAL

## 2023-04-24 DIAGNOSIS — R29.898 WEAKNESS OF RIGHT UPPER EXTREMITY: ICD-10-CM

## 2023-04-24 DIAGNOSIS — M25.611 DECREASED RIGHT SHOULDER RANGE OF MOTION: ICD-10-CM

## 2023-04-24 DIAGNOSIS — Z98.890 S/P RIGHT ROTATOR CUFF REPAIR: Primary | ICD-10-CM

## 2023-04-26 NOTE — PROGRESS NOTES
Physical Therapy Daily Treatment Note      Patient: Sera Salas   : 1955  Referring practitioner: Zachary William MD  Date of Initial Visit: Type: THERAPY  Noted: 3/10/2023  Today's Date: 2023  Patient seen for 14 sessions       Visit Diagnoses:    ICD-10-CM ICD-9-CM   1. S/P right rotator cuff repair  Z98.890 V45.89   2. Decreased right shoulder range of motion  M25.611 719.51   3. Weakness of right upper extremity  R29.898 729.89       Subjective   Pt states her shoulder was sore after starting new exercises last visit.     Objective   See Exercise, Manual, and Modality Logs for complete treatment.       Assessment/Plan    Subjectively, pt reports no increase of pain or discomfort with interventions performed today. Decreased repetitions of exercise, better tolerated today.         Timed:         Manual Therapy:    12     mins  99088;     Therapeutic Exercise:    8     mins  28735;     Neuromuscular Sasha:    0    mins  21971;    Therapeutic Activity:     10     mins  01692;     Gait Trainin     mins  01970;     Ultrasound:     0     mins  59078;    Ionto                               0    mins   16373  Self Care                       0     mins   90390  Canalith Repos    0     mins 61820      Un-Timed:  Electrical Stimulation:    0     mins  80009 ( );  Dry Needling     0     mins self-pay  Traction     0     mins 66464      Timed Treatment:   30   mins   Total Treatment:     40   mins    Joanie Petersen, PT  [place] License: [id number]

## 2023-04-28 ENCOUNTER — TREATMENT (OUTPATIENT)
Dept: PHYSICAL THERAPY | Facility: CLINIC | Age: 68
End: 2023-04-28
Payer: COMMERCIAL

## 2023-04-28 DIAGNOSIS — M25.611 DECREASED RIGHT SHOULDER RANGE OF MOTION: ICD-10-CM

## 2023-04-28 DIAGNOSIS — R29.898 WEAKNESS OF RIGHT UPPER EXTREMITY: ICD-10-CM

## 2023-04-28 DIAGNOSIS — Z98.890 S/P RIGHT ROTATOR CUFF REPAIR: Primary | ICD-10-CM

## 2023-05-01 ENCOUNTER — TREATMENT (OUTPATIENT)
Dept: PHYSICAL THERAPY | Facility: CLINIC | Age: 68
End: 2023-05-01
Payer: COMMERCIAL

## 2023-05-01 DIAGNOSIS — R29.898 WEAKNESS OF RIGHT UPPER EXTREMITY: ICD-10-CM

## 2023-05-01 DIAGNOSIS — Z98.890 S/P RIGHT ROTATOR CUFF REPAIR: Primary | ICD-10-CM

## 2023-05-01 DIAGNOSIS — M25.611 DECREASED RIGHT SHOULDER RANGE OF MOTION: ICD-10-CM

## 2023-05-01 NOTE — PROGRESS NOTES
Physical Therapy Daily Treatment Note      Patient: Sera Salas   : 1955  Referring practitioner: Zachary William MD  Date of Initial Visit: Type: THERAPY  Noted: 3/10/2023  Today's Date: 2023  Patient seen for 16 sessions       Visit Diagnoses:    ICD-10-CM ICD-9-CM   1. S/P right rotator cuff repair  Z98.890 V45.89   2. Decreased right shoulder range of motion  M25.611 719.51   3. Weakness of right upper extremity  R29.898 729.89       Subjective   Pt reports sharp pain in the front of her shoulder after doing her exercises on Saturday that lasted throughout the evening. States she took her time with exercises on  and her shoulder felt better.    Objective   See Exercise, Manual, and Modality Logs for complete treatment.       Assessment/Plan    Flexion PROM is nearing WNL, abduction is improving, ER is most challenging at this point.         Timed:         Manual Therapy:    10     mins  49060;     Therapeutic Exercise:    10     mins  58727;     Neuromuscular Sasha:    0    mins  78128;    Therapeutic Activity:     10     mins  33344;     Gait Trainin     mins  95347;     Ultrasound:     0     mins  03901;    Ionto                               0    mins   70759  Self Care                       0     mins   13018  Canalith Repos    0     mins 55343      Un-Timed:  Electrical Stimulation:    0     mins  65218 ( );  Dry Needling     0     mins self-pay  Traction     0     mins 09408      Timed Treatment:   30   mins   Total Treatment:     40   mins    Joanie Petersen, PT  [place] License: [id number]

## 2023-05-01 NOTE — PROGRESS NOTES
Physical Therapy Daily Treatment Note      Patient: Sera Salas   : 1955  Referring practitioner: Zachary William MD  Date of Initial Visit: Type: THERAPY  Noted: 3/10/2023  Today's Date: 2023  Patient seen for 15 sessions       Visit Diagnoses:    ICD-10-CM ICD-9-CM   1. S/P right rotator cuff repair  Z98.890 V45.89   2. Decreased right shoulder range of motion  M25.611 719.51   3. Weakness of right upper extremity  R29.898 729.89       Subjective   Pt reports much less soreness after last visit.     Objective   See Exercise, Manual, and Modality Logs for complete treatment.       Assessment/Plan    Subjectively, pt reports no increase of pain or discomfort with interventions performed today. ROM is progressing very well, with smooth joint motion.         Timed:         Manual Therapy:    10     mins  83557;     Therapeutic Exercise:    10     mins  24666;     Neuromuscular Sasha:    0    mins  02698;    Therapeutic Activity:     10     mins  10819;     Gait Trainin     mins  62531;     Ultrasound:     0     mins  02332;    Ionto                               0    mins   03495  Self Care                       0     mins   94768  Canalith Repos    0     mins 50239      Un-Timed:  Electrical Stimulation:    0     mins  48935 (MC );  Dry Needling     0     mins self-pay  Traction     0     mins 63297      Timed Treatment:   30   mins   Total Treatment:     40   mins    Joanie Petersen, PT  [place] License: [id number]

## 2023-05-05 ENCOUNTER — TREATMENT (OUTPATIENT)
Dept: PHYSICAL THERAPY | Facility: CLINIC | Age: 68
End: 2023-05-05
Payer: COMMERCIAL

## 2023-05-05 DIAGNOSIS — Z98.890 S/P RIGHT ROTATOR CUFF REPAIR: Primary | ICD-10-CM

## 2023-05-05 DIAGNOSIS — M25.611 DECREASED RIGHT SHOULDER RANGE OF MOTION: ICD-10-CM

## 2023-05-05 DIAGNOSIS — R29.898 WEAKNESS OF RIGHT UPPER EXTREMITY: ICD-10-CM

## 2023-05-05 NOTE — PROGRESS NOTES
Physical Therapy Daily Treatment Note      Patient: Sera Salas   : 1955  Referring practitioner: Zachary William MD  Date of Initial Visit: Type: THERAPY  Noted: 3/10/2023  Today's Date: 2023  Patient seen for 17 sessions       Visit Diagnoses:    ICD-10-CM ICD-9-CM   1. S/P right rotator cuff repair  Z98.890 V45.89   2. Decreased right shoulder range of motion  M25.611 719.51   3. Weakness of right upper extremity  R29.898 729.89       Subjective   Pt states her shoulder has felt really good this week.     Objective   See Exercise, Manual, and Modality Logs for complete treatment.       Assessment/Plan    Subjectively, pt reports no increase of pain or discomfort with interventions performed today. Will progress ROM activities next week.       Timed:         Manual Therapy:    10     mins  51394;     Therapeutic Exercise:    10     mins  51369;     Neuromuscular Sasha:    0    mins  43623;    Therapeutic Activity:     10     mins  41961;     Gait Trainin     mins  55361;     Ultrasound:     0     mins  78894;    Ionto                               0    mins   77362  Self Care                       0     mins   85694  Canalith Repos    0     mins 67438      Un-Timed:  Electrical Stimulation:    0     mins  66566 (MC );  Dry Needling     0     mins self-pay  Traction     0     mins 74002      Timed Treatment:   30   mins   Total Treatment:     40   mins    Joanie Petersen, PT  [place] License: [id number]

## 2023-05-08 ENCOUNTER — TREATMENT (OUTPATIENT)
Dept: PHYSICAL THERAPY | Facility: CLINIC | Age: 68
End: 2023-05-08
Payer: COMMERCIAL

## 2023-05-08 DIAGNOSIS — Z98.890 S/P RIGHT ROTATOR CUFF REPAIR: Primary | ICD-10-CM

## 2023-05-08 DIAGNOSIS — R29.898 WEAKNESS OF RIGHT UPPER EXTREMITY: ICD-10-CM

## 2023-05-08 DIAGNOSIS — M25.611 DECREASED RIGHT SHOULDER RANGE OF MOTION: ICD-10-CM

## 2023-05-08 NOTE — PROGRESS NOTES
Physical Therapy Daily Treatment Note      Patient: Sera Salas   : 1955  Referring practitioner: Zachary William MD  Date of Initial Visit: Type: THERAPY  Noted: 3/10/2023  Today's Date: 2023  Patient seen for 18 sessions       Visit Diagnoses:    ICD-10-CM ICD-9-CM   1. S/P right rotator cuff repair  Z98.890 V45.89   2. Decreased right shoulder range of motion  M25.611 719.51   3. Weakness of right upper extremity  R29.898 729.89       Subjective   Shoulder is feeling pretty good, gets tired easily.     Objective   See Exercise, Manual, and Modality Logs for complete treatment.       Assessment/Plan    Subjectively, pt reports no increase of pain or discomfort with interventions performed today. Performed well with continued functional ROM interventions. Continues to demonstrate good tolerance to exercise progressions. Continues to benefit from verbal/tactile cues to ensure proper form and technique for exercise performance.        Timed:         Manual Therapy:    10     mins  78932;     Therapeutic Exercise:    10     mins  59098;     Neuromuscular Sasha:    0    mins  74823;    Therapeutic Activity:     10     mins  92613;     Gait Trainin     mins  71700;     Ultrasound:     0     mins  29030;    Ionto                               0    mins   78654  Self Care                       0     mins   82968  Canalith Repos    0     mins 08774      Un-Timed:  Electrical Stimulation:    0     mins  35916 ( );  Dry Needling     0     mins self-pay  Traction     0     mins 12622      Timed Treatment:   30   mins   Total Treatment:     40   mins    Joanie Petersen, PT  [place] License: [id number]

## 2023-05-12 ENCOUNTER — TREATMENT (OUTPATIENT)
Dept: PHYSICAL THERAPY | Facility: CLINIC | Age: 68
End: 2023-05-12
Payer: COMMERCIAL

## 2023-05-12 DIAGNOSIS — Z98.890 S/P RIGHT ROTATOR CUFF REPAIR: Primary | ICD-10-CM

## 2023-05-12 DIAGNOSIS — R29.898 WEAKNESS OF RIGHT UPPER EXTREMITY: ICD-10-CM

## 2023-05-12 DIAGNOSIS — M25.611 DECREASED RIGHT SHOULDER RANGE OF MOTION: ICD-10-CM

## 2023-05-12 NOTE — PROGRESS NOTES
Physical Therapy Re-certification of Plan of Care  Patient: Sera Salas   : 1955  Diagnosis/ICD-10 Code:  S/P right rotator cuff repair [Z98.890]  Referring practitioner: Zachary William MD  Date of Initial Visit: Type: THERAPY  Noted: 3/10/2023  Today's Date: 2023  Patient seen for 19 sessions         Visit Diagnoses:    ICD-10-CM ICD-9-CM   1. S/P right rotator cuff repair  Z98.890 V45.89   2. Decreased right shoulder range of motion  M25.611 719.51   3. Weakness of right upper extremity  R29.898 729.89         Sera Salas reports: her shoulder is feeling better, still feels weak but little to no pain.  Subjective Questionnaire: QuickDASH: 18% limitation (improved from 45% at last reassessment)  Clinical Progress: improved  Home Program Compliance: Yes  Treatment has included: therapeutic exercise, neuromuscular re-education, manual therapy, therapeutic activity and cryotherapy      Subjective       Objective          Active Range of Motion     Right Shoulder   Flexion: 158 (AAROM) degrees   Abduction: 124 (AAROM) degrees   External rotation 45°: 53 (AAROM) degrees     Passive Range of Motion     Right Shoulder   Flexion: 164 degrees   Abduction: 159 degrees           Assessment/Plan    Short Term Goals: 6 weeks. Patient will:  1. Be independent with initial HEP (MET)  2. Be instructed in posture and body mechanics. (MET)  3. Demonstrate full GH PROM to allow for progressing of therapy exercises. (PROGRESSING)    Long Term Goals: 12 weeks. Pt will:  1. Exhibit (R) shoulder AROM to WFL to allow for reaching overhead and out (ABD) without pain limiting function. (PROGRESSING)  2. Demonstrate improved Right UE MMT of >/= 4+/5 to allow for performance of ADL's/household management/recreational activities. (NOT MET)  3. Pt able to reach overhead and lift 10# to allow for return to doing home/yard/recreational activities with min to no pain. (NOT MET)  4. Report perceived disability </=10% based on  [place]mina (PROGRESSING)     Recommendations: Continue as planned per protocol  Timeframe: 3 months  Prognosis to achieve goals: good      Timed:         Manual Therapy:    10     mins  55235;     Therapeutic Exercise:    10     mins  53505;     Neuromuscular Sasha:    0    mins  34608;    Therapeutic Activity:     10     mins  17953;     Gait Trainin     mins  37283;     Ultrasound:     0     mins  64138;    Ionto                               0    mins   15449  Self Care                       0     mins   17006  Canalith Repos    0     mins 61923      Un-Timed:  Electrical Stimulation:    0     mins  32274 ( );  Dry Needling     0     mins self-pay  Traction     0     mins 37048  Re-Eval                           0    mins  82751      Timed Treatment:   30   mins   Total Treatment:     40   mins          PT: Joanie Petersen PT     [place] License:  [id number]    Electronically signed by Joanie Petersen PT, 23, 1:00 PM EDT    Certification Period: 2023 thru 2023  I certify that the therapy services are furnished while this patient is under my care.  The services outlined above are required by this patient, and will be reviewed every 90 days.         Physician Signature:__________________________________________________    PHYSICIAN: Zachary William MD  NPI: [id number]                                      DATE:  :     Please sign and return via fax to [email or web address] . Thank you, [facility]

## 2023-05-15 ENCOUNTER — TREATMENT (OUTPATIENT)
Dept: PHYSICAL THERAPY | Facility: CLINIC | Age: 68
End: 2023-05-15
Payer: COMMERCIAL

## 2023-05-15 DIAGNOSIS — R29.898 WEAKNESS OF RIGHT UPPER EXTREMITY: ICD-10-CM

## 2023-05-15 DIAGNOSIS — Z98.890 S/P RIGHT ROTATOR CUFF REPAIR: Primary | ICD-10-CM

## 2023-05-15 DIAGNOSIS — M25.611 DECREASED RIGHT SHOULDER RANGE OF MOTION: ICD-10-CM

## 2023-05-15 NOTE — PROGRESS NOTES
Physical Therapy Daily Treatment Note      Patient: Sera Salas   : 1955  Referring practitioner: Zachary William MD  Date of Initial Visit: Type: THERAPY  Noted: 3/10/2023  Today's Date: 5/15/2023  Patient seen for 20 sessions       Visit Diagnoses:    ICD-10-CM ICD-9-CM   1. S/P right rotator cuff repair  Z98.890 V45.89   2. Decreased right shoulder range of motion  M25.611 719.51   3. Weakness of right upper extremity  R29.898 729.89       Subjective   Pt states her pain is steadily improving, exercises are getting easier.     Objective   See Exercise, Manual, and Modality Logs for complete treatment.       Assessment/Plan    Subjectively, pt reports no increase of pain or discomfort with interventions performed today. Flexion and abduction PROM are WNL, IR/ER still lacking.         Timed:         Manual Therapy:    10     mins  66623;     Therapeutic Exercise:    10     mins  09360;     Neuromuscular Sasha:    0    mins  26264;    Therapeutic Activity:     10     mins  46479;     Gait Trainin     mins  91779;     Ultrasound:     0     mins  18680;    Ionto                               0    mins   60854  Self Care                       0     mins   58269  Canalith Repos    0     mins 00520      Un-Timed:  Electrical Stimulation:    0     mins  42223 ( );  Dry Needling     0     mins self-pay  Traction     0     mins 18954      Timed Treatment:   30   mins   Total Treatment:     40   mins    Joanie Petersen, PT  [place] License: [id number]

## 2023-05-19 ENCOUNTER — TREATMENT (OUTPATIENT)
Dept: PHYSICAL THERAPY | Facility: CLINIC | Age: 68
End: 2023-05-19
Payer: COMMERCIAL

## 2023-05-19 DIAGNOSIS — R29.898 WEAKNESS OF RIGHT UPPER EXTREMITY: ICD-10-CM

## 2023-05-19 DIAGNOSIS — M25.611 DECREASED RIGHT SHOULDER RANGE OF MOTION: ICD-10-CM

## 2023-05-19 DIAGNOSIS — Z98.890 S/P RIGHT ROTATOR CUFF REPAIR: Primary | ICD-10-CM

## 2023-05-19 NOTE — PROGRESS NOTES
Physical Therapy Daily Treatment Note      Patient: Sera Salas   : 1955  Referring practitioner: Zachary William MD  Date of Initial Visit: Type: THERAPY  Noted: 3/10/2023  Today's Date: 2023  Patient seen for 21 sessions       Visit Diagnoses:    ICD-10-CM ICD-9-CM   1. S/P right rotator cuff repair  Z98.890 V45.89   2. Decreased right shoulder range of motion  M25.611 719.51   3. Weakness of right upper extremity  R29.898 729.89       Subjective   Shoulder is feeling good.     Objective   See Exercise, Manual, and Modality Logs for complete treatment.       Assessment/Plan    Subjectively, pt reports no increase of pain or discomfort with interventions performed today. Performed well with continued functional strengthening interventions. Continues to demonstrate good tolerance to exercise progressions. Continues to benefit from verbal/tactile cues to ensure proper form and technique for exercise performance.        Timed:         Manual Therapy:    10     mins  43811;     Therapeutic Exercise:    10     mins  35445;     Neuromuscular Sasha:    0    mins  59440;    Therapeutic Activity:     10     mins  47824;     Gait Trainin     mins  68511;     Ultrasound:     0     mins  67840;    Ionto                               0    mins   78877  Self Care                       0     mins   27990  Canalith Repos    0     mins 68012      Un-Timed:  Electrical Stimulation:    0     mins  37955 ( );  Dry Needling     0     mins self-pay  Traction     0     mins 97293      Timed Treatment:   30   mins   Total Treatment:     40   mins    Joanie Petersen, PT  [place] License: [id number]

## 2023-05-22 ENCOUNTER — TREATMENT (OUTPATIENT)
Dept: PHYSICAL THERAPY | Facility: CLINIC | Age: 68
End: 2023-05-22

## 2023-05-22 ENCOUNTER — OFFICE VISIT (OUTPATIENT)
Dept: ORTHOPEDIC SURGERY | Facility: CLINIC | Age: 68
End: 2023-05-22
Payer: COMMERCIAL

## 2023-05-22 VITALS — BODY MASS INDEX: 26.46 KG/M2 | TEMPERATURE: 97.8 F | HEIGHT: 64 IN | WEIGHT: 155 LBS

## 2023-05-22 DIAGNOSIS — M25.611 DECREASED RIGHT SHOULDER RANGE OF MOTION: ICD-10-CM

## 2023-05-22 DIAGNOSIS — R29.898 WEAKNESS OF RIGHT UPPER EXTREMITY: ICD-10-CM

## 2023-05-22 DIAGNOSIS — Z09 SURGERY FOLLOW-UP: Primary | ICD-10-CM

## 2023-05-22 DIAGNOSIS — Z98.890 S/P RIGHT ROTATOR CUFF REPAIR: Primary | ICD-10-CM

## 2023-05-22 NOTE — PROGRESS NOTES
Physical Therapy Daily Treatment Note      Patient: Sera Salas   : 1955  Referring practitioner: Zachary William MD  Date of Initial Visit: Type: THERAPY  Noted: 3/10/2023  Today's Date: 2023  Patient seen for 22 sessions       Visit Diagnoses:    ICD-10-CM ICD-9-CM   1. S/P right rotator cuff repair  Z98.890 V45.89   2. Decreased right shoulder range of motion  M25.611 719.51   3. Weakness of right upper extremity  R29.898 729.89       Subjective   Pt sees MD after today's session. Hopes to go back to work tomorrow.     Objective   See Exercise, Manual, and Modality Logs for complete treatment.       Assessment/Plan    Subjectively, pt reports no increase of pain or discomfort with interventions performed today. Performed well with continued functional strengthening interventions. Continues to demonstrate good tolerance to exercise progressions. Continues to benefit from verbal/tactile cues to ensure proper form and technique for exercise performance.        Timed:         Manual Therapy:    10     mins  18253;     Therapeutic Exercise:    10     mins  36143;     Neuromuscular aSsha:    0    mins  05551;    Therapeutic Activity:     10     mins  86184;     Gait Trainin     mins  34157;     Ultrasound:     0     mins  79746;    Ionto                               0    mins   75090  Self Care                       0     mins   13518  Canalith Repos    0     mins 47526      Un-Timed:  Electrical Stimulation:    0     mins  16486 ( );  Dry Needling     0     mins self-pay  Traction     0     mins 74922      Timed Treatment:   30   mins   Total Treatment:     40   mins    Joanie Petersen, PT  [place] License: [id number]

## 2023-05-22 NOTE — LETTER
May 22, 2023     Patient: Sera Salas   YOB: 1955   Date of Visit: 5/22/2023       To Whom It May Concern:    It is my medical opinion that Sera Salas may return to light duty immediately with the following restrictions: no overhead activity or lifting greater than 5 lbs.  No repetitive use of right upper extremity .           Sincerely,        Zachary William MD    CC:   No Recipients

## 2023-05-22 NOTE — PROGRESS NOTES
Sera Salas : 1955 MRN: [id number] DATE: 2023     CC: 3 months s/p right shoulder arthroscopy with rotator cuff repair, biceps tenodesis    HPI: Pt. returns to clinic today for follow up.  Patient reports significant improvement since last visit.  Motion is steadily getting better with therapy.  Pain is fairly minimal at this point.  No complaints.    Vitals:    23 1525   Temp: 97.8 °F (36.6 °C)       Exam:  Wounds appear well-healed.  Arm and forearm soft.  Shoulder motion is improved: patient is still lacking several levels of internal rotation but elevation, abduction and external rotation are all within 5 to 10 degrees of the contralateral side.  Good motor and sensory function in the hand and wrist.  Palpable radial pulse with good cap refill.      Impression: 3-months s/p right shoulder rotator cuff repair, biceps tenodesis    Plan:    1.  Continue PT per protocol.  2.  Can begin strengthening program at this time  3.  Follow up in 6 weeks for reevaluation  4.  Counseled the patient about appropriate activity modifications and restrictions.    Zachary William MD    2023

## 2023-05-24 ENCOUNTER — TREATMENT (OUTPATIENT)
Dept: PHYSICAL THERAPY | Facility: CLINIC | Age: 68
End: 2023-05-24
Payer: COMMERCIAL

## 2023-05-24 DIAGNOSIS — R29.898 WEAKNESS OF RIGHT UPPER EXTREMITY: ICD-10-CM

## 2023-05-24 DIAGNOSIS — M25.611 DECREASED RIGHT SHOULDER RANGE OF MOTION: ICD-10-CM

## 2023-05-24 DIAGNOSIS — Z98.890 S/P RIGHT ROTATOR CUFF REPAIR: Primary | ICD-10-CM

## 2023-05-31 ENCOUNTER — TREATMENT (OUTPATIENT)
Dept: PHYSICAL THERAPY | Facility: CLINIC | Age: 68
End: 2023-05-31
Payer: COMMERCIAL

## 2023-05-31 DIAGNOSIS — M25.611 DECREASED RIGHT SHOULDER RANGE OF MOTION: ICD-10-CM

## 2023-05-31 DIAGNOSIS — Z98.890 S/P RIGHT ROTATOR CUFF REPAIR: Primary | ICD-10-CM

## 2023-05-31 DIAGNOSIS — R29.898 WEAKNESS OF RIGHT UPPER EXTREMITY: ICD-10-CM

## 2023-06-02 NOTE — PROGRESS NOTES
Physical Therapy Daily Treatment Note      Patient: Sera Salas   : 1955  Referring practitioner: Zachary William MD  Date of Initial Visit: Type: THERAPY  Noted: 3/10/2023  Today's Date: 2023  Patient seen for 23 sessions       Visit Diagnoses:    ICD-10-CM ICD-9-CM   1. S/P right rotator cuff repair  Z98.890 V45.89   2. Decreased right shoulder range of motion  M25.611 719.51   3. Weakness of right upper extremity  R29.898 729.89        Subjective   Pt states she saw MD on Monday, is pleased with progress. She returned to work yesterday with restrictions.     Objective   See Exercise, Manual, and Modality Logs for complete treatment.       Assessment/Plan    Subjectively, pt reports no increase of pain or discomfort with interventions performed today. Performed well with continued functional strengthening interventions. Continues to demonstrate good tolerance to exercise progressions. Continues to benefit from verbal/tactile cues to ensure proper form and technique for exercise performance.        Timed:         Manual Therapy:    10     mins  65096;     Therapeutic Exercise:    10     mins  41223;     Neuromuscular Sasha:    0    mins  79068;    Therapeutic Activity:     10     mins  07339;     Gait Trainin     mins  23845;     Ultrasound:     0     mins  72991;    Ionto                               0    mins   31104  Self Care                       0     mins   13833  Canalith Repos    0     mins 27460      Un-Timed:  Electrical Stimulation:    0     mins  29645 ( );  Dry Needling     0     mins self-pay  Traction     0     mins 54252      Timed Treatment:   30   mins   Total Treatment:     40   mins    Joanie Petersen, PT  [place] License: [id number]

## 2023-06-05 ENCOUNTER — TREATMENT (OUTPATIENT)
Dept: PHYSICAL THERAPY | Facility: CLINIC | Age: 68
End: 2023-06-05
Payer: COMMERCIAL

## 2023-06-05 DIAGNOSIS — R29.898 WEAKNESS OF RIGHT UPPER EXTREMITY: ICD-10-CM

## 2023-06-05 DIAGNOSIS — Z98.890 S/P RIGHT ROTATOR CUFF REPAIR: Primary | ICD-10-CM

## 2023-06-05 DIAGNOSIS — M25.611 DECREASED RIGHT SHOULDER RANGE OF MOTION: ICD-10-CM

## 2023-06-05 NOTE — PROGRESS NOTES
Physical Therapy Daily Treatment Note      Patient: Sera Salas   : 1955  Referring practitioner: Zachary William MD  Date of Initial Visit: Type: THERAPY  Noted: 3/10/2023  Today's Date: 2023  Patient seen for 24 sessions       Visit Diagnoses:    ICD-10-CM ICD-9-CM   1. S/P right rotator cuff repair  Z98.890 V45.89   2. Decreased right shoulder range of motion  M25.611 719.51   3. Weakness of right upper extremity  R29.898 729.89       Subjective   Pt states she is doing well with return to work. Still feels that her shoulder is weak.     Objective   See Exercise, Manual, and Modality Logs for complete treatment.       Assessment/Plan    Subjectively, pt reports no increase of pain or discomfort with interventions performed today. Performed well with continued functional strengthening interventions. Continues to demonstrate good tolerance to exercise progressions. Continues to benefit from verbal/tactile cues to ensure proper form and technique for exercise performance.        Timed:         Manual Therapy:    10     mins  40359;     Therapeutic Exercise:    10     mins  46339;     Neuromuscular Sasha:    0    mins  11513;    Therapeutic Activity:     10     mins  75697;     Gait Trainin     mins  92065;     Ultrasound:     0     mins  39959;    Ionto                               0    mins   29464  Self Care                       0     mins   49528  Canalith Repos    0     mins 40662      Un-Timed:  Electrical Stimulation:    0     mins  21215 ( );  Dry Needling     0     mins self-pay  Traction     0     mins 71342      Timed Treatment:   30   mins   Total Treatment:     40   mins    Joanie Petersen, PT  [place] License: [id number]

## 2023-06-05 NOTE — PROGRESS NOTES
Physical Therapy Daily Treatment Note      Patient: Sera Salas   : 1955  Referring practitioner: Zachary William MD  Date of Initial Visit: Type: THERAPY  Noted: 3/10/2023  Today's Date: 2023  Patient seen for 25 sessions       Visit Diagnoses:    ICD-10-CM ICD-9-CM   1. S/P right rotator cuff repair  Z98.890 V45.89   2. Decreased right shoulder range of motion  M25.611 719.51   3. Weakness of right upper extremity  R29.898 729.89       Subjective   Pt reports some soreness in top of shoulder.     Objective   See Exercise, Manual, and Modality Logs for complete treatment.       Assessment/Plan    Subjectively, pt reports no increase of pain or discomfort with interventions performed today. Performed well with continued functional strengthening interventions. Continues to demonstrate good tolerance to exercise progressions. Continues to benefit from verbal/tactile cues to ensure proper form and technique for exercise performance, educated regarding avoiding compensation with upper trap.        Timed:         Manual Therapy:    10     mins  99157;     Therapeutic Exercise:    10     mins  99734;     Neuromuscular Sasha:    0    mins  45860;    Therapeutic Activity:     10     mins  36492;     Gait Trainin     mins  68971;     Ultrasound:     0     mins  75471;    Ionto                               0    mins   92678  Self Care                       0     mins   33696  Canalith Repos    0     mins 61913      Un-Timed:  Electrical Stimulation:    0     mins  62068 ( );  Dry Needling     0     mins self-pay  Traction     0     mins 28733      Timed Treatment:   30   mins   Total Treatment:     40   mins    Joanie Petersen, PT  [place] License: [id number]                   Action 1: Continue Hide Aquaphor Products: No Plan: Margins clear Detail Level: Zone Continue Regimen: Clindamycin. Will visit other options when finished breast feeding

## 2023-06-07 ENCOUNTER — TREATMENT (OUTPATIENT)
Dept: PHYSICAL THERAPY | Facility: CLINIC | Age: 68
End: 2023-06-07
Payer: COMMERCIAL

## 2023-06-07 DIAGNOSIS — M25.611 DECREASED RIGHT SHOULDER RANGE OF MOTION: ICD-10-CM

## 2023-06-07 DIAGNOSIS — Z98.890 S/P RIGHT ROTATOR CUFF REPAIR: Primary | ICD-10-CM

## 2023-06-07 DIAGNOSIS — R29.898 WEAKNESS OF RIGHT UPPER EXTREMITY: ICD-10-CM

## 2023-06-07 NOTE — PROGRESS NOTES
Physical Therapy Daily Treatment Note      Patient: Sera Salas   : 1955  Referring practitioner: Zachary William MD  Date of Initial Visit: Type: THERAPY  Noted: 3/10/2023  Today's Date: 2023  Patient seen for 26 sessions       Visit Diagnoses:    ICD-10-CM ICD-9-CM   1. S/P right rotator cuff repair  Z98.890 V45.89   2. Decreased right shoulder range of motion  M25.611 719.51   3. Weakness of right upper extremity  R29.898 729.89       Subjective   No new concerns.    Objective   See Exercise, Manual, and Modality Logs for complete treatment.       Assessment/Plan    Subjectively, pt reports no increase of pain or discomfort with interventions performed today. Performed well with continued functional strengthening interventions. Continues to benefit from verbal/tactile cues to ensure proper form and technique for exercise performance. Fatigues easily but able to progress exercises.         Timed:         Manual Therapy:    10     mins  16232;     Therapeutic Exercise:    10     mins  15999;     Neuromuscular Sasha:    0    mins  25799;    Therapeutic Activity:     10     mins  80445;     Gait Trainin     mins  94532;     Ultrasound:     0     mins  36334;    Ionto                               0    mins   27397  Self Care                       0     mins   93858  Canalith Repos    0     mins 33913      Un-Timed:  Electrical Stimulation:    0     mins  02119 ( );  Dry Needling     0     mins self-pay  Traction     0     mins 60941      Timed Treatment:   30   mins   Total Treatment:     40   mins    Joanie Petersen, PT  [place] License: [id number]

## 2023-06-12 ENCOUNTER — TREATMENT (OUTPATIENT)
Dept: PHYSICAL THERAPY | Facility: CLINIC | Age: 68
End: 2023-06-12
Payer: COMMERCIAL

## 2023-06-12 DIAGNOSIS — M25.611 DECREASED RIGHT SHOULDER RANGE OF MOTION: ICD-10-CM

## 2023-06-12 DIAGNOSIS — Z98.890 S/P RIGHT ROTATOR CUFF REPAIR: Primary | ICD-10-CM

## 2023-06-12 DIAGNOSIS — R29.898 WEAKNESS OF RIGHT UPPER EXTREMITY: ICD-10-CM

## 2023-06-12 PROCEDURE — 97530 THERAPEUTIC ACTIVITIES: CPT | Performed by: PHYSICAL THERAPIST

## 2023-06-12 PROCEDURE — 97140 MANUAL THERAPY 1/> REGIONS: CPT | Performed by: PHYSICAL THERAPIST

## 2023-06-12 PROCEDURE — 97110 THERAPEUTIC EXERCISES: CPT | Performed by: PHYSICAL THERAPIST

## 2023-06-12 NOTE — PROGRESS NOTES
Physical Therapy Progress Note  Patient: Sera Salas   : 1955  Diagnosis/ICD-10 Code:  S/P right rotator cuff repair [Z98.890]  Referring practitioner: Zachary William MD  Date of Initial Visit: Type: THERAPY  Noted: 3/10/2023  Today's Date: 2023  Patient seen for 27 sessions         Visit Diagnoses:    ICD-10-CM ICD-9-CM   1. S/P right rotator cuff repair  Z98.890 V45.89   2. Decreased right shoulder range of motion  M25.611 719.51   3. Weakness of right upper extremity  R29.898 729.89         Sera Salas reports: overall doing very well. Little to no pain. Motion feels good, still feels weak. Shoulder gets tired easily.   Subjective Questionnaire: QuickDASH: 6.82% limitation  Clinical Progress: improved  Home Program Compliance: Yes  Treatment has included: therapeutic exercise, neuromuscular re-education, manual therapy, therapeutic activity and cryotherapy      Subjective       Objective          Active Range of Motion     Right Shoulder   Flexion: 161 degrees   Abduction: 162 degrees   External rotation BTH: C7   Internal rotation BTB: L5           Assessment/Plan    Short Term Goals: 6 weeks. Patient will:  1. Be independent with initial HEP (MET)  2. Be instructed in posture and body mechanics. (MET)  3. Demonstrate full GH PROM to allow for progressing of therapy exercises (FLEXION, ABDUCTION, ER MET, IR PROGRESSING)    Long Term Goals: 12 weeks. Pt will:  1. Exhibit (R) shoulder AROM to WFL to allow for reaching overhead and out (ABD) without pain limiting function. (MET)  2. Demonstrate improved Right UE MMT of >/= 4+/5 to allow for performance of ADL's/household management/recreational activities. (NOT MET)  3. Pt able to reach overhead and lift 10# to allow for return to doing home/yard/recreational activities with min to no pain. (NOT MET)  4. Report perceived disability </=10% based on QuickDASH (MET)      Recommendations: Continue as planned  Timeframe: 1 month  Prognosis to achieve  goals: good      Timed:         Manual Therapy:    10     mins  55922;     Therapeutic Exercise:    10     mins  11507;     Neuromuscular [place]:    0    mins  61267;    Therapeutic Activity:     10     mins  46107;     Gait Trainin     mins  96239;     Ultrasound:     0     mins  90493;    Ionto                               0    mins   85939  Self Care                       0     mins   97457  Canalith Repos    0     mins 63524      Un-Timed:  Electrical Stimulation:    0     mins  56812 ( );  Dry Needling     0     mins self-pay  Traction     0     mins 73140  Re-Eval                           0    mins  48722      Timed Treatment:   30   mins   Total Treatment:     40   mins          PT: Joanie Petersen, PT     [place] License:  [id number]    Electronically signed by Joanie Petersen, PT, 23, 3:43 PM EDT

## 2023-07-05 PROBLEM — E21.5 PARATHYROID GLAND DISORDER: Status: ACTIVE | Noted: 2023-07-05

## 2023-08-21 ENCOUNTER — HOSPITAL ENCOUNTER (OUTPATIENT)
Dept: BONE DENSITY | Facility: HOSPITAL | Age: 68
Discharge: HOME | End: 2023-08-21
Admitting: INTERNAL MEDICINE
Payer: COMMERCIAL

## 2023-08-21 DIAGNOSIS — E21.5 PARATHYROID GLAND DISORDER: ICD-10-CM

## 2023-08-21 PROCEDURE — 77080 DXA BONE DENSITY AXIAL: CPT

## 2023-08-29 NOTE — PROGRESS NOTES
REASON FOR FOLLOW-UP: Acute left lower extremity DVT    History of Present Illness   The patient is a 67-year-old female followed by primary care with hypercalcemia (hyperparathyroidism), glucose intolerance, asthma, hyperlipidemia, hypertension, and coronary artery disease as well as chronic back pain as well as coronary artery disease.  She was seen by cardiology 7/22/2021 having undergone stress testing and was felt to be stable.    She had follow-up with ENT 9/24 having undergone a hemithyroidectomy with removal of this  portion of the left thyroid lobe, parathyroidectomy for parathyroid adenoma.  The pathology is not immediately available for review.  The patient states she is also undergoing assessment for hearing loss by Dr. Jeevan Sharma with an MRI plan to rule out acoustic neuroma.  Additionally she has had previous colonoscopy in the last several years without abnormalities noted.         Following her surgery she had left calf pain for approximately week and was seen at [facility] 10/1/2021 with evidence of acute, occlusive DVT in the popliteal, posterior tibial and peroneal veins.  The patient states that this DVT development began 1 day post surgery.  She had been without any discomfort or swelling in the extremity prior to the procedure and compression devices were used perioperatively.  She was placed on Xarelto starter pack and has since transitioned to 20 mg daily.  She states, at the time of this consultation, that she no longer is swelling in the extremity and feels well.  There is no family history that she is aware of any thrombotic tendencies.    Please note that subsequent history taken from the patient indicate that her brother had a similar issue per thrombosis bringing up the clear possibility of an inherited tendency for thrombosis    The patient returns for testing obtained 11/16/2021 with heterozygous findings for factor V Leiden, no prothrombin gene mutation, negative lupus  anticoagulant testing, lupus anticoagulant found to be positive, beta-2 glycoprotein studies negative, Factor VIII of 116, normal homocysteine.  The patient indicates that she has been advised by her ENT physician that she remains hypercalcemic, unexpectedly so, may require more surgery,    The patient is next seen 3/4/2022 indicating that insurance company would not cover her factor V Leiden prothrombin gene mutation which by history were of concern (factor V Leiden found to be heterozygous positive).  Of greater concern would be a consistently positive lupus anticoagulant though the patient has clearly responded to her Xarelto therapy with subsequent Doppler examination 2/18/2021 demonstrating improvement in only chronic left lower thrombosis in the popliteal noted.  Again this is evidence the patient is being properly treated for her factor V Leiden mutation and subsequent thrombosis.    The patient is next seen in office 12/19/2022 indicating that she is undergoing additional surgery via ENT for removal of tissue related to her goiter.  Unfortunately we are unable to review those results though a CTA of chest 6/21/2022 when she presented with left sided pleuritic chest pain reveals a large left paratracheal lesion that was unchanged from 6/9/2022 and was felt to be either metastatic lymphadenopathy or intrathoracic goiter.  The patient held her Xarelto dosing perioperatively and, fortunately, did not subsequently have any thrombotic complications.    The patient is next seen 6/26/2023 having developed swelling in her right lower extremity that been progressively worsening.  She has been evaluated by orthopedics and felt to have ligamental weakening in her right knee though she indicates both the upper calf region and the knee have slowly been enlarging in size.  Considering her history and low-dose anticoagulant we have discussed that a reassessment via repeat Doppler examination of her lower extremities.    The  patient is seen 2023.  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    The patient has continued her prophylactic anticoagulation and states that she has been unable to complete surgical therapy for hyperparathyroidism as result of surgical bleeding.    Patient is seen back with plans to see her ENT in the near future.  A follow-up bone density was obtained 2023 which demonstrates modest worsening in terms of developing osteopenia as compared to previous.  She has had no issues with thrombotic or hemorrhagic complications on low-dose Xarelto which she is willing to continue.  Please note that also in the interval since she was last seen she underwent repair of right rotator cuff successfully without any hemorrhagic or thrombotic complication.        Past Medical History:   Diagnosis Date    Allergic     Anxiety     Arthritis     Asthma     Bleeding disorder     no fefinitive diagnosis  hematologist following and needs further testing    CAD (coronary artery disease)     Disease of thyroid gland     DVT, lower extremity, distal, acute, unspecified laterality     on med    Frequent UTI     wears pads    H/O bone density study DUE    H/O complete eye exam DUE    HL (hearing loss) may 2021    Hx of benign neoplasm of parathyroid gland     left side    Hyperlipidemia     Hypertension     On continuous oral anticoagulation     xarelto    Parathyroid gland disorder     has 1parathyroid with mass and is waiting gor surgery to remove    Seasonal allergies     Shoulder pain     right rotator cuff tear        Past Surgical History:   Procedure Laterality Date     SECTION  ,     COLONOSCOPY  2002    MAMMO BILATERAL  DUE    PAP SMEAR  DUE    PARATHYROIDECTOMY Left     x1    SHOULDER ARTHROSCOPY W/ ROTATOR CUFF REPAIR Right 2023    Procedure: SHOULDER ARTHROSCOPY WITH ROTATOR CUFF REPAIR,  INJECTION RIGHT KNEE;  Surgeon: Zachary William MD;  [facility];  Service: Orthopedics;  Laterality: Right;    THYROID SURGERY Left     partial    TUBAL ABDOMINAL LIGATION          Current Outpatient Medications on File Prior to Visit   Medication Sig Dispense Refill    acetaminophen (TYLENOL) 325 MG tablet Take 2 tablets by mouth 2 (Two) Times a Day As Needed for Mild Pain. 60 tablet 0    albuterol (PROVENTIL HFA;VENTOLIN HFA) 108 (90 BASE) MCG/ACT inhaler Inhale 2 puffs Every 4 (Four) Hours As Needed for Wheezing.      amLODIPine (NORVASC) 2.5 MG tablet Take 1 tablet by mouth Daily. (Patient taking differently: Take 1 tablet by mouth Every Morning.) 90 tablet 3    azelastine (ASTELIN) 0.1 % nasal spray 2 sprays into the nostril(s) as directed by provider 2 (Two) Times a Day. Use in each nostril as directed      cephalexin (KEFLEX) 250 MG capsule Take 1 capsule by mouth every night at bedtime.      cetirizine (ZyrTEC) 10 MG tablet Take 1 tablet by mouth Every Morning.      docusate sodium (COLACE) 100 MG capsule Take 1 capsule by mouth 2 (Two) Times a Day. 60 capsule 0    estradiol (ESTRACE) 0.1 MG/GM vaginal cream APPLY 1 GM VAGINALLY TWICE A WEEK      fenofibrate 160 MG tablet Take 1 tablet by mouth Daily. (Patient taking differently: Take 1 tablet by mouth Every Morning.) 90 tablet 3    fluticasone-salmeterol (ADVAIR HFA) 230-21 MCG/ACT inhaler Inhale 2 puffs 2 (Two) Times a Day.      HYDROcodone-acetaminophen (NORCO) 7.5-325 MG per tablet Take 1 tablet by mouth Every 4 (Four) Hours As Needed for Moderate Pain (Pain). 42 tablet 0    losartan-hydrochlorothiazide (HYZAAR) 50-12.5 MG per tablet Take 1 tablet by mouth Daily. (Patient taking differently: Take 1 tablet by mouth Every Morning.) 90 tablet 3    montelukast (SINGULAIR) 10 MG tablet Take 1 tablet by mouth Every Morning.      Myrbetriq 25 MG tablet sustained-release 24 hour 24 hr tablet Take 1 tablet by mouth every night at bedtime.       "nitrofurantoin, macrocrystal-monohydrate, (MACROBID) 100 MG capsule       ondansetron (Zofran) 4 MG tablet Take 1 tablet by mouth Every 8 (Eight) Hours As Needed for Nausea or Vomiting. 30 tablet 0    rivaroxaban (Xarelto) 10 MG tablet Take 1 tablet by mouth Daily. 90 tablet 3     No current facility-administered medications on file prior to visit.        ALLERGIES:  No Known Allergies     Social History     Socioeconomic History    Marital status:      Spouse name: Marlon    Number of children: 2    Years of education: High school   Tobacco Use    Smoking status: Never    Smokeless tobacco: Never    Tobacco comments:     caffeine use 1-2 sodas daily   Vaping Use    Vaping Use: Never used   Substance and Sexual Activity    Alcohol use: Never    Drug use: Never    Sexual activity: Yes     Birth control/protection: None        Family History   Problem Relation Age of Onset    Stroke Mother     Arthritis Mother     Heart disease Brother     Hypertension Brother     Diabetes Brother     Heart disease Brother     Heart disease Other     Hypertension Other     Stroke Other     Malig Hyperthermia Neg Hx         Review of Systems   Constitutional:  Negative for activity change, fatigue, fever and unexpected weight change.   HENT:  Positive for tinnitus.         See history of present illness   Eyes: Negative.    Respiratory:  Positive for cough (Increased general secretions including mucus).    Cardiovascular: Negative.    Gastrointestinal: Negative.    Genitourinary: Negative.    Musculoskeletal: Negative.    Skin:  Negative for color change.   Allergic/Immunologic: Negative.    Neurological: Negative.    Psychiatric/Behavioral: Negative.        Objective     Vitals:    08/30/23 1545   BP: 134/75   Pulse: 71   Resp: 18   Temp: 98.2 øF (36.8 øC)   TempSrc: Temporal   SpO2: 96%   Weight: 71.2 kg (156 lb 14.4 oz)   Height: 163.8 cm (64.49\")   PainSc: 0-No pain         8/30/2023     3:53 PM   Current Status   ECOG score " 0       Physical Exam  Constitutional:       Appearance: Normal appearance.   HENT:      Nose: Nose normal.      Mouth/Throat:      Mouth: Mucous membranes are moist.      Pharynx: Oropharynx is clear.   Eyes:      Extraocular Movements: Extraocular movements intact.      Conjunctiva/sclera: Conjunctivae normal.      Pupils: Pupils are equal, round, and reactive to light.   Cardiovascular:      Rate and Rhythm: Normal rate and regular rhythm.      Pulses: Normal pulses.      Heart sounds: Normal heart sounds.      Comments: Anterior chest incision site well-healing  Pulmonary:      Effort: Pulmonary effort is normal.      Breath sounds: Normal breath sounds.   Abdominal:      General: Bowel sounds are normal.      Palpations: Abdomen is soft.   Musculoskeletal:         General: No swelling or tenderness. Normal range of motion.      Cervical back: Normal range of motion and neck supple.      Right lower leg: Edema (Of her Recent right knee swollen mild tenderness left knee 1+) present.      Left lower leg: Edema (Trace edema) present.   Skin:     General: Skin is warm.   Neurological:      General: No focal deficit present.      Mental Status: She is alert and oriented to person, place, and time. Mental status is at baseline.         RECENT LABS:  Hematology WBC   Date Value Ref Range Status   08/30/2023 6.35 3.40 - 10.80 10*3/mm3 Final   06/21/2022 10.22 4.5 - 11.0 10*3/uL Final     RBC   Date Value Ref Range Status   08/30/2023 4.27 3.77 - 5.28 10*6/mm3 Final   06/21/2022 3.80 (L) 4.0 - 5.2 10*6/uL Final     Hemoglobin   Date Value Ref Range Status   08/30/2023 13.8 12.0 - 15.9 g/dL Final   06/21/2022 11.9 (L) 12.0 - 16.0 g/dL Final     Hematocrit   Date Value Ref Range Status   08/30/2023 39.9 34.0 - 46.6 % Final   06/21/2022 36.9 36.0 - 46.0 % Final     Platelets   Date Value Ref Range Status   08/30/2023 174 140 - 450 10*3/mm3 Final   06/21/2022 323 140 - 440 10*3/uL Final          Assessment & Plan       67-year-old female followed with a medical history of asthma, hyperlipidemia, hypertension and potentially coronary artery disease with recent development of hypercalcemia secondary to hyperparathyroidism.  She underwent a hemithyroidectomy with removal of this portion of the left thyroid gland, parathyroidectomy for parathyroid adenoma.  As we review the pathology results she, additionally, had noted hearing loss and is to have an MRI to rule out acoustic neuroma.  Her other screening has been negative.  Following her surgery unexpectedly within 24 hours she developed a swollen left lower extremity and was found to have evidence of acute, occlusive DVT in the popliteal, posterior tibial and peroneal veins.  She was placed on and is responding to Xarelto anticoagulation.     We have discussed that there was some provocation for this, albeit very modest, and that 1 would want to assess her hypercoagulable status though do this at a point when she is off Xarelto since this would confuse some of the results.  After additional discussion we proceeded with a chest x-ray that was normal and laboratory testing with heterozygous findings for factor V Leiden, no prothrombin gene mutation, negative lupus anticoagulant testing, lupus anticoagulant found to be positive, beta-2 glycoprotein studies negative, Factor VIII of 116, normal homocysteine.  The patient indicates that she has been advised by her ENT physician that she remains hypercalcemic, unexpectedly so, may require more surgery.      She did, incidentally, hold her Xarelto prior to retesting for lupus anticoagulant.  The patient is reassessed 11/23/2021 we have discussed rechecking her lupus anticoagulant 12 weeks to be certain of whether this continues to be elevated and potentially satisfies criteria for antiphospholipid antibody syndrome.  The patient currently continues to respond to Xarelto.     The patient continue Xarelto and we had plan to try to obtain  repeat lupus anticoagulant but, fortunately, after patient requested a reassessment before these tests were done, find that the patient has responded with a clear improvement in her left lower extremity and only residual chronic popliteal thrombosis.  After further discussion we provide additional information so that her insurance company could make an informed decision.  Fortunately they did see that her testing was indeed necessary though we do not have an additional lupus anticoagulant exam.  Further we had her continue prophylactic Xarelto which she has continued since last visit in which was used successfully in and around additional surgery for an thoracic goiter.  She is seen 12/19/2022 and has not had any thrombotic complications and is urged to continue prophylactic Xarelto.    The patient is next seen 6/26/2023 having developed swelling in her right lower extremity that been progressively worsening.  She has been evaluated by orthopedics and felt to have ligamental weakening in her right knee though she indicates both the upper calf region and the knee have slowly been enlarging in size.  Considering her history and low-dose anticoagulant we have discussed that a reassessment via repeat Doppler examination of her lower extremities.    We went on to continue Xeloda at prophylactic dosing and the patient underwent repeat Doppler examination showing chronic left lower extremity DVT in the popliteal, deep valvular incompetence and left popliteal.    The patient has continued her prophylactic anticoagulation and states that she has been unable to complete surgical therapy for hyperparathyroidism as result of surgical bleeding.  It is not clear that she needs to undergo parathyroid surgery further though we will recheck her bone density to be certain of her status.    Patient is seen back with plans to see her ENT in the near future.  A follow-up bone density was obtained 8/21/2023 which demonstrates modest  worsening in terms of developing osteopenia as compared to previous.  She has had no issues with thrombotic or hemorrhagic complications on low-dose Xarelto which she is willing to continue.  Please note that also in the interval since she was last seen she underwent repair of right rotator cuff successfully without any hemorrhagic or thrombotic complication.       Plan:  *Xarelto to continue at current dosing    *Plans for follow-up with ENT    *1 year follow-up MD, CBC, CMP.

## 2023-08-30 ENCOUNTER — OFFICE VISIT (OUTPATIENT)
Dept: ONCOLOGY | Facility: CLINIC | Age: 68
End: 2023-08-30
Payer: COMMERCIAL

## 2023-08-30 ENCOUNTER — LAB (OUTPATIENT)
Dept: LAB | Facility: HOSPITAL | Age: 68
End: 2023-08-30
Payer: COMMERCIAL

## 2023-08-30 VITALS
SYSTOLIC BLOOD PRESSURE: 134 MMHG | TEMPERATURE: 98.2 F | HEIGHT: 64 IN | WEIGHT: 156.9 LBS | OXYGEN SATURATION: 96 % | RESPIRATION RATE: 18 BRPM | DIASTOLIC BLOOD PRESSURE: 75 MMHG | HEART RATE: 71 BPM | BODY MASS INDEX: 26.79 KG/M2

## 2023-08-30 DIAGNOSIS — I82.4Z9 DVT, LOWER EXTREMITY, DISTAL, ACUTE, UNSPECIFIED LATERALITY: Primary | ICD-10-CM

## 2023-08-30 DIAGNOSIS — E21.5 PARATHYROID GLAND DISORDER: ICD-10-CM

## 2023-08-30 LAB
BASOPHILS # BLD AUTO: 0.08 10*3/MM3 (ref 0–0.2)
BASOPHILS NFR BLD AUTO: 1.3 % (ref 0–1.5)
DEPRECATED RDW RBC AUTO: 43.8 FL (ref 37–54)
EOSINOPHIL # BLD AUTO: 0.21 10*3/MM3 (ref 0–0.4)
EOSINOPHIL NFR BLD AUTO: 3.3 % (ref 0.3–6.2)
ERYTHROCYTE [DISTWIDTH] IN BLOOD BY AUTOMATED COUNT: 12.9 % (ref 12.3–15.4)
HCT VFR BLD AUTO: 39.9 % (ref 34–46.6)
HGB BLD-MCNC: 13.8 G/DL (ref 12–15.9)
IMM GRANULOCYTES # BLD AUTO: 0.02 10*3/MM3 (ref 0–0.05)
IMM GRANULOCYTES NFR BLD AUTO: 0.3 % (ref 0–0.5)
LYMPHOCYTES # BLD AUTO: 1.74 10*3/MM3 (ref 0.7–3.1)
LYMPHOCYTES NFR BLD AUTO: 27.4 % (ref 19.6–45.3)
MCH RBC QN AUTO: 32.3 PG (ref 26.6–33)
MCHC RBC AUTO-ENTMCNC: 34.6 G/DL (ref 31.5–35.7)
MCV RBC AUTO: 93.4 FL (ref 79–97)
MONOCYTES # BLD AUTO: 0.58 10*3/MM3 (ref 0.1–0.9)
MONOCYTES NFR BLD AUTO: 9.1 % (ref 5–12)
NEUTROPHILS NFR BLD AUTO: 3.72 10*3/MM3 (ref 1.7–7)
NEUTROPHILS NFR BLD AUTO: 58.6 % (ref 42.7–76)
NRBC BLD AUTO-RTO: 0 /100 WBC (ref 0–0.2)
PLATELET # BLD AUTO: 174 10*3/MM3 (ref 140–450)
PMV BLD AUTO: 10.9 FL (ref 6–12)
RBC # BLD AUTO: 4.27 10*6/MM3 (ref 3.77–5.28)
WBC NRBC COR # BLD: 6.35 10*3/MM3 (ref 3.4–10.8)

## 2023-08-30 PROCEDURE — 36415 COLL VENOUS BLD VENIPUNCTURE: CPT

## 2023-08-30 PROCEDURE — 85025 COMPLETE CBC W/AUTO DIFF WBC: CPT

## 2023-08-30 PROCEDURE — 99214 OFFICE O/P EST MOD 30 MIN: CPT | Performed by: INTERNAL MEDICINE

## 2023-08-30 RX ORDER — CEPHALEXIN 250 MG/1
250 CAPSULE ORAL
COMMUNITY
Start: 2023-08-16

## 2023-11-17 ENCOUNTER — OFFICE VISIT (OUTPATIENT)
Dept: FAMILY MEDICINE CLINIC | Facility: CLINIC | Age: 68
End: 2023-11-17
Payer: COMMERCIAL

## 2023-11-17 VITALS
OXYGEN SATURATION: 98 % | DIASTOLIC BLOOD PRESSURE: 70 MMHG | BODY MASS INDEX: 26.4 KG/M2 | HEART RATE: 75 BPM | WEIGHT: 154.6 LBS | SYSTOLIC BLOOD PRESSURE: 124 MMHG | RESPIRATION RATE: 16 BRPM | HEIGHT: 64 IN

## 2023-11-17 DIAGNOSIS — E78.2 MIXED HYPERLIPIDEMIA: ICD-10-CM

## 2023-11-17 DIAGNOSIS — Z09 HOSPITAL DISCHARGE FOLLOW-UP: Primary | ICD-10-CM

## 2023-11-17 DIAGNOSIS — I10 ESSENTIAL HYPERTENSION: ICD-10-CM

## 2023-11-17 DIAGNOSIS — J18.9 PNEUMONIA DUE TO INFECTIOUS ORGANISM, UNSPECIFIED LATERALITY, UNSPECIFIED PART OF LUNG: ICD-10-CM

## 2023-11-17 RX ORDER — FENOFIBRATE 160 MG/1
160 TABLET ORAL DAILY
Qty: 90 TABLET | Refills: 3 | Status: SHIPPED | OUTPATIENT
Start: 2023-11-17

## 2023-11-17 RX ORDER — AMLODIPINE BESYLATE 2.5 MG/1
2.5 TABLET ORAL DAILY
Qty: 90 TABLET | Refills: 3 | Status: SHIPPED | OUTPATIENT
Start: 2023-11-17

## 2023-11-17 RX ORDER — LOSARTAN POTASSIUM AND HYDROCHLOROTHIAZIDE 12.5; 5 MG/1; MG/1
1 TABLET ORAL DAILY
Qty: 90 TABLET | Refills: 3 | Status: SHIPPED | OUTPATIENT
Start: 2023-11-17

## 2023-11-17 NOTE — PROGRESS NOTES
Subjective   Sera Salas is a 68 y.o. female.     History of Present Illness   Sera Salas 68 y.o. female presents today for hosptial follow up.  she was treated [facility] for pneumonia .  I reviewed all of the labs and diagnostic testing.  The patient's medications were not changed:  Current outpatient and discharge medications have been reconciled for the patient.  Reviewed by: JOSÉ Smith ([place])    she does not have a follow up appointment with a specialist:     Hospital note as follows:  Reason for Admission  Chest pain    Treatment of hospital problems noted as below:  Chest pain: Atypical in nature.  Occurred after trying to ingest pill medication.  Was actually worse with drinking water.  Now reports resolution.  Troponins negative.  CTPA negative for PE. Given the reported events, feel less likely cardiac in nature.  Hold on additional work-up at this time.     Cough, congestion, recent fever: CT with suspected left lower lobe infiltrate.  Started on empiric antibiotics. Pulmonary was consulted. No additional testing at this time.    Asthma: No acute exacerbation.  Resume previous home medications.     Hypertension: Resume previous home medication, amlodipine     Hyperlipidemia     History of DVT: Xarelto     Paratracheal mass: Discussed with pulmonary.  Has had previous parathyroidectomy.  Has followed with outpatient provider.  Will defer to outpatient work-up at this time.      Significant Diagnostic Studies/Imaging/Procedures:  I have reviewed the patient's pertinent lab, radiology, and diagnostic test results.    Discharge Medications:  Current Discharge Medication List    START taking these medications  Details  amoxicillin-clavulanate (AUGMENTIN) 875-125 MG Take 1 tablet by mouth 2 (two) times daily for 5 days.  Qty: 10 tablet, Refills: 0    guaiFENesin (MUCINEX) 600 MG 12 hr tablet Take 1 tablet by mouth 2 (two) times daily for 7 days.  Qty: 14 tablet, Refills: 0      Patient reports  feeling improved since hospital discharge. She denies feeling short of breath and denies cough. She still has some clear rhinorrhea.  She had low potassium that was treated and repeat was normal. She had some dilutional decrease in hemoglobin.        She is also due for refills on her medications today.  Her BP is well controlled on amlodipine and lisniopril/hctz and takes fenofibrate for hypertriglyceridemia.  She is due for labs.   The following portions of the patient's history were reviewed and updated as appropriate: allergies, current medications, past family history, past medical history, past social history, past surgical history, and problem list.    Review of Systems   Constitutional:  Negative for chills, fever and unexpected weight change.   HENT:  Positive for rhinorrhea.    Respiratory:  Negative for cough and shortness of breath.    Cardiovascular:  Negative for chest pain and palpitations.   Psychiatric/Behavioral:  Negative for behavioral problems.        Objective   Physical Exam  Vitals and nursing note reviewed.   Constitutional:       Appearance: Normal appearance. She is well-developed.   Cardiovascular:      Rate and Rhythm: Normal rate and regular rhythm.   Pulmonary:      Effort: Pulmonary effort is normal.      Breath sounds: Normal breath sounds.   Neurological:      Mental Status: She is alert and oriented to person, place, and time.   Psychiatric:         Mood and Affect: Mood normal.         Behavior: Behavior normal.         Thought Content: Thought content normal.         Judgment: Judgment normal.         Assessment & Plan   Diagnoses and all orders for this visit:    1. Hospital discharge follow-up (Primary)    2. Mixed hyperlipidemia  -     amLODIPine (NORVASC) 2.5 MG tablet; Take 1 tablet by mouth Daily.  Dispense: 90 tablet; Refill: 3  -     fenofibrate 160 MG tablet; Take 1 tablet by mouth Daily.  Dispense: 90 tablet; Refill: 3  -     Comprehensive metabolic panel  -     Lipid  panel  -     CBC and Differential  -     TSH    3. Essential hypertension  -     amLODIPine (NORVASC) 2.5 MG tablet; Take 1 tablet by mouth Daily.  Dispense: 90 tablet; Refill: 3  -     fenofibrate 160 MG tablet; Take 1 tablet by mouth Daily.  Dispense: 90 tablet; Refill: 3  -     losartan-hydrochlorothiazide (HYZAAR) 50-12.5 MG per tablet; Take 1 tablet by mouth Daily.  Dispense: 90 tablet; Refill: 3  -     Comprehensive metabolic panel  -     Lipid panel  -     CBC and Differential  -     TSH    4. Pneumonia due to infectious organism, unspecified laterality, unspecified part of lung  Comments:  resolved             Hospital records reviewed with pt confirming HPI.     Current outpatient and discharge medications have been reconciled for the patient.  Reviewed by: JOSÉ Smith ([place])

## 2023-11-18 LAB
ALBUMIN SERPL-MCNC: 5.1 G/DL (ref 3.5–5.2)
ALBUMIN/GLOB SERPL: 2.2 G/DL
ALP SERPL-CCNC: 107 U/L (ref 39–117)
ALT SERPL-CCNC: 37 U/L (ref 1–33)
AST SERPL-CCNC: 43 U/L (ref 1–32)
BASOPHILS # BLD AUTO: 0.07 10*3/MM3 (ref 0–0.2)
BASOPHILS NFR BLD AUTO: 1.5 % (ref 0–1.5)
BILIRUB SERPL-MCNC: 0.5 MG/DL (ref 0–1.2)
BUN SERPL-MCNC: 21 MG/DL (ref 8–23)
BUN/CREAT SERPL: 22.3 (ref 7–25)
CALCIUM SERPL-MCNC: 10.6 MG/DL (ref 8.6–10.5)
CHLORIDE SERPL-SCNC: 104 MMOL/L (ref 98–107)
CHOLEST SERPL-MCNC: 226 MG/DL (ref 0–200)
CO2 SERPL-SCNC: 27.2 MMOL/L (ref 22–29)
CREAT SERPL-MCNC: 0.94 MG/DL (ref 0.57–1)
EGFRCR SERPLBLD CKD-EPI 2021: 67.1 ML/MIN/1.73
EOSINOPHIL # BLD AUTO: 0.23 10*3/MM3 (ref 0–0.4)
EOSINOPHIL NFR BLD AUTO: 5 % (ref 0.3–6.2)
ERYTHROCYTE [DISTWIDTH] IN BLOOD BY AUTOMATED COUNT: 12.5 % (ref 12.3–15.4)
GLOBULIN SER CALC-MCNC: 2.3 GM/DL
GLUCOSE SERPL-MCNC: 110 MG/DL (ref 65–99)
HCT VFR BLD AUTO: 41.4 % (ref 34–46.6)
HDLC SERPL-MCNC: 57 MG/DL (ref 40–60)
HGB BLD-MCNC: 14.1 G/DL (ref 12–15.9)
IMM GRANULOCYTES # BLD AUTO: 0.02 10*3/MM3 (ref 0–0.05)
IMM GRANULOCYTES NFR BLD AUTO: 0.4 % (ref 0–0.5)
LDLC SERPL CALC-MCNC: 139 MG/DL (ref 0–100)
LYMPHOCYTES # BLD AUTO: 1.3 10*3/MM3 (ref 0.7–3.1)
LYMPHOCYTES NFR BLD AUTO: 28.4 % (ref 19.6–45.3)
MCH RBC QN AUTO: 31.5 PG (ref 26.6–33)
MCHC RBC AUTO-ENTMCNC: 34.1 G/DL (ref 31.5–35.7)
MCV RBC AUTO: 92.4 FL (ref 79–97)
MONOCYTES # BLD AUTO: 0.41 10*3/MM3 (ref 0.1–0.9)
MONOCYTES NFR BLD AUTO: 9 % (ref 5–12)
NEUTROPHILS # BLD AUTO: 2.55 10*3/MM3 (ref 1.7–7)
NEUTROPHILS NFR BLD AUTO: 55.7 % (ref 42.7–76)
NRBC BLD AUTO-RTO: 0 /100 WBC (ref 0–0.2)
PLATELET # BLD AUTO: 206 10*3/MM3 (ref 140–450)
POTASSIUM SERPL-SCNC: 4.4 MMOL/L (ref 3.5–5.2)
PROT SERPL-MCNC: 7.4 G/DL (ref 6–8.5)
RBC # BLD AUTO: 4.48 10*6/MM3 (ref 3.77–5.28)
SODIUM SERPL-SCNC: 140 MMOL/L (ref 136–145)
TRIGL SERPL-MCNC: 171 MG/DL (ref 0–150)
TSH SERPL DL<=0.005 MIU/L-ACNC: 2.33 UIU/ML (ref 0.27–4.2)
VLDLC SERPL CALC-MCNC: 30 MG/DL (ref 5–40)
WBC # BLD AUTO: 4.58 10*3/MM3 (ref 3.4–10.8)

## 2023-12-10 DIAGNOSIS — I10 ESSENTIAL HYPERTENSION: ICD-10-CM

## 2023-12-10 DIAGNOSIS — E78.2 MIXED HYPERLIPIDEMIA: ICD-10-CM

## 2023-12-10 RX ORDER — FENOFIBRATE 160 MG/1
160 TABLET ORAL DAILY
Qty: 90 TABLET | Refills: 3 | Status: SHIPPED | OUTPATIENT
Start: 2023-12-10

## 2024-01-16 DIAGNOSIS — I82.492 ACUTE DEEP VEIN THROMBOSIS (DVT) OF OTHER SPECIFIED VEIN OF LEFT LOWER EXTREMITY: ICD-10-CM

## 2024-01-16 RX ORDER — RIVAROXABAN 10 MG/1
10 TABLET, FILM COATED ORAL DAILY
Qty: 90 TABLET | Refills: 0 | Status: SHIPPED | OUTPATIENT
Start: 2024-01-16

## 2024-02-02 ENCOUNTER — TELEPHONE (OUTPATIENT)
Dept: ONCOLOGY | Facility: CLINIC | Age: 69
End: 2024-02-02
Payer: COMMERCIAL

## 2024-02-02 NOTE — TELEPHONE ENCOUNTER
I have reviewed her record and I do not think there is an alternative that would work as well for her.  MDK     Patient called me back. I let her know what Dr. Jones stated and let her know we could help with samples and she was relived and v/u.

## 2024-02-02 NOTE — TELEPHONE ENCOUNTER
I have reviewed her record and I do not think there is an alternative that would work as well for her.  SCOOBY

## 2024-02-02 NOTE — TELEPHONE ENCOUNTER
Caller: Sera Salas    Relationship: Self    Best call back number: [phone]    What is the best time to reach you: ANY    Who are you requesting to speak with (clinical staff, provider,  specific staff member): CLINICAL    What was the call regarding: SERA IS CALLING STATES SHE IS SWITCHING TO MEDICARE IN APRIL.    SHE IS CURRENTLY ON XARELLTO AND SHE IS WANTING TO KNOW IF THERE IS SOMETHING CHEAPER DR BHAT CAN SWITCH HER TO    PLEASE ADVISE

## 2024-02-09 DIAGNOSIS — I82.492 ACUTE DEEP VEIN THROMBOSIS (DVT) OF OTHER SPECIFIED VEIN OF LEFT LOWER EXTREMITY: ICD-10-CM

## 2024-02-09 RX ORDER — RIVAROXABAN 10 MG/1
10 TABLET, FILM COATED ORAL DAILY
Qty: 90 TABLET | Refills: 0 | OUTPATIENT
Start: 2024-02-09

## 2024-04-15 ENCOUNTER — TELEPHONE (OUTPATIENT)
Dept: ONCOLOGY | Facility: CLINIC | Age: 69
End: 2024-04-15

## 2024-04-15 NOTE — TELEPHONE ENCOUNTER
Caller: Sera Salas    Relationship: Self    Best call back number: [phone]    What is the best time to reach you: ANYTIME    Who are you requesting to speak with (clinical staff, provider,  specific staff member): CLINICAL    What was the call regarding: PT IS REQUESTING A CALL BACK FROM [facility] IN REGARDS FOR SAMPLES OF XARELTO

## 2024-05-06 ENCOUNTER — OFFICE VISIT (OUTPATIENT)
Dept: FAMILY MEDICINE CLINIC | Facility: CLINIC | Age: 69
End: 2024-05-06
Payer: MEDICARE

## 2024-05-06 VITALS
RESPIRATION RATE: 16 BRPM | BODY MASS INDEX: 25.27 KG/M2 | WEIGHT: 148 LBS | OXYGEN SATURATION: 99 % | DIASTOLIC BLOOD PRESSURE: 70 MMHG | HEIGHT: 64 IN | SYSTOLIC BLOOD PRESSURE: 118 MMHG | HEART RATE: 67 BPM

## 2024-05-06 DIAGNOSIS — J40 BRONCHITIS: Primary | ICD-10-CM

## 2024-05-06 PROCEDURE — 3078F DIAST BP <80 MM HG: CPT | Performed by: NURSE PRACTITIONER

## 2024-05-06 PROCEDURE — 1159F MED LIST DOCD IN RCRD: CPT | Performed by: NURSE PRACTITIONER

## 2024-05-06 PROCEDURE — 1160F RVW MEDS BY RX/DR IN RCRD: CPT | Performed by: NURSE PRACTITIONER

## 2024-05-06 PROCEDURE — 3074F SYST BP LT 130 MM HG: CPT | Performed by: NURSE PRACTITIONER

## 2024-05-06 PROCEDURE — 99213 OFFICE O/P EST LOW 20 MIN: CPT | Performed by: NURSE PRACTITIONER

## 2024-05-06 RX ORDER — PREDNISONE 20 MG/1
20 TABLET ORAL DAILY
Qty: 10 TABLET | Refills: 0 | Status: SHIPPED | OUTPATIENT
Start: 2024-05-06

## 2024-05-06 RX ORDER — AMOXICILLIN AND CLAVULANATE POTASSIUM 875; 125 MG/1; MG/1
1 TABLET, FILM COATED ORAL 2 TIMES DAILY
Qty: 20 TABLET | Refills: 0 | Status: SHIPPED | OUTPATIENT
Start: 2024-05-06 | End: 2024-05-16

## 2024-05-21 ENCOUNTER — TELEPHONE (OUTPATIENT)
Dept: ONCOLOGY | Facility: CLINIC | Age: 69
End: 2024-05-21

## 2024-05-21 NOTE — TELEPHONE ENCOUNTER
Caller: CESAR CABELLO     Relationship:PATIENT    Callback number: [phone]   Is it ok to leave a message: [x] Yes [] No    Requested medication for samples: XARELTO 10 MG     How much medication does the patient currently have left: GOOD UNTIL THE END OF THE WEEK    Who will be picking up the samples: CESAR, PREFERS [facility] BUT WILL GO TO [facility].    Do you need information about patient financial assistance for this medication: [] Yes [x] No

## 2024-06-25 ENCOUNTER — TELEPHONE (OUTPATIENT)
Dept: ONCOLOGY | Facility: CLINIC | Age: 69
End: 2024-06-25

## 2024-06-25 NOTE — TELEPHONE ENCOUNTER
Caller Name: Sera Salas FERNY  Relationship: Self  Best Contact Number: [phone]    Patient is requesting samples of XARELTO 10 MG  How many days of medication do you have left? 3 OR 4 DAYS

## 2024-07-24 ENCOUNTER — TELEPHONE (OUTPATIENT)
Dept: ONCOLOGY | Facility: CLINIC | Age: 69
End: 2024-07-24
Payer: MEDICARE

## 2024-07-24 NOTE — TELEPHONE ENCOUNTER
Caller: Sera Salas    Relationship: Self    Best call back number: [phone]     Who are you requesting to speak with (clinical staff, provider,  specific staff member): CLINICAL      What was the call regarding: PATIENT CALLING TO REQUEST XARELTO SAMPLES AT [facility]

## 2024-08-26 NOTE — PROGRESS NOTES
REASON FOR FOLLOW-UP: Acute left lower extremity DVT    History of Present Illness   The patient is a 67-year-old female followed by primary care with hypercalcemia (hyperparathyroidism), glucose intolerance, asthma, hyperlipidemia, hypertension, and coronary artery disease as well as chronic back pain as well as coronary artery disease.  She was seen by cardiology 7/22/2021 having undergone stress testing and was felt to be stable.    She had follow-up with ENT 9/24 having undergone a hemithyroidectomy with removal of this  portion of the left thyroid lobe, parathyroidectomy for parathyroid adenoma.  The pathology is not immediately available for review.  The patient states she is also undergoing assessment for hearing loss by Dr. Jeevan Sharma with an MRI plan to rule out acoustic neuroma.  Additionally she has had previous colonoscopy in the last several years without abnormalities noted.         Following her surgery she had left calf pain for approximately week and was seen at [place] facility 10/1/2021 with evidence of acute, occlusive DVT in the popliteal, posterior tibial and peroneal veins.  The patient states that this DVT development began 1 day post surgery.  She had been without any discomfort or swelling in the extremity prior to the procedure and compression devices were used perioperatively.  She was placed on Xarelto starter pack and has since transitioned to 20 mg daily.  She states, at the time of this consultation, that she no longer is swelling in the extremity and feels well.  There is no family history that she is aware of any thrombotic tendencies.    Please note that subsequent history taken from the patient indicate that her brother had a similar issue per thrombosis bringing up the clear possibility of an inherited tendency for thrombosis    The patient returns for testing obtained 11/16/2021 with heterozygous findings for factor V Leiden, no prothrombin gene mutation, negative lupus  anticoagulant testing, lupus anticoagulant found to be positive, beta-2 glycoprotein studies negative, Factor VIII of 116, normal homocysteine.  The patient indicates that she has been advised by her ENT physician that she remains hypercalcemic, unexpectedly so, may require more surgery,    The patient is next seen 3/4/2022 indicating that insurance company would not cover her factor V Leiden prothrombin gene mutation which by history were of concern (factor V Leiden found to be heterozygous positive).  Of greater concern would be a consistently positive lupus anticoagulant though the patient has clearly responded to her Xarelto therapy with subsequent Doppler examination 2/18/2021 demonstrating improvement in only chronic left lower thrombosis in the popliteal noted.  Again this is evidence the patient is being properly treated for her factor V Leiden mutation and subsequent thrombosis.    The patient is next seen in office 12/19/2022 indicating that she is undergoing additional surgery via ENT for removal of tissue related to her goiter.  Unfortunately we are unable to review those results though a CTA of chest 6/21/2022 when she presented with left sided pleuritic chest pain reveals a large left paratracheal lesion that was unchanged from 6/9/2022 and was felt to be either metastatic lymphadenopathy or intrathoracic goiter.  The patient held her Xarelto dosing perioperatively and, fortunately, did not subsequently have any thrombotic complications.    The patient is next seen 6/26/2023 having developed swelling in her right lower extremity that been progressively worsening.  She has been evaluated by orthopedics and felt to have ligamental weakening in her right knee though she indicates both the upper calf region and the knee have slowly been enlarging in size.  Considering her history and low-dose anticoagulant we have discussed that a reassessment via repeat Doppler examination of her lower extremities.    The  patient is seen 7/5/2023.  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    The patient has continued her prophylactic anticoagulation and states that she has been unable to complete surgical therapy for hyperparathyroidism as result of surgical bleeding.    Patient is seen back with plans to see her ENT in the near future.  A follow-up bone density was obtained 8/21/2023 which demonstrates modest worsening in terms of developing osteopenia as compared to previous.  She has had no issues with thrombotic or hemorrhagic complications on low-dose Xarelto which she is willing to continue.  Please note that also in the interval since she was last seen she underwent repair of right rotator cuff successfully without any hemorrhagic or thrombotic complication.    The patient is next seen 8/27/2024 with normal CBC, CMP pending radiologic testing in June with superior mediastinal mass noted on chest x-ray and CT of neck showing surgical absence of the left lobe of the thyroid, stable enhancing superior mediastinal mass with local mass effect measuring 2.9 x 4.8 x 4.8-with also evidence of parathyroid adenoma though ectopic thyroid tissue with nodular goiter could have the same appearance.  The patient states that she is seeing ENT concerning this and has now been referred to endocrinology as well.    Past Medical History:   Diagnosis Date    Allergic     Anxiety     Arthritis     Asthma     Bleeding disorder     no fefinitive diagnosis  hematologist following and needs further testing    CAD (coronary artery disease)     Disease of thyroid gland     DVT, lower extremity, distal, acute, unspecified laterality     on med    Frequent UTI     wears pads    H/O bone density study DUE    H/O complete eye exam DUE    HL (hearing loss) may 2021    Hx of benign neoplasm of parathyroid gland     left side    Hyperlipidemia      Hypertension     On continuous oral anticoagulation     xarelto    Parathyroid gland disorder     has 1parathyroid with mass and is waiting gor surgery to remove    Pneumonia oct 2023    Seasonal allergies     Shoulder pain     right rotator cuff tear        Past Surgical History:   Procedure Laterality Date     SECTION  ,     COLONOSCOPY      MAMMO BILATERAL  DUE    PAP SMEAR  DUE    PARATHYROIDECTOMY Left     x1    SHOULDER ARTHROSCOPY W/ ROTATOR CUFF REPAIR Right 2023    Procedure: SHOULDER ARTHROSCOPY WITH ROTATOR CUFF REPAIR, INJECTION RIGHT KNEE;  Surgeon: Zachary William MD;  Location: [facility] OR [facility];  Service: Orthopedics;  Laterality: Right;    THYROID SURGERY Left     partial    TUBAL ABDOMINAL LIGATION          Current Outpatient Medications on File Prior to Visit   Medication Sig Dispense Refill    albuterol (PROVENTIL HFA;VENTOLIN HFA) 108 (90 BASE) MCG/ACT inhaler Inhale 2 puffs Every 4 (Four) Hours As Needed for Wheezing.      amLODIPine (NORVASC) 2.5 MG tablet Take 1 tablet by mouth Daily. 90 tablet 3    azelastine (ASTELIN) 0.1 % nasal spray 2 sprays into the nostril(s) as directed by provider 2 (Two) Times a Day. Use in each nostril as directed      cetirizine (ZyrTEC) 10 MG tablet Take 1 tablet by mouth Every Morning.      fenofibrate 160 MG tablet TAKE 1 TABLET BY MOUTH DAILY 90 tablet 3    fluticasone-salmeterol (ADVAIR HFA) 230-21 MCG/ACT inhaler Inhale 2 puffs 2 (Two) Times a Day.      losartan-hydrochlorothiazide (HYZAAR) 50-12.5 MG per tablet Take 1 tablet by mouth Daily. 90 tablet 3    montelukast (SINGULAIR) 10 MG tablet Take 1 tablet by mouth Every Morning.      [DISCONTINUED] rivaroxaban (Xarelto) 10 MG tablet Take 1 tablet by mouth Daily. 28 tablet 0    predniSONE (DELTASONE) 20 MG tablet Take 1 tablet by mouth Daily. (Patient not taking: Reported on 2024) 10 tablet 0    solifenacin (VESICARE) 5 MG tablet Take  by mouth Daily.       "sulfamethoxazole-trimethoprim (BACTRIM DS,SEPTRA DS) 800-160 MG per tablet Take 1 tablet by mouth 2 (Two) Times a Day.       No current facility-administered medications on file prior to visit.        ALLERGIES:  No Known Allergies     Social History     Socioeconomic History    Marital status:      Spouse name: Marlon    Number of children: 2    Years of education: High school   Tobacco Use    Smoking status: Never    Smokeless tobacco: Never    Tobacco comments:     caffeine use 1-2 sodas daily   Vaping Use    Vaping status: Never Used   Substance and Sexual Activity    Alcohol use: No    Drug use: No    Sexual activity: Yes     Partners: Male     Birth control/protection: None, Tubal ligation        Family History   Problem Relation Age of Onset    Stroke Mother     Arthritis Mother     Heart disease Brother     Hypertension Brother     Diabetes Brother     Heart disease Brother     Heart disease Other     Hypertension Other     Stroke Other     Malig Hyperthermia Neg Hx         Review of Systems   Constitutional:  Negative for activity change, fatigue, fever and unexpected weight change.   HENT:  Positive for tinnitus.         See history of present illness   Eyes: Negative.    Respiratory:  Positive for cough (Increased general secretions including mucus).    Cardiovascular: Negative.    Gastrointestinal: Negative.    Genitourinary: Negative.    Musculoskeletal: Negative.    Skin:  Negative for color change.   Allergic/Immunologic: Negative.    Neurological: Negative.    Psychiatric/Behavioral: Negative.          Objective     Vitals:    08/27/24 1345   BP: 122/72   Pulse: 68   Resp: 14   Temp: 98.5 °F (36.9 °C)   SpO2: 94%   Weight: 69.2 kg (152 lb 8 oz)   Height: 163.8 cm (64.49\")   PainSc: 0-No pain           8/27/2024     1:45 PM   Current Status   ECOG score 0       Physical Exam  Constitutional:       Appearance: Normal appearance.   HENT:      Nose: Nose normal.      Mouth/Throat:      Mouth: " Mucous membranes are moist.      Pharynx: Oropharynx is clear.   Eyes:      Extraocular Movements: Extraocular movements intact.      Conjunctiva/sclera: Conjunctivae normal.      Pupils: Pupils are equal, round, and reactive to light.   Cardiovascular:      Rate and Rhythm: Normal rate and regular rhythm.      Pulses: Normal pulses.      Heart sounds: Normal heart sounds.      Comments: Anterior chest incision site well-healing  Pulmonary:      Effort: Pulmonary effort is normal.      Breath sounds: Normal breath sounds.   Abdominal:      General: Bowel sounds are normal.      Palpations: Abdomen is soft.   Musculoskeletal:         General: No swelling or tenderness. Normal range of motion.      Cervical back: Normal range of motion and neck supple.      Right lower leg: Edema (Of her Recent right knee swollen mild tenderness left knee 1+) present.      Left lower leg: Edema (Trace edema) present.   Skin:     General: Skin is warm.   Neurological:      General: No focal deficit present.      Mental Status: She is alert and oriented to person, place, and time. Mental status is at baseline.           RECENT LABS:  Hematology WBC   Date Value Ref Range Status   08/27/2024 5.93 3.40 - 10.80 10*3/mm3 Final   11/17/2023 4.58 3.40 - 10.80 10*3/mm3 Final   10/27/2023 6.55 4.5 - 11.0 10*3/uL Final     RBC   Date Value Ref Range Status   08/27/2024 4.20 3.77 - 5.28 10*6/mm3 Final   11/17/2023 4.48 3.77 - 5.28 10*6/mm3 Final   10/27/2023 3.87 (L) 4.0 - 5.2 10*6/uL Final     Hemoglobin   Date Value Ref Range Status   08/27/2024 13.1 12.0 - 15.9 g/dL Final   10/27/2023 11.8 (L) 12.0 - 16.0 g/dL Final     Hematocrit   Date Value Ref Range Status   08/27/2024 40.7 34.0 - 46.6 % Final   10/27/2023 35.7 (L) 36.0 - 46.0 % Final     Platelets   Date Value Ref Range Status   08/27/2024 181 140 - 450 10*3/mm3 Final   10/27/2023 169 140 - 440 10*3/uL Final          Assessment & Plan      68-year-old female followed with a medical  history of asthma, hyperlipidemia, hypertension and potentially coronary artery disease with recent development of hypercalcemia secondary to hyperparathyroidism.  She underwent a hemithyroidectomy with removal of this portion of the left thyroid gland, parathyroidectomy for parathyroid adenoma.  As we review the pathology results she, additionally, had noted hearing loss and is to have an MRI to rule out acoustic neuroma.  Her other screening has been negative.  Following her surgery unexpectedly within 24 hours she developed a swollen left lower extremity and was found to have evidence of acute, occlusive DVT in the popliteal, posterior tibial and peroneal veins.  She was placed on and is responding to Xarelto anticoagulation.     We have discussed that there was some provocation for this, albeit very modest, and that 1 would want to assess her hypercoagulable status though do this at a point when she is off Xarelto since this would confuse some of the results.  After additional discussion we proceeded with a chest x-ray that was normal and laboratory testing with heterozygous findings for factor V Leiden, no prothrombin gene mutation, negative lupus anticoagulant testing, lupus anticoagulant found to be positive, beta-2 glycoprotein studies negative, Factor VIII of 116, normal homocysteine.  The patient indicates that she has been advised by her ENT physician that she remains hypercalcemic, unexpectedly so, may require more surgery.      She did, incidentally, hold her Xarelto prior to retesting for lupus anticoagulant.  The patient is reassessed 11/23/2021 we have discussed rechecking her lupus anticoagulant 12 weeks to be certain of whether this continues to be elevated and potentially satisfies criteria for antiphospholipid antibody syndrome.  The patient currently continues to respond to Xarelto.     The patient continue Xarelto and we had plan to try to obtain repeat lupus anticoagulant but, fortunately,  after patient requested a reassessment before these tests were done, find that the patient has responded with a clear improvement in her left lower extremity and only residual chronic popliteal thrombosis.  After further discussion we provide additional information so that her insurance company could make an informed decision.  Fortunately they did see that her testing was indeed necessary though we do not have an additional lupus anticoagulant exam.  Further we had her continue prophylactic Xarelto which she has continued since last visit in which was used successfully in and around additional surgery for an thoracic goiter.  She is seen 12/19/2022 and has not had any thrombotic complications and is urged to continue prophylactic Xarelto.    The patient is next seen 6/26/2023 having developed swelling in her right lower extremity that been progressively worsening.  She has been evaluated by orthopedics and felt to have ligamental weakening in her right knee though she indicates both the upper calf region and the knee have slowly been enlarging in size.  Considering her history and low-dose anticoagulant we have discussed that a reassessment via repeat Doppler examination of her lower extremities.    We went on to continue Xeloda at prophylactic dosing and the patient underwent repeat Doppler examination showing chronic left lower extremity DVT in the popliteal, deep valvular incompetence and left popliteal.    The patient has continued her prophylactic anticoagulation and states that she has been unable to complete surgical therapy for hyperparathyroidism as result of surgical bleeding.  It is not clear that she needs to undergo parathyroid surgery further though we will recheck her bone density to be certain of her status.    Patient is seen back with plans to see her ENT in the near future.  A follow-up bone density was obtained 8/21/2023 which demonstrates modest worsening in terms of developing osteopenia as  compared to previous.  She has had no issues with thrombotic or hemorrhagic complications on low-dose Xarelto which she is willing to continue.  Please note that also in the interval since she was last seen she underwent repair of right rotator cuff successfully without any hemorrhagic or thrombotic complication.    The patient is next seen 8/27/2024 with normal CBC, CMP pending radiologic testing in June with superior mediastinal mass noted on chest x-ray and CT of neck showing surgical absence of the left lobe of the thyroid, stable enhancing superior mediastinal mass with local mass effect measuring 2.9 x 4.8 x 4.8-with also evidence of parathyroid adenoma though ectopic thyroid tissue with nodular goiter could have the same appearance.  The patient states that she is seeing ENT concerning this and has now been referred to endocrinology as well.       Plan:  *Xarelto to continue at current dosing, provide samples as possible    *Plans for follow-up with ENT    *1 year follow-up MD, CBC, CMP.

## 2024-08-27 ENCOUNTER — OFFICE VISIT (OUTPATIENT)
Dept: ONCOLOGY | Facility: CLINIC | Age: 69
End: 2024-08-27
Payer: MEDICARE

## 2024-08-27 ENCOUNTER — LAB (OUTPATIENT)
Dept: OTHER | Facility: HOSPITAL | Age: 69
End: 2024-08-27
Payer: MEDICARE

## 2024-08-27 VITALS
OXYGEN SATURATION: 94 % | HEART RATE: 68 BPM | WEIGHT: 152.5 LBS | RESPIRATION RATE: 14 BRPM | SYSTOLIC BLOOD PRESSURE: 122 MMHG | TEMPERATURE: 98.5 F | DIASTOLIC BLOOD PRESSURE: 72 MMHG | HEIGHT: 64 IN | BODY MASS INDEX: 26.03 KG/M2

## 2024-08-27 DIAGNOSIS — I82.4Z9 DVT, LOWER EXTREMITY, DISTAL, ACUTE, UNSPECIFIED LATERALITY: ICD-10-CM

## 2024-08-27 DIAGNOSIS — I82.492 ACUTE DEEP VEIN THROMBOSIS (DVT) OF OTHER SPECIFIED VEIN OF LEFT LOWER EXTREMITY: ICD-10-CM

## 2024-08-27 LAB
ALBUMIN SERPL-MCNC: 4.5 G/DL (ref 3.5–5.2)
ALBUMIN/GLOB SERPL: 1.6 G/DL
ALP SERPL-CCNC: 75 U/L (ref 39–117)
ALT SERPL W P-5'-P-CCNC: 22 U/L (ref 1–33)
ANION GAP SERPL CALCULATED.3IONS-SCNC: 9.5 MMOL/L (ref 5–15)
AST SERPL-CCNC: 20 U/L (ref 1–32)
BASOPHILS # BLD AUTO: 0.05 10*3/MM3 (ref 0–0.2)
BASOPHILS NFR BLD AUTO: 0.8 % (ref 0–1.5)
BILIRUB SERPL-MCNC: 0.6 MG/DL (ref 0–1.2)
BUN SERPL-MCNC: 22 MG/DL (ref 8–23)
BUN/CREAT SERPL: 21.6 (ref 7–25)
CALCIUM SPEC-SCNC: 10.7 MG/DL (ref 8.6–10.5)
CHLORIDE SERPL-SCNC: 104 MMOL/L (ref 98–107)
CO2 SERPL-SCNC: 26.5 MMOL/L (ref 22–29)
CREAT SERPL-MCNC: 1.02 MG/DL (ref 0.57–1)
DEPRECATED RDW RBC AUTO: 46.3 FL (ref 37–54)
EGFRCR SERPLBLD CKD-EPI 2021: 60.8 ML/MIN/1.73
EOSINOPHIL # BLD AUTO: 0.08 10*3/MM3 (ref 0–0.4)
EOSINOPHIL NFR BLD AUTO: 1.3 % (ref 0.3–6.2)
ERYTHROCYTE [DISTWIDTH] IN BLOOD BY AUTOMATED COUNT: 13.1 % (ref 12.3–15.4)
GLOBULIN UR ELPH-MCNC: 2.9 GM/DL
GLUCOSE SERPL-MCNC: 84 MG/DL (ref 65–99)
HCT VFR BLD AUTO: 40.7 % (ref 34–46.6)
HGB BLD-MCNC: 13.1 G/DL (ref 12–15.9)
IMM GRANULOCYTES # BLD AUTO: 0.08 10*3/MM3 (ref 0–0.05)
IMM GRANULOCYTES NFR BLD AUTO: 1.3 % (ref 0–0.5)
LYMPHOCYTES # BLD AUTO: 1.65 10*3/MM3 (ref 0.7–3.1)
LYMPHOCYTES NFR BLD AUTO: 27.8 % (ref 19.6–45.3)
MCH RBC QN AUTO: 31.2 PG (ref 26.6–33)
MCHC RBC AUTO-ENTMCNC: 32.2 G/DL (ref 31.5–35.7)
MCV RBC AUTO: 96.9 FL (ref 79–97)
MONOCYTES # BLD AUTO: 0.56 10*3/MM3 (ref 0.1–0.9)
MONOCYTES NFR BLD AUTO: 9.4 % (ref 5–12)
NEUTROPHILS NFR BLD AUTO: 3.51 10*3/MM3 (ref 1.7–7)
NEUTROPHILS NFR BLD AUTO: 59.4 % (ref 42.7–76)
NRBC BLD AUTO-RTO: 0 /100 WBC (ref 0–0.2)
PLATELET # BLD AUTO: 181 10*3/MM3 (ref 140–450)
PMV BLD AUTO: 10.5 FL (ref 6–12)
POTASSIUM SERPL-SCNC: 4 MMOL/L (ref 3.5–5.2)
PROT SERPL-MCNC: 7.4 G/DL (ref 6–8.5)
RBC # BLD AUTO: 4.2 10*6/MM3 (ref 3.77–5.28)
SODIUM SERPL-SCNC: 140 MMOL/L (ref 136–145)
WBC NRBC COR # BLD AUTO: 5.93 10*3/MM3 (ref 3.4–10.8)

## 2024-08-27 PROCEDURE — 36415 COLL VENOUS BLD VENIPUNCTURE: CPT

## 2024-08-27 PROCEDURE — 3078F DIAST BP <80 MM HG: CPT | Performed by: INTERNAL MEDICINE

## 2024-08-27 PROCEDURE — 85025 COMPLETE CBC W/AUTO DIFF WBC: CPT | Performed by: INTERNAL MEDICINE

## 2024-08-27 PROCEDURE — 80053 COMPREHEN METABOLIC PANEL: CPT | Performed by: INTERNAL MEDICINE

## 2024-08-27 PROCEDURE — 1126F AMNT PAIN NOTED NONE PRSNT: CPT | Performed by: INTERNAL MEDICINE

## 2024-08-27 PROCEDURE — 99213 OFFICE O/P EST LOW 20 MIN: CPT | Performed by: INTERNAL MEDICINE

## 2024-08-27 PROCEDURE — 3074F SYST BP LT 130 MM HG: CPT | Performed by: INTERNAL MEDICINE

## 2024-08-27 RX ORDER — SOLIFENACIN SUCCINATE 5 MG/1
TABLET, FILM COATED ORAL DAILY
COMMUNITY

## 2024-08-27 RX ORDER — SULFAMETHOXAZOLE/TRIMETHOPRIM 800-160 MG
1 TABLET ORAL 2 TIMES DAILY
COMMUNITY

## 2024-08-27 NOTE — LETTER
August 27, 2024     JOS[facility] ([place])  [address]  [address]  [facility] [address]    Patient: Sera Salas   YOB: 1955   Date of Visit: 8/27/2024     Dear Milena Goff) JOSÉ Vital:       Thank you for referring Sera Salas to me for evaluation. Below are the relevant portions of my assessment and plan of care.    If you have questions, please do not hesitate to call me. I look forward to following Sera along with you.         Sincerely,        Christiano Jones MD        CC: MD Karen Monge Michael D., MD  08/27/24 2329  Sign when Signing Visit        REASON FOR FOLLOW-UP: Acute left lower extremity DVT    History of Present Illness   The patient is a 67-year-old female followed by primary care with hypercalcemia (hyperparathyroidism), glucose intolerance, asthma, hyperlipidemia, hypertension, and coronary artery disease as well as chronic back pain as well as coronary artery disease.  She was seen by cardiology 7/22/2021 having undergone stress testing and was felt to be stable.    She had follow-up with ENT 9/24 having undergone a hemithyroidectomy with removal of this  portion of the left thyroid lobe, parathyroidectomy for parathyroid adenoma.  The pathology is not immediately available for review.  The patient states she is also undergoing assessment for hearing loss by Dr. Jeevan Sharma with an MRI plan to rule out acoustic neuroma.  Additionally she has had previous colonoscopy in the last several years without abnormalities noted.         Following her surgery she had left calf pain for approximately week and was seen [facility] 10/1/2021 with evidence of acute, occlusive DVT in the popliteal, posterior tibial and peroneal veins.  The patient states that this DVT development began 1 day post surgery.  She had been without any discomfort or swelling in the extremity prior to the procedure and compression devices were used perioperatively.  She  was placed on Xarelto starter pack and has since transitioned to 20 mg daily.  She states, at the time of this consultation, that she no longer is swelling in the extremity and feels well.  There is no family history that she is aware of any thrombotic tendencies.    Please note that subsequent history taken from the patient indicate that her brother had a similar issue per thrombosis bringing up the clear possibility of an inherited tendency for thrombosis    The patient returns for testing obtained 11/16/2021 with heterozygous findings for factor V Leiden, no prothrombin gene mutation, negative lupus anticoagulant testing, lupus anticoagulant found to be positive, beta-2 glycoprotein studies negative, Factor VIII of 116, normal homocysteine.  The patient indicates that she has been advised by her ENT physician that she remains hypercalcemic, unexpectedly so, may require more surgery,    The patient is next seen 3/4/2022 indicating that insurance company would not cover her factor V Leiden prothrombin gene mutation which by history were of concern (factor V Leiden found to be heterozygous positive).  Of greater concern would be a consistently positive lupus anticoagulant though the patient has clearly responded to her Xarelto therapy with subsequent Doppler examination 2/18/2021 demonstrating improvement in only chronic left lower thrombosis in the popliteal noted.  Again this is evidence the patient is being properly treated for her factor V Leiden mutation and subsequent thrombosis.    The patient is next seen in office 12/19/2022 indicating that she is undergoing additional surgery via ENT for removal of tissue related to her goiter.  Unfortunately we are unable to review those results though a CTA of chest 6/21/2022 when she presented with left sided pleuritic chest pain reveals a large left paratracheal lesion that was unchanged from 6/9/2022 and was felt to be either metastatic lymphadenopathy or  intrathoracic goiter.  The patient held her Xarelto dosing perioperatively and, fortunately, did not subsequently have any thrombotic complications.    The patient is next seen 6/26/2023 having developed swelling in her right lower extremity that been progressively worsening.  She has been evaluated by orthopedics and felt to have ligamental weakening in her right knee though she indicates both the upper calf region and the knee have slowly been enlarging in size.  Considering her history and low-dose anticoagulant we have discussed that a reassessment via repeat Doppler examination of her lower extremities.    The patient is seen 7/5/2023.  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    The patient has continued her prophylactic anticoagulation and states that she has been unable to complete surgical therapy for hyperparathyroidism as result of surgical bleeding.    Patient is seen back with plans to see her ENT in the near future.  A follow-up bone density was obtained 8/21/2023 which demonstrates modest worsening in terms of developing osteopenia as compared to previous.  She has had no issues with thrombotic or hemorrhagic complications on low-dose Xarelto which she is willing to continue.  Please note that also in the interval since she was last seen she underwent repair of right rotator cuff successfully without any hemorrhagic or thrombotic complication.    The patient is next seen 8/27/2024 with normal CBC, CMP pending radiologic testing in June with superior mediastinal mass noted on chest x-ray and CT of neck showing surgical absence of the left lobe of the thyroid, stable enhancing superior mediastinal mass with local mass effect measuring 2.9 x 4.8 x 4.8-with also evidence of parathyroid adenoma though ectopic thyroid tissue with nodular goiter could have the same appearance.  The patient states  that she is seeing ENT concerning this and has now been referred to endocrinology as well.    Past Medical History:   Diagnosis Date   • Allergic    • Anxiety    • Arthritis    • Asthma    • Bleeding disorder     no fefinitive diagnosis  hematologist following and needs further testing   • CAD (coronary artery disease)    • Disease of thyroid gland    • DVT, lower extremity, distal, acute, unspecified laterality     on med   • Frequent UTI     wears pads   • H/O bone density study DUE   • H/O complete eye exam DUE   • HL (hearing loss) may 2021   • Hx of benign neoplasm of parathyroid gland     left side   • Hyperlipidemia    • Hypertension    • On continuous oral anticoagulation     xarelto   • Parathyroid gland disorder     has 1parathyroid with mass and is waiting gor surgery to remove   • Pneumonia oct 2023   • Seasonal allergies    • Shoulder pain     right rotator cuff tear        Past Surgical History:   Procedure Laterality Date   •  SECTION  ,    • COLONOSCOPY     • MAMMO BILATERAL  DUE   • PAP SMEAR  DUE   • PARATHYROIDECTOMY Left     x1   • SHOULDER ARTHROSCOPY W/ ROTATOR CUFF REPAIR Right 2023    Procedure: SHOULDER ARTHROSCOPY WITH ROTATOR CUFF REPAIR, INJECTION RIGHT KNEE;  Surgeon: Zachary William MD;  [facility]: [facility];  Service: Orthopedics;  Laterality: Right;   • THYROID SURGERY Left     partial   • TUBAL ABDOMINAL LIGATION          Current Outpatient Medications on File Prior to Visit   Medication Sig Dispense Refill   • albuterol (PROVENTIL HFA;VENTOLIN HFA) 108 (90 BASE) MCG/ACT inhaler Inhale 2 puffs Every 4 (Four) Hours As Needed for Wheezing.     • amLODIPine (NORVASC) 2.5 MG tablet Take 1 tablet by mouth Daily. 90 tablet 3   • azelastine (ASTELIN) 0.1 % nasal spray 2 sprays into the nostril(s) as directed by provider 2 (Two) Times a Day. Use in each nostril as directed     • cetirizine (ZyrTEC) 10 MG tablet Take 1 tablet by mouth Every Morning.     •  fenofibrate 160 MG tablet TAKE 1 TABLET BY MOUTH DAILY 90 tablet 3   • fluticasone-salmeterol (ADVAIR HFA) 230-21 MCG/ACT inhaler Inhale 2 puffs 2 (Two) Times a Day.     • losartan-hydrochlorothiazide (HYZAAR) 50-12.5 MG per tablet Take 1 tablet by mouth Daily. 90 tablet 3   • montelukast (SINGULAIR) 10 MG tablet Take 1 tablet by mouth Every Morning.     • [DISCONTINUED] rivaroxaban (Xarelto) 10 MG tablet Take 1 tablet by mouth Daily. 28 tablet 0   • predniSONE (DELTASONE) 20 MG tablet Take 1 tablet by mouth Daily. (Patient not taking: Reported on 8/27/2024) 10 tablet 0   • solifenacin (VESICARE) 5 MG tablet Take  by mouth Daily.     • sulfamethoxazole-trimethoprim (BACTRIM DS,SEPTRA DS) 800-160 MG per tablet Take 1 tablet by mouth 2 (Two) Times a Day.       No current facility-administered medications on file prior to visit.        ALLERGIES:  No Known Allergies     Social History     Socioeconomic History   • Marital status:      Spouse name: Marlon   • Number of children: 2   • Years of education: High school   Tobacco Use   • Smoking status: Never   • Smokeless tobacco: Never   • Tobacco comments:     caffeine use 1-2 sodas daily   Vaping Use   • Vaping status: Never Used   Substance and Sexual Activity   • Alcohol use: No   • Drug use: No   • Sexual activity: Yes     Partners: Male     Birth control/protection: None, Tubal ligation        Family History   Problem Relation Age of Onset   • Stroke Mother    • Arthritis Mother    • Heart disease Brother    • Hypertension Brother    • Diabetes Brother    • Heart disease Brother    • Heart disease Other    • Hypertension Other    • Stroke Other    • Malig Hyperthermia Neg Hx         Review of Systems   Constitutional:  Negative for activity change, fatigue, fever and unexpected weight change.   HENT:  Positive for tinnitus.         See history of present illness   Eyes: Negative.    Respiratory:  Positive for cough (Increased general secretions including  "mucus).    Cardiovascular: Negative.    Gastrointestinal: Negative.    Genitourinary: Negative.    Musculoskeletal: Negative.    Skin:  Negative for color change.   Allergic/Immunologic: Negative.    Neurological: Negative.    Psychiatric/Behavioral: Negative.          Objective    Vitals:    08/27/24 1345   BP: 122/72   Pulse: 68   Resp: 14   Temp: 98.5 °F (36.9 °C)   SpO2: 94%   Weight: 69.2 kg (152 lb 8 oz)   Height: 163.8 cm (64.49\")   PainSc: 0-No pain           8/27/2024     1:45 PM   Current Status   ECOG score 0       Physical Exam  Constitutional:       Appearance: Normal appearance.   HENT:      Nose: Nose normal.      Mouth/Throat:      Mouth: Mucous membranes are moist.      Pharynx: Oropharynx is clear.   Eyes:      Extraocular Movements: Extraocular movements intact.      Conjunctiva/sclera: Conjunctivae normal.      Pupils: Pupils are equal, round, and reactive to light.   Cardiovascular:      Rate and Rhythm: Normal rate and regular rhythm.      Pulses: Normal pulses.      Heart sounds: Normal heart sounds.      Comments: Anterior chest incision site well-healing  Pulmonary:      Effort: Pulmonary effort is normal.      Breath sounds: Normal breath sounds.   Abdominal:      General: Bowel sounds are normal.      Palpations: Abdomen is soft.   Musculoskeletal:         General: No swelling or tenderness. Normal range of motion.      Cervical back: Normal range of motion and neck supple.      Right lower leg: Edema (Of her Recent right knee swollen mild tenderness left knee 1+) present.      Left lower leg: Edema (Trace edema) present.   Skin:     General: Skin is warm.   Neurological:      General: No focal deficit present.      Mental Status: She is alert and oriented to person, place, and time. Mental status is at baseline.           RECENT LABS:  Hematology WBC   Date Value Ref Range Status   08/27/2024 5.93 3.40 - 10.80 10*3/mm3 Final   11/17/2023 4.58 3.40 - 10.80 10*3/mm3 Final   10/27/2023 6.55 " 4.5 - 11.0 10*3/uL Final     RBC   Date Value Ref Range Status   08/27/2024 4.20 3.77 - 5.28 10*6/mm3 Final   11/17/2023 4.48 3.77 - 5.28 10*6/mm3 Final   10/27/2023 3.87 (L) 4.0 - 5.2 10*6/uL Final     Hemoglobin   Date Value Ref Range Status   08/27/2024 13.1 12.0 - 15.9 g/dL Final   10/27/2023 11.8 (L) 12.0 - 16.0 g/dL Final     Hematocrit   Date Value Ref Range Status   08/27/2024 40.7 34.0 - 46.6 % Final   10/27/2023 35.7 (L) 36.0 - 46.0 % Final     Platelets   Date Value Ref Range Status   08/27/2024 181 140 - 450 10*3/mm3 Final   10/27/2023 169 140 - 440 10*3/uL Final          Assessment & Plan     68-year-old female followed with a medical history of asthma, hyperlipidemia, hypertension and potentially coronary artery disease with recent development of hypercalcemia secondary to hyperparathyroidism.  She underwent a hemithyroidectomy with removal of this portion of the left thyroid gland, parathyroidectomy for parathyroid adenoma.  As we review the pathology results she, additionally, had noted hearing loss and is to have an MRI to rule out acoustic neuroma.  Her other screening has been negative.  Following her surgery unexpectedly within 24 hours she developed a swollen left lower extremity and was found to have evidence of acute, occlusive DVT in the popliteal, posterior tibial and peroneal veins.  She was placed on and is responding to Xarelto anticoagulation.     We have discussed that there was some provocation for this, albeit very modest, and that 1 would want to assess her hypercoagulable status though do this at a point when she is off Xarelto since this would confuse some of the results.  After additional discussion we proceeded with a chest x-ray that was normal and laboratory testing with heterozygous findings for factor V Leiden, no prothrombin gene mutation, negative lupus anticoagulant testing, lupus anticoagulant found to be positive, beta-2 glycoprotein studies negative, Factor VIII of 116,  normal homocysteine.  The patient indicates that she has been advised by her ENT physician that she remains hypercalcemic, unexpectedly so, may require more surgery.      She did, incidentally, hold her Xarelto prior to retesting for lupus anticoagulant.  The patient is reassessed 11/23/2021 we have discussed rechecking her lupus anticoagulant 12 weeks to be certain of whether this continues to be elevated and potentially satisfies criteria for antiphospholipid antibody syndrome.  The patient currently continues to respond to Xarelto.     The patient continue Xarelto and we had plan to try to obtain repeat lupus anticoagulant but, fortunately, after patient requested a reassessment before these tests were done, find that the patient has responded with a clear improvement in her left lower extremity and only residual chronic popliteal thrombosis.  After further discussion we provide additional information so that her insurance company could make an informed decision.  Fortunately they did see that her testing was indeed necessary though we do not have an additional lupus anticoagulant exam.  Further we had her continue prophylactic Xarelto which she has continued since last visit in which was used successfully in and around additional surgery for an thoracic goiter.  She is seen 12/19/2022 and has not had any thrombotic complications and is urged to continue prophylactic Xarelto.    The patient is next seen 6/26/2023 having developed swelling in her right lower extremity that been progressively worsening.  She has been evaluated by orthopedics and felt to have ligamental weakening in her right knee though she indicates both the upper calf region and the knee have slowly been enlarging in size.  Considering her history and low-dose anticoagulant we have discussed that a reassessment via repeat Doppler examination of her lower extremities.    We went on to continue Xeloda at prophylactic dosing and the patient  underwent repeat Doppler examination showing chronic left lower extremity DVT in the popliteal, deep valvular incompetence and left popliteal.    The patient has continued her prophylactic anticoagulation and states that she has been unable to complete surgical therapy for hyperparathyroidism as result of surgical bleeding.  It is not clear that she needs to undergo parathyroid surgery further though we will recheck her bone density to be certain of her status.    Patient is seen back with plans to see her ENT in the near future.  A follow-up bone density was obtained 8/21/2023 which demonstrates modest worsening in terms of developing osteopenia as compared to previous.  She has had no issues with thrombotic or hemorrhagic complications on low-dose Xarelto which she is willing to continue.  Please note that also in the interval since she was last seen she underwent repair of right rotator cuff successfully without any hemorrhagic or thrombotic complication.    The patient is next seen 8/27/2024 with normal CBC, CMP pending radiologic testing in June with superior mediastinal mass noted on chest x-ray and CT of neck showing surgical absence of the left lobe of the thyroid, stable enhancing superior mediastinal mass with local mass effect measuring 2.9 x 4.8 x 4.8-with also evidence of parathyroid adenoma though ectopic thyroid tissue with nodular goiter could have the same appearance.  The patient states that she is seeing ENT concerning this and has now been referred to endocrinology as well.       Plan:  *Xarelto to continue at current dosing, provide samples as possible    *Plans for follow-up with ENT    *1 year follow-up MD, CBC, CMP.

## 2024-10-17 ENCOUNTER — TELEPHONE (OUTPATIENT)
Dept: ONCOLOGY | Facility: CLINIC | Age: 69
End: 2024-10-17
Payer: MEDICARE

## 2024-10-17 NOTE — TELEPHONE ENCOUNTER
Caller: Sera Salas    Relationship to patient: Self    Best call back number: [phone]    Chief complaint: PATIENT CALLED TO RESCHEDULE     Type of visit: VITALS     If rescheduling, when is the original appointment: 10-21-24

## 2024-11-12 ENCOUNTER — OFFICE VISIT (OUTPATIENT)
Dept: FAMILY MEDICINE CLINIC | Facility: CLINIC | Age: 69
End: 2024-11-12
Payer: MEDICARE

## 2024-11-12 VITALS
WEIGHT: 150.4 LBS | DIASTOLIC BLOOD PRESSURE: 78 MMHG | TEMPERATURE: 98 F | OXYGEN SATURATION: 97 % | SYSTOLIC BLOOD PRESSURE: 140 MMHG | BODY MASS INDEX: 25.68 KG/M2 | HEIGHT: 64 IN | HEART RATE: 67 BPM | RESPIRATION RATE: 16 BRPM

## 2024-11-12 DIAGNOSIS — E78.2 MIXED HYPERLIPIDEMIA: ICD-10-CM

## 2024-11-12 DIAGNOSIS — Z00.00 MEDICARE ANNUAL WELLNESS VISIT, INITIAL: Primary | ICD-10-CM

## 2024-11-12 DIAGNOSIS — I10 ESSENTIAL HYPERTENSION: ICD-10-CM

## 2024-11-12 PROCEDURE — 3078F DIAST BP <80 MM HG: CPT | Performed by: NURSE PRACTITIONER

## 2024-11-12 PROCEDURE — 3077F SYST BP >= 140 MM HG: CPT | Performed by: NURSE PRACTITIONER

## 2024-11-12 PROCEDURE — 99213 OFFICE O/P EST LOW 20 MIN: CPT | Performed by: NURSE PRACTITIONER

## 2024-11-12 PROCEDURE — 1126F AMNT PAIN NOTED NONE PRSNT: CPT | Performed by: NURSE PRACTITIONER

## 2024-11-12 PROCEDURE — 1170F FXNL STATUS ASSESSED: CPT | Performed by: NURSE PRACTITIONER

## 2024-11-12 PROCEDURE — G0438 PPPS, INITIAL VISIT: HCPCS | Performed by: NURSE PRACTITIONER

## 2024-11-12 PROCEDURE — 1159F MED LIST DOCD IN RCRD: CPT | Performed by: NURSE PRACTITIONER

## 2024-11-12 PROCEDURE — 1160F RVW MEDS BY RX/DR IN RCRD: CPT | Performed by: NURSE PRACTITIONER

## 2024-11-12 RX ORDER — LOSARTAN POTASSIUM 50 MG/1
50 TABLET ORAL DAILY
COMMUNITY
Start: 2024-10-24

## 2024-11-12 RX ORDER — AMLODIPINE BESYLATE 2.5 MG/1
2.5 TABLET ORAL DAILY
Qty: 90 TABLET | Refills: 3 | Status: SHIPPED | OUTPATIENT
Start: 2024-11-12

## 2024-11-12 RX ORDER — FENOFIBRATE 160 MG/1
160 TABLET ORAL DAILY
Qty: 90 TABLET | Refills: 3 | Status: SHIPPED | OUTPATIENT
Start: 2024-11-12

## 2024-11-12 NOTE — PROGRESS NOTES
Subjective   The ABCs of the Annual Wellness Visit  Medicare Wellness Visit      Sera Salas is a 69 y.o. patient who presents for a Medicare Wellness Visit.    The following portions of the patient's history were reviewed and   updated as appropriate: allergies, current medications, past family history, past medical history, past social history, past surgical history, and problem list.    Compared to one year ago, the patient's physical   health is the same.  Compared to one year ago, the patient's mental   health is the same.    Recent Hospitalizations:  She was not admitted to the hospital during the last year.     Current Medical Providers:  Patient Care Team:  Milena Vital APRN ([place]) as PCP - General (Family Medicine)  Milena Vital APRN ([place]) as Referring Physician (Family Medicine)  Christiano Jones MD as Consulting Physician (Hematology and Oncology)    Outpatient Medications Prior to Visit   Medication Sig Dispense Refill    albuterol (PROVENTIL HFA;VENTOLIN HFA) 108 (90 BASE) MCG/ACT inhaler Inhale 2 puffs Every 4 (Four) Hours As Needed for Wheezing.      azelastine (ASTELIN) 0.1 % nasal spray Administer 2 sprays into the nostril(s) as directed by provider 2 (Two) Times a Day. Use in each nostril as directed      cetirizine (ZyrTEC) 10 MG tablet Take 1 tablet by mouth Every Morning.      Cholecalciferol 50 MCG (2000 UT) tablet Take 1 tablet by mouth Daily.      fluticasone-salmeterol (ADVAIR HFA) 230-21 MCG/ACT inhaler Inhale 2 puffs 2 (Two) Times a Day.      losartan (COZAAR) 50 MG tablet Take 1 tablet by mouth Daily.      montelukast (SINGULAIR) 10 MG tablet Take 1 tablet by mouth Every Morning.      rivaroxaban (Xarelto) 10 MG tablet Take 1 tablet by mouth Daily. 28 tablet 0    solifenacin (VESICARE) 5 MG tablet Take  by mouth Daily.      sulfamethoxazole-trimethoprim (BACTRIM DS,SEPTRA DS) 800-160 MG per tablet Take 1 tablet by mouth 2 (Two) Times a Day.      amLODIPine (NORVASC) 2.5 MG  "tablet Take 1 tablet by mouth Daily. 90 tablet 3    fenofibrate 160 MG tablet TAKE 1 TABLET BY MOUTH DAILY 90 tablet 3    predniSONE (DELTASONE) 20 MG tablet Take 1 tablet by mouth Daily. 10 tablet 0    losartan-hydrochlorothiazide (HYZAAR) 50-12.5 MG per tablet Take 1 tablet by mouth Daily. (Patient not taking: Reported on 11/12/2024) 90 tablet 3     No facility-administered medications prior to visit.     No opioid medication identified on active medication list. I have reviewed chart for other potential  high risk medication/s and harmful drug interactions in the elderly.      Aspirin is not on active medication list.  Aspirin use is contraindicated for this patient due to: current use of Xarelto.  .    Patient Active Problem List   Diagnosis    Essential hypertension    Hyperlipidemia    Moderate persistent asthma without complication    DVT, lower extremity, distal, acute, unspecified laterality    Chronic right shoulder pain    Parathyroid gland disorder     Advance Care Planning Advance Directive is not on file.  ACP discussion was held with the patient during this visit. Patient does not have an advance directive, information provided.            Objective   Vitals:    11/12/24 0924   BP: 140/78   Pulse: 67   Resp: 16   Temp: 98 °F (36.7 °C)   SpO2: 97%   Weight: 68.2 kg (150 lb 6.4 oz)   Height: 163.8 cm (64.49\")   PainSc: 0-No pain       Estimated body mass index is 25.43 kg/m² as calculated from the following:    Height as of this encounter: 163.8 cm (64.49\").    Weight as of this encounter: 68.2 kg (150 lb 6.4 oz).    BMI is >= 25 and <30. (Overweight) The following options were offered after discussion;: nutrition counseling/recommendations         Gait and Balance Evaluation:  Normal  Does the patient have evidence of cognitive impairment? No                                                                                                Health  Risk Assessment    Smoking Status:  Social History "     Tobacco Use   Smoking Status Never   Smokeless Tobacco Never   Tobacco Comments    caffeine use 1-2 sodas daily     Alcohol Consumption:  Social History     Substance and Sexual Activity   Alcohol Use No       Fall Risk Screen  STEADI Fall Risk Assessment was completed, and patient is at LOW risk for falls.Assessment completed on:2024    Depression Screening   Little interest or pleasure in doing things? Not at all   Feeling down, depressed, or hopeless? Not at all   PHQ-2 Total Score 0      Health Habits and Functional and Cognitive Screenin/12/2024     9:00 AM   Functional & Cognitive Status   Do you have difficulty preparing food and eating? No   Do you have difficulty bathing yourself, getting dressed or grooming yourself? No   Do you have difficulty using the toilet? No   Do you have difficulty moving around from place to place? No   Do you have trouble with steps or getting out of a bed or a chair? No   Current Diet Other   Dental Exam Up to date   Eye Exam Up to date   Exercise (times per week) 0 times per week   Current Exercises Include No Regular Exercise   Do you need help using the phone?  No   Are you deaf or do you have serious difficulty hearing?  No   Do you need help to go to places out of walking distance? No   Do you need help shopping? No   Do you need help preparing meals?  No   Do you need help with housework?  No   Do you need help with laundry? No   Do you need help taking your medications? No   Do you need help managing money? No   Do you ever drive or ride in a car without wearing a seat belt? No   Have you felt unusual stress, anger or loneliness in the last month? No   Who do you live with? Spouse   If you need help, do you have trouble finding someone available to you? No   Have you been bothered in the last four weeks by sexual problems? No   Do you have difficulty concentrating, remembering or making decisions? No           Visual Acuity:  No results  "found.  Age-appropriate Screening Schedule:  Refer to the list below for future screening recommendations based on patient's age, sex and/or medical conditions. Orders for these recommended tests are listed in the plan section. The patient has been provided with a written plan.    Health Maintenance List  Health Maintenance   Topic Date Due    Pneumococcal Vaccine 65+ (1 of 2 - PCV) Never done    ANNUAL WELLNESS VISIT  Never done    BMI FOLLOWUP  03/01/2024    INFLUENZA VACCINE  08/01/2024    COVID-19 Vaccine (6 - 2024-25 season) 09/01/2024    LIPID PANEL  11/17/2024    TDAP/TD VACCINES (2 - Td or Tdap) 03/01/2025    MAMMOGRAM  05/09/2025    DXA SCAN  08/21/2025    PAP SMEAR  05/08/2026    COLORECTAL CANCER SCREENING  03/27/2027    ZOSTER VACCINE  Completed    HEPATITIS C SCREENING  Addressed                                                                                                                                                CMS Preventative Services Quick Reference  Risk Factors Identified During Encounter  None Identified    The above risks/problems have been discussed with the patient.  Pertinent information has been shared with the patient in the After Visit Summary.  An After Visit Summary and PPPS were made available to the patient.    Follow Up:   Next Medicare Wellness visit to be scheduled in 1 year.         Additional E&M Note during same encounter follows:  Patient has additional, significant, and separately identifiable condition(s)/problem(s) that require work above and beyond the Medicare Wellness Visit     Chief Complaint  Medicare Wellness-Initial Visit, Hypertension, and Hyperlipidemia    Subjective   HPI  CESAR is also being seen today for additional medical problem/s.                Objective   Vital Signs:  /78   Pulse 67   Temp 98 °F (36.7 °C)   Resp 16   Ht 163.8 cm (64.49\")   Wt 68.2 kg (150 lb 6.4 oz)   SpO2 97%   BMI 25.43 kg/m²   Physical Exam    The following data was " reviewed by: JOSÉ Lockett on 11/12/2024:  Data reviewed : Consultant notes endocrinology  Common labs          8/27/2024    13:40   Common Labs   Glucose 84    BUN 22    Creatinine 1.02    Sodium 140    Potassium 4.0    Chloride 104    Calcium 10.7    Albumin 4.5    Total Bilirubin 0.6    Alkaline Phosphatase 75    AST (SGOT) 20    ALT (SGPT) 22    WBC 5.93    Hemoglobin 13.1    Hematocrit 40.7    Platelets 181              Assessment and Plan            Medicare annual wellness visit, initial         Mixed hyperlipidemia   Lipid abnormalities are improving with treatment    Plan:  Continue same medication/s without change.      Discussed medication dosage, use, side effects, and goals of treatment in detail.    Counseled patient on lifestyle modifications to help control hyperlipidemia.     Patient Treatment Goals:   LDL goal is under 100    Followup in 1 year.    Orders:    amLODIPine (NORVASC) 2.5 MG tablet; Take 1 tablet by mouth Daily.    fenofibrate 160 MG tablet; Take 1 tablet by mouth Daily.    Lipid panel    Essential hypertension  Hypertension is stable and controlled  Continue current treatment regimen.  Blood pressure will be reassessed in 1 year.    Orders:    amLODIPine (NORVASC) 2.5 MG tablet; Take 1 tablet by mouth Daily.    fenofibrate 160 MG tablet; Take 1 tablet by mouth Daily.     She is having sinus surgery with Dr. Yan.  She was referred to endocrinology for hyperparathyroidism.  She was started on vit D. Her losartan-hctz was changed to losartan. She reports BP at home is running 120s-130s/80s.       Follow Up   Return in about 1 year (around 11/12/2025) for Next scheduled follow up.  Patient was given instructions and counseling regarding her condition or for health maintenance advice. Please see specific information pulled into the AVS if appropriate.

## 2024-11-12 NOTE — ASSESSMENT & PLAN NOTE
Lipid abnormalities are improving with treatment    Plan:  Continue same medication/s without change.      Discussed medication dosage, use, side effects, and goals of treatment in detail.    Counseled patient on lifestyle modifications to help control hyperlipidemia.     Patient Treatment Goals:   LDL goal is under 100    Followup in 1 year.    Orders:    amLODIPine (NORVASC) 2.5 MG tablet; Take 1 tablet by mouth Daily.    fenofibrate 160 MG tablet; Take 1 tablet by mouth Daily.    Lipid panel

## 2024-11-12 NOTE — PATIENT INSTRUCTIONS
Medicare Wellness  Personal Prevention Plan of Service     Date of Office Visit:    Encounter Provider:  JOSÉ Lockett  Place of Service:  [facility]  Patient Name: Sera Salas  :  1955    As part of the Medicare Wellness portion of your visit today, we are providing you with this personalized preventive plan of services (PPPS). This plan is based upon recommendations of the [place] Preventive Services Task Force (USPSTF) and the Advisory Committee on Immunization Practices (ACIP).    This lists the preventive care services that should be considered, and provides dates of when you are due. Items listed as completed are up-to-date and do not require any further intervention.    Health Maintenance   Topic Date Due    Pneumococcal Vaccine 65+ (1 of 2 - PCV) Never done    ANNUAL WELLNESS VISIT  Never done    BMI FOLLOWUP  2024    INFLUENZA VACCINE  2024    COVID-19 Vaccine ( - - season) 2024    LIPID PANEL  2024    TDAP/TD VACCINES (2 - Td or Tdap) 2025    MAMMOGRAM  2025    DXA SCAN  2025    PAP SMEAR  2026    COLORECTAL CANCER SCREENING  2027    ZOSTER VACCINE  Completed    HEPATITIS C SCREENING  Addressed       No orders of the defined types were placed in this encounter.      Return in about 1 year (around 2025) for Next scheduled follow up.

## 2024-11-12 NOTE — ASSESSMENT & PLAN NOTE
Hypertension is stable and controlled  Continue current treatment regimen.  Blood pressure will be reassessed in 1 year.    Orders:    amLODIPine (NORVASC) 2.5 MG tablet; Take 1 tablet by mouth Daily.    fenofibrate 160 MG tablet; Take 1 tablet by mouth Daily.

## 2024-11-13 LAB
CHOLEST SERPL-MCNC: 184 MG/DL (ref 100–199)
HDLC SERPL-MCNC: 44 MG/DL
LDLC SERPL CALC-MCNC: 97 MG/DL (ref 0–99)
TRIGL SERPL-MCNC: 252 MG/DL (ref 0–149)
VLDLC SERPL CALC-MCNC: 43 MG/DL (ref 5–40)

## 2025-02-07 ENCOUNTER — TELEPHONE (OUTPATIENT)
Dept: ONCOLOGY | Facility: CLINIC | Age: 70
End: 2025-02-07
Payer: MEDICARE

## 2025-02-07 NOTE — TELEPHONE ENCOUNTER
Called pt regarding her apt on 2/10. Advised her that we do not have any 10mg samples of Xarelto at . She said she did not need samples at this time, she had some supply left from last month. Advised her to call when she is getting low, and we will check our supply. She v/u.

## 2025-04-16 ENCOUNTER — TELEPHONE (OUTPATIENT)
Dept: ONCOLOGY | Facility: CLINIC | Age: 70
End: 2025-04-16
Payer: MEDICARE

## 2025-04-16 NOTE — TELEPHONE ENCOUNTER
Caller: Sera Salas    Relationship to patient: Self    Best call back number [phone]    Type of visit: XARELTO SAMPLES    Requested date: WEEK OF 4/28 OR 5/9    If rescheduling, when is the original appointment: 5/5     Additional notes: OUT OF TOWN 5/2-5/8

## 2025-05-13 ENCOUNTER — RESULTS FOLLOW-UP (OUTPATIENT)
Dept: ONCOLOGY | Facility: CLINIC | Age: 70
End: 2025-05-13
Payer: MEDICARE

## 2025-05-13 ENCOUNTER — HOSPITAL ENCOUNTER (OUTPATIENT)
Dept: CARDIOLOGY | Facility: HOSPITAL | Age: 70
Discharge: HOME | End: 2025-05-13
Admitting: INTERNAL MEDICINE
Payer: MEDICARE

## 2025-05-13 DIAGNOSIS — M79.89 PAIN AND SWELLING OF LEFT LOWER EXTREMITY: Primary | ICD-10-CM

## 2025-05-13 DIAGNOSIS — M79.605 PAIN AND SWELLING OF LEFT LOWER EXTREMITY: ICD-10-CM

## 2025-05-13 DIAGNOSIS — M79.89 PAIN AND SWELLING OF LEFT LOWER EXTREMITY: ICD-10-CM

## 2025-05-13 DIAGNOSIS — M79.605 PAIN AND SWELLING OF LEFT LOWER EXTREMITY: Primary | ICD-10-CM

## 2025-05-13 DIAGNOSIS — M71.22 SYNOVIAL CYST OF LEFT POPLITEAL SPACE: ICD-10-CM

## 2025-05-13 LAB
BH CV LOWER VASCULAR LEFT COMMON FEMORAL AUGMENT: NORMAL
BH CV LOWER VASCULAR LEFT COMMON FEMORAL COMPETENT: NORMAL
BH CV LOWER VASCULAR LEFT COMMON FEMORAL COMPRESS: NORMAL
BH CV LOWER VASCULAR LEFT COMMON FEMORAL PHASIC: NORMAL
BH CV LOWER VASCULAR LEFT COMMON FEMORAL SPONT: NORMAL
BH CV LOWER VASCULAR LEFT DISTAL FEMORAL COMPRESS: NORMAL
BH CV LOWER VASCULAR LEFT GASTRONEMIUS COMPRESS: NORMAL
BH CV LOWER VASCULAR LEFT GREATER SAPH AK COMPRESS: NORMAL
BH CV LOWER VASCULAR LEFT GREATER SAPH BK COMPRESS: NORMAL
BH CV LOWER VASCULAR LEFT LESSER SAPH COMPRESS: NORMAL
BH CV LOWER VASCULAR LEFT MID FEMORAL AUGMENT: NORMAL
BH CV LOWER VASCULAR LEFT MID FEMORAL COMPETENT: NORMAL
BH CV LOWER VASCULAR LEFT MID FEMORAL COMPRESS: NORMAL
BH CV LOWER VASCULAR LEFT MID FEMORAL PHASIC: NORMAL
BH CV LOWER VASCULAR LEFT MID FEMORAL SPONT: NORMAL
BH CV LOWER VASCULAR LEFT PERONEAL COMPRESS: NORMAL
BH CV LOWER VASCULAR LEFT POSTERIOR TIBIAL COMPRESS: NORMAL
BH CV LOWER VASCULAR LEFT PROFUNDA FEMORAL COMPRESS: NORMAL
BH CV LOWER VASCULAR LEFT PROXIMAL FEMORAL COMPRESS: NORMAL
BH CV LOWER VASCULAR LEFT SAPHENOFEMORAL JUNCTION COMPRESS: NORMAL
BH CV LOWER VASCULAR RIGHT COMMON FEMORAL AUGMENT: NORMAL
BH CV LOWER VASCULAR RIGHT COMMON FEMORAL COMPETENT: NORMAL
BH CV LOWER VASCULAR RIGHT COMMON FEMORAL COMPRESS: NORMAL
BH CV LOWER VASCULAR RIGHT COMMON FEMORAL PHASIC: NORMAL
BH CV LOWER VASCULAR RIGHT COMMON FEMORAL SPONT: NORMAL
BH CV VAS PRELIMINARY FINDINGS SCRIPTING: 1

## 2025-05-13 PROCEDURE — 93971 EXTREMITY STUDY: CPT

## 2025-05-13 NOTE — PROGRESS NOTES
Pt called from urgent care center stating that her left lower extremity was swollen and painful and she feels a knot in her leg. She was on vacation last week and feel like she may have over exerted herself. She is currently on Xarelto 10mg daily and has not missed any doses. D/W Dr. Jones and we can order a doppler of her left lower extremity today, stat hold and call to Dr. Jones. Informed pt we would be calling her to set this up. She v/u.

## 2025-05-13 NOTE — PROGRESS NOTES
This patient has a Baker's cyst that we will likely need drainage by orthopedics.  Please refer back to Dr. William.  SCOOBY

## 2025-05-13 NOTE — TELEPHONE ENCOUNTER
Called pt to let her know results of the doppler. She said she was previously seen by Dr. William for a cyst on her knee and he was able to drain it during surgery. Advised her we will refer back to Dr. William for evaluation. She v/u. Referral placed.

## 2025-05-21 ENCOUNTER — OFFICE VISIT (OUTPATIENT)
Dept: ORTHOPEDIC SURGERY | Facility: CLINIC | Age: 70
End: 2025-05-21
Payer: MEDICARE

## 2025-05-21 VITALS — HEIGHT: 64 IN | TEMPERATURE: 98.4 F | WEIGHT: 157.3 LBS | BODY MASS INDEX: 26.85 KG/M2

## 2025-05-21 DIAGNOSIS — M17.10 ARTHRITIS OF KNEE: ICD-10-CM

## 2025-05-21 DIAGNOSIS — M25.562 LEFT KNEE PAIN, UNSPECIFIED CHRONICITY: Primary | ICD-10-CM

## 2025-05-21 RX ORDER — BUDESONIDE, GLYCOPYRROLATE, AND FORMOTEROL FUMARATE 160; 9; 4.8 UG/1; UG/1; UG/1
AEROSOL, METERED RESPIRATORY (INHALATION)
COMMUNITY

## 2025-05-21 RX ORDER — METHYLPREDNISOLONE 4 MG/1
TABLET ORAL
Qty: 21 TABLET | Refills: 0 | Status: SHIPPED | OUTPATIENT
Start: 2025-05-21

## 2025-05-21 NOTE — PROGRESS NOTES
Patient:Sera Salas    YOB: 1955    Medical Record Number:[id number]    Chief Complaints: Left knee pain    History of Present Illness:     69 y.o. female patient who presents for her left knee.  She went on vacation a few weeks ago and was doing a lot of walking.  This seemed to aggravate her knee.  She had significant pain and swelling after the vacation.  The symptoms are slowly improving.  Current pain is moderate and aching.  Most of the pain seems to be posterior.  Denies any mechanical symptoms or other associated complaints.      Allergies:No Known Allergies    Home Medications:    Current Outpatient Medications:     albuterol (PROVENTIL HFA;VENTOLIN HFA) 108 (90 BASE) MCG/ACT inhaler, Inhale 2 puffs Every 4 (Four) Hours As Needed for Wheezing., Disp: , Rfl:     amLODIPine (NORVASC) 2.5 MG tablet, Take 1 tablet by mouth Daily., Disp: 90 tablet, Rfl: 3    azelastine (ASTELIN) 0.1 % nasal spray, Administer 2 sprays into the nostril(s) as directed by provider 2 (Two) Times a Day. Use in each nostril as directed, Disp: , Rfl:     Breztri Aerosphere 160-9-4.8 MCG/ACT aerosol inhaler, INHALE 2 PUFFS BY MOUTH 2 TIMES A DAY. RINSE MOUTH AFTER USE., Disp: , Rfl:     cetirizine (ZyrTEC) 10 MG tablet, Take 1 tablet by mouth Every Morning., Disp: , Rfl:     Cholecalciferol 50 MCG (2000 UT) tablet, Take 1 tablet by mouth Daily., Disp: , Rfl:     fenofibrate 160 MG tablet, Take 1 tablet by mouth Daily., Disp: 90 tablet, Rfl: 3    losartan (COZAAR) 50 MG tablet, Take 1 tablet by mouth Daily., Disp: , Rfl:     montelukast (SINGULAIR) 10 MG tablet, Take 1 tablet by mouth Every Morning., Disp: , Rfl:     rivaroxaban (Xarelto) 10 MG tablet, Take 1 tablet by mouth Daily., Disp: 28 tablet, Rfl: 0    solifenacin (VESICARE) 5 MG tablet, Take  by mouth Daily., Disp: , Rfl:     Past Medical History:   Diagnosis Date    Allergic     Anxiety     Arthritis     Asthma     Bleeding disorder     no fefinitive diagnosis   hematologist following and needs further testing    CAD (coronary artery disease)     Disease of thyroid gland     DVT, lower extremity, distal, acute, unspecified laterality     on med    Frequent UTI     wears pads    H/O bone density study DUE    H/O complete eye exam DUE    HL (hearing loss) may 2021    Hx of benign neoplasm of parathyroid gland     left side    Hyperlipidemia     Hypertension     On continuous oral anticoagulation     xarelto    Parathyroid gland disorder     has 1parathyroid with mass and is waiting gor surgery to remove    Pneumonia oct 2023    Seasonal allergies     Shoulder pain     right rotator cuff tear       Past Surgical History:   Procedure Laterality Date     SECTION  ,     COLONOSCOPY      MAMMO BILATERAL  DUE    PAP SMEAR  DUE    PARATHYROIDECTOMY Left     x1    SHOULDER ARTHROSCOPY W/ ROTATOR CUFF REPAIR Right 2023    Procedure: SHOULDER ARTHROSCOPY WITH ROTATOR CUFF REPAIR, INJECTION RIGHT KNEE;  Surgeon: Zachary William MD;  Location: [facility];  Service: Orthopedics;  Laterality: Right;    THYROID SURGERY Left     partial    TUBAL ABDOMINAL LIGATION         Social History     Occupational History    Occupation:      Employer: [employer]   Tobacco Use    Smoking status: Never    Smokeless tobacco: Never    Tobacco comments:     caffeine use 1-2 sodas daily   Vaping Use    Vaping status: Never Used   Substance and Sexual Activity    Alcohol use: No    Drug use: No    Sexual activity: Yes     Partners: Male     Birth control/protection: None, Tubal ligation      Social History     Social History Narrative    Not on file       Family History   Problem Relation Age of Onset    Stroke Mother     Arthritis Mother     Heart disease Brother     Hypertension Brother     Diabetes Brother     Heart disease Brother     Heart disease Other     Hypertension Other     Stroke Other     Malig Hyperthermia Neg Hx        Review of Systems:  "     Constitutional: Denies fever, shaking or chills   Eyes: Denies change in visual acuity   HEENT: Denies nasal congestion or sore throat   Respiratory: Denies cough or shortness of breath   Cardiovascular: Denies chest pain or edema  Endocrine: Denies tremors, palpitations, intolerance of heat or cold, polyuria, polydipsia.  GI: Denies abdominal pain, nausea, vomiting, bloody stools or diarrhea  : Denies frequency, urgency, incontinence, retention, or nocturia.  Musculoskeletal: Denies numbness, tingling or loss of motor function except as above  Integument: Denies rash, lesion or ulceration   Neurologic: Denies headache or focal weakness, deficits  Heme: Denies spontaneous or excessive bleeding, epistaxis, hematuria, melena, fatigue, enlarged or tender lymph nodes.      All other pertinent positives and negatives as noted above in HPI.    Physical Exam:69 y.o. female  Vitals:    05/21/25 1244   Temp: 98.4 °F (36.9 °C)   TempSrc: Temporal   Weight: 71.4 kg (157 lb 4.8 oz)   Height: 162.6 cm (64\")       General:  Patient is awake and alert.  Appears in no acute distress or discomfort.    Psych:  Affect and demeanor are appropriate.    Extremities: Left knee is examined.  Skin is benign.  Trace effusion.  She has a Baker's cyst palpable posteriorly.  This is nontender.  She does have both medial and lateral joint line tenderness.  She has full knee extension which is mildly uncomfortable.  She has flexion to 125.  Again, this is mildly uncomfortable.  Medial and lateral Patricia's are both painful.        Imaging: AP, merchant and lateral views left knee are ordered and reviewed to evaluate her complaint.  No comparison films are immediately available.  She appears to have mild degenerative changes.  I do not see any other significant findings.    Her recent duplex ultrasound of the left upper extremities was reviewed.  She has a popliteal cyst consistent with a Baker's cyst.  No other significant " findings.    Assessment/Plan: Left knee Baker's cyst and osteoarthritis with recent aggravation    We talked about the natural history of this condition and her options.  Offered an injection.  We did an injection for her right knee before we did her shoulder surgery and it helped her tremendously.  She says that she is very fearful of the injections and she is not interested in that option at this time.  She asked if there were any alternative options.  Offered PT but she declined.  She was interested in trying a Medrol Dosepak.  Risk of this medicine were discussed.  She will follow-up with me as needed.    Zachary William MD    05/21/2025

## 2025-05-24 ENCOUNTER — PATIENT ROUNDING (BHMG ONLY) (OUTPATIENT)
Dept: ORTHOPEDIC SURGERY | Facility: CLINIC | Age: 70
End: 2025-05-24
Payer: MEDICARE

## 2025-05-25 NOTE — PROGRESS NOTES
May 24, 2025    A IFTTT Message has been sent to the patient for [place] [facility]

## 2025-06-02 ENCOUNTER — TELEPHONE (OUTPATIENT)
Dept: ONCOLOGY | Facility: CLINIC | Age: 70
End: 2025-06-02
Payer: MEDICARE

## 2025-06-02 NOTE — TELEPHONE ENCOUNTER
Caller: Sera Salas    Relationship: Self    Best call back number: [phone]      Who are you requesting to speak with (clinical staff, provider,  specific staff member): ALENA      What was the call regarding: PT REQUESTING CALLBACK FROM ALENA

## 2025-06-02 NOTE — TELEPHONE ENCOUNTER
Left message for patient to call back to RN direct  line, [phone], as Betzy is out of the office this week.

## 2025-06-02 NOTE — TELEPHONE ENCOUNTER
Call to Ms. Salas to return her call.  Patient was upset that she had appointment at [facility] for sample , and she had to come to [facility] office, since there were no Xarelto 10 mg out at [facility] today.  Instructed her to call and check on the day before she is to  samples to be sure we have them, as we do not always have samples, and at times we have them at one location and not the other.  Understanding verbalized.

## 2025-08-26 ENCOUNTER — LAB (OUTPATIENT)
Dept: OTHER | Facility: HOSPITAL | Age: 70
End: 2025-08-26
Payer: MEDICARE

## 2025-08-26 ENCOUNTER — OFFICE VISIT (OUTPATIENT)
Dept: ONCOLOGY | Facility: CLINIC | Age: 70
End: 2025-08-26
Payer: MEDICARE

## 2025-08-26 VITALS
HEART RATE: 54 BPM | DIASTOLIC BLOOD PRESSURE: 66 MMHG | OXYGEN SATURATION: 96 % | HEIGHT: 64 IN | TEMPERATURE: 97.2 F | SYSTOLIC BLOOD PRESSURE: 146 MMHG | WEIGHT: 150.2 LBS | BODY MASS INDEX: 25.64 KG/M2

## 2025-08-26 DIAGNOSIS — I82.492 ACUTE DEEP VEIN THROMBOSIS (DVT) OF OTHER SPECIFIED VEIN OF LEFT LOWER EXTREMITY: ICD-10-CM

## 2025-08-26 DIAGNOSIS — I82.4Z9 DVT, LOWER EXTREMITY, DISTAL, ACUTE, UNSPECIFIED LATERALITY: Primary | ICD-10-CM

## 2025-08-26 LAB
ALBUMIN SERPL-MCNC: 4.6 G/DL (ref 3.5–5.2)
ALBUMIN/GLOB SERPL: 1.5 G/DL
ALP SERPL-CCNC: 87 U/L (ref 39–117)
ALT SERPL W P-5'-P-CCNC: 17 U/L (ref 1–33)
ANION GAP SERPL CALCULATED.3IONS-SCNC: 7.9 MMOL/L (ref 5–15)
AST SERPL-CCNC: 15 U/L (ref 1–32)
BASOPHILS # BLD AUTO: 0.07 10*3/MM3 (ref 0–0.2)
BASOPHILS NFR BLD AUTO: 1.4 % (ref 0–1.5)
BILIRUB SERPL-MCNC: 0.6 MG/DL (ref 0–1.2)
BUN SERPL-MCNC: 21.1 MG/DL (ref 8–23)
BUN/CREAT SERPL: 20.7 (ref 7–25)
CALCIUM SPEC-SCNC: 10.5 MG/DL (ref 8.6–10.5)
CHLORIDE SERPL-SCNC: 106 MMOL/L (ref 98–107)
CO2 SERPL-SCNC: 28.1 MMOL/L (ref 22–29)
CREAT SERPL-MCNC: 1.02 MG/DL (ref 0.57–1)
DEPRECATED RDW RBC AUTO: 48.2 FL (ref 37–54)
EGFRCR SERPLBLD CKD-EPI 2021: 60.4 ML/MIN/1.73
EOSINOPHIL # BLD AUTO: 0.18 10*3/MM3 (ref 0–0.4)
EOSINOPHIL NFR BLD AUTO: 3.6 % (ref 0.3–6.2)
ERYTHROCYTE [DISTWIDTH] IN BLOOD BY AUTOMATED COUNT: 13.4 % (ref 12.3–15.4)
GLOBULIN UR ELPH-MCNC: 3 GM/DL
GLUCOSE SERPL-MCNC: 114 MG/DL (ref 65–99)
HCT VFR BLD AUTO: 41.6 % (ref 34–46.6)
HGB BLD-MCNC: 13.3 G/DL (ref 12–15.9)
IMM GRANULOCYTES # BLD AUTO: 0.05 10*3/MM3 (ref 0–0.05)
IMM GRANULOCYTES NFR BLD AUTO: 1 % (ref 0–0.5)
LYMPHOCYTES # BLD AUTO: 1.62 10*3/MM3 (ref 0.7–3.1)
LYMPHOCYTES NFR BLD AUTO: 32.1 % (ref 19.6–45.3)
MCH RBC QN AUTO: 30.9 PG (ref 26.6–33)
MCHC RBC AUTO-ENTMCNC: 32 G/DL (ref 31.5–35.7)
MCV RBC AUTO: 96.5 FL (ref 79–97)
MONOCYTES # BLD AUTO: 0.47 10*3/MM3 (ref 0.1–0.9)
MONOCYTES NFR BLD AUTO: 9.3 % (ref 5–12)
NEUTROPHILS NFR BLD AUTO: 2.66 10*3/MM3 (ref 1.7–7)
NEUTROPHILS NFR BLD AUTO: 52.6 % (ref 42.7–76)
NRBC BLD AUTO-RTO: 0 /100 WBC (ref 0–0.2)
PLATELET # BLD AUTO: 217 10*3/MM3 (ref 140–450)
PMV BLD AUTO: 10.6 FL (ref 6–12)
POTASSIUM SERPL-SCNC: 3.8 MMOL/L (ref 3.5–5.2)
PROT SERPL-MCNC: 7.6 G/DL (ref 6–8.5)
RBC # BLD AUTO: 4.31 10*6/MM3 (ref 3.77–5.28)
SODIUM SERPL-SCNC: 142 MMOL/L (ref 136–145)
WBC NRBC COR # BLD AUTO: 5.05 10*3/MM3 (ref 3.4–10.8)

## 2025-08-26 PROCEDURE — 3078F DIAST BP <80 MM HG: CPT | Performed by: INTERNAL MEDICINE

## 2025-08-26 PROCEDURE — 1126F AMNT PAIN NOTED NONE PRSNT: CPT | Performed by: INTERNAL MEDICINE

## 2025-08-26 PROCEDURE — 36415 COLL VENOUS BLD VENIPUNCTURE: CPT

## 2025-08-26 PROCEDURE — 99213 OFFICE O/P EST LOW 20 MIN: CPT | Performed by: INTERNAL MEDICINE

## 2025-08-26 PROCEDURE — 80053 COMPREHEN METABOLIC PANEL: CPT | Performed by: INTERNAL MEDICINE

## 2025-08-26 PROCEDURE — 3077F SYST BP >= 140 MM HG: CPT | Performed by: INTERNAL MEDICINE

## 2025-08-26 PROCEDURE — 85025 COMPLETE CBC W/AUTO DIFF WBC: CPT | Performed by: INTERNAL MEDICINE

## 2025-08-27 ENCOUNTER — TELEPHONE (OUTPATIENT)
Dept: ONCOLOGY | Facility: CLINIC | Age: 70
End: 2025-08-27
Payer: MEDICARE

## (undated) DEVICE — APPL CHLORAPREP HI/LITE 26ML ORNG

## (undated) DEVICE — TP NDL SCORPION MULTIFIRE

## (undated) DEVICE — PK ARTHSCP SHLDR TOWER 40

## (undated) DEVICE — DRAPE,U/ SHT,SPLIT,PLAS,STERIL: Brand: MEDLINE

## (undated) DEVICE — GLV SURG SIGNATURE ESSENTIAL PF LTX SZ8.5

## (undated) DEVICE — POSTN ARMSLV LAT/TRACTION DISP

## (undated) DEVICE — GLV SURG BIOGEL LTX PF 8 1/2

## (undated) DEVICE — EMERALD ARTHROSCOPY SHEET: Brand: CONVERTORS

## (undated) DEVICE — CANN TRPL DAM 7X7MM NO VLV

## (undated) DEVICE — TUBING SET, GRAVITY, 4-SPIKE

## (undated) DEVICE — SUT ETHLN 3/0 PS1 18IN 1663H

## (undated) DEVICE — GLV SURG BIOGEL LTX PF 6 1/2

## (undated) DEVICE — BLD SHAVER BONECUTTER 4MM 13CM

## (undated) DEVICE — GLV SURG SIGNATURE ESSENTIAL PF LTX SZ7

## (undated) DEVICE — GLV SURG BIOGEL LTX PF 7

## (undated) DEVICE — SOL ISO/ALC 70PCT 4OZ

## (undated) DEVICE — GOWN,NON-REINFORCED,SIRUS,SET IN SLV,XXL: Brand: MEDLINE

## (undated) DEVICE — SKIN PREP TRAY W/CHG: Brand: MEDLINE INDUSTRIES, INC.

## (undated) DEVICE — ABL ASP APOLLO RF XL 90D

## (undated) DEVICE — PREP SOL POVIDONE/IODINE BT 4OZ

## (undated) DEVICE — BUR SHAVER CLEARCUT 12FLUT 5MM 13CM

## (undated) DEVICE — DRSNG WND GZ CURAD OIL EMULSION 3X3IN STRL